# Patient Record
Sex: FEMALE | Race: WHITE | URBAN - METROPOLITAN AREA
[De-identification: names, ages, dates, MRNs, and addresses within clinical notes are randomized per-mention and may not be internally consistent; named-entity substitution may affect disease eponyms.]

---

## 2017-01-01 ENCOUNTER — APPOINTMENT (OUTPATIENT)
Dept: OCCUPATIONAL THERAPY | Facility: CLINIC | Age: 82
DRG: 064 | End: 2017-01-01
Attending: INTERNAL MEDICINE
Payer: MEDICARE

## 2017-01-01 ENCOUNTER — MEDICAL CORRESPONDENCE (OUTPATIENT)
Dept: HEALTH INFORMATION MANAGEMENT | Facility: CLINIC | Age: 82
End: 2017-01-01

## 2017-01-01 ENCOUNTER — TELEPHONE (OUTPATIENT)
Dept: FAMILY MEDICINE | Facility: CLINIC | Age: 82
End: 2017-01-01

## 2017-01-01 ENCOUNTER — APPOINTMENT (OUTPATIENT)
Dept: GENERAL RADIOLOGY | Facility: CLINIC | Age: 82
DRG: 064 | End: 2017-01-01
Attending: EMERGENCY MEDICINE
Payer: MEDICARE

## 2017-01-01 ENCOUNTER — APPOINTMENT (OUTPATIENT)
Dept: CT IMAGING | Facility: CLINIC | Age: 82
DRG: 065 | End: 2017-01-01
Attending: FAMILY MEDICINE
Payer: MEDICARE

## 2017-01-01 ENCOUNTER — DOCUMENTATION ONLY (OUTPATIENT)
Dept: OTHER | Facility: CLINIC | Age: 82
End: 2017-01-01

## 2017-01-01 ENCOUNTER — APPOINTMENT (OUTPATIENT)
Dept: MRI IMAGING | Facility: CLINIC | Age: 82
DRG: 064 | End: 2017-01-01
Attending: NURSE PRACTITIONER
Payer: MEDICARE

## 2017-01-01 ENCOUNTER — APPOINTMENT (OUTPATIENT)
Dept: PHYSICAL THERAPY | Facility: CLINIC | Age: 82
DRG: 064 | End: 2017-01-01
Attending: INTERNAL MEDICINE
Payer: MEDICARE

## 2017-01-01 ENCOUNTER — NURSING HOME VISIT (OUTPATIENT)
Dept: GERIATRICS | Facility: CLINIC | Age: 82
End: 2017-01-01
Payer: COMMERCIAL

## 2017-01-01 ENCOUNTER — APPOINTMENT (OUTPATIENT)
Dept: ULTRASOUND IMAGING | Facility: CLINIC | Age: 82
DRG: 064 | End: 2017-01-01
Attending: PSYCHIATRY & NEUROLOGY
Payer: MEDICARE

## 2017-01-01 ENCOUNTER — OFFICE VISIT (OUTPATIENT)
Dept: FAMILY MEDICINE | Facility: CLINIC | Age: 82
End: 2017-01-01
Payer: COMMERCIAL

## 2017-01-01 ENCOUNTER — RECORDS - HEALTHEAST (OUTPATIENT)
Dept: LAB | Facility: CLINIC | Age: 82
End: 2017-01-01

## 2017-01-01 ENCOUNTER — APPOINTMENT (OUTPATIENT)
Dept: SPEECH THERAPY | Facility: CLINIC | Age: 82
DRG: 064 | End: 2017-01-01
Attending: INTERNAL MEDICINE
Payer: MEDICARE

## 2017-01-01 ENCOUNTER — HOSPITAL ENCOUNTER (INPATIENT)
Facility: CLINIC | Age: 82
LOS: 4 days | Discharge: NURSING, REHAB OR LONG-TERM CARE | DRG: 064 | End: 2017-01-28
Attending: EMERGENCY MEDICINE | Admitting: INTERNAL MEDICINE
Payer: MEDICARE

## 2017-01-01 ENCOUNTER — APPOINTMENT (OUTPATIENT)
Dept: CARDIOLOGY | Facility: CLINIC | Age: 82
DRG: 064 | End: 2017-01-01
Attending: INTERNAL MEDICINE
Payer: MEDICARE

## 2017-01-01 ENCOUNTER — TRANSFERRED RECORDS (OUTPATIENT)
Dept: HEALTH INFORMATION MANAGEMENT | Facility: CLINIC | Age: 82
End: 2017-01-01

## 2017-01-01 ENCOUNTER — HOSPITAL ENCOUNTER (INPATIENT)
Facility: CLINIC | Age: 82
LOS: 5 days | Discharge: NURSING, REHAB OR LONG-TERM CARE | DRG: 065 | End: 2017-07-18
Attending: EMERGENCY MEDICINE | Admitting: INTERNAL MEDICINE
Payer: MEDICARE

## 2017-01-01 ENCOUNTER — DISCHARGE SUMMARY NURSING HOME (OUTPATIENT)
Dept: GERIATRICS | Facility: CLINIC | Age: 82
End: 2017-01-01
Payer: COMMERCIAL

## 2017-01-01 ENCOUNTER — TELEPHONE (OUTPATIENT)
Dept: GERIATRICS | Facility: CLINIC | Age: 82
End: 2017-01-01

## 2017-01-01 ENCOUNTER — APPOINTMENT (OUTPATIENT)
Dept: MRI IMAGING | Facility: CLINIC | Age: 82
DRG: 064 | End: 2017-01-01
Attending: PSYCHIATRY & NEUROLOGY
Payer: MEDICARE

## 2017-01-01 ENCOUNTER — APPOINTMENT (OUTPATIENT)
Dept: CT IMAGING | Facility: CLINIC | Age: 82
DRG: 064 | End: 2017-01-01
Attending: EMERGENCY MEDICINE
Payer: MEDICARE

## 2017-01-01 VITALS
DIASTOLIC BLOOD PRESSURE: 71 MMHG | HEART RATE: 67 BPM | SYSTOLIC BLOOD PRESSURE: 143 MMHG | RESPIRATION RATE: 20 BRPM | OXYGEN SATURATION: 97 % | TEMPERATURE: 97.9 F

## 2017-01-01 VITALS
RESPIRATION RATE: 20 BRPM | WEIGHT: 125 LBS | OXYGEN SATURATION: 94 % | DIASTOLIC BLOOD PRESSURE: 67 MMHG | TEMPERATURE: 98.4 F | BODY MASS INDEX: 22.86 KG/M2 | SYSTOLIC BLOOD PRESSURE: 154 MMHG | HEART RATE: 62 BPM

## 2017-01-01 VITALS
OXYGEN SATURATION: 96 % | SYSTOLIC BLOOD PRESSURE: 155 MMHG | HEART RATE: 80 BPM | RESPIRATION RATE: 19 BRPM | DIASTOLIC BLOOD PRESSURE: 61 MMHG | TEMPERATURE: 97.1 F

## 2017-01-01 VITALS
OXYGEN SATURATION: 56 % | BODY MASS INDEX: 23.33 KG/M2 | TEMPERATURE: 98.1 F | DIASTOLIC BLOOD PRESSURE: 67 MMHG | HEART RATE: 56 BPM | WEIGHT: 127.6 LBS | SYSTOLIC BLOOD PRESSURE: 126 MMHG | RESPIRATION RATE: 19 BRPM

## 2017-01-01 VITALS
WEIGHT: 129.8 LBS | SYSTOLIC BLOOD PRESSURE: 121 MMHG | DIASTOLIC BLOOD PRESSURE: 54 MMHG | TEMPERATURE: 97.8 F | OXYGEN SATURATION: 97 % | HEART RATE: 69 BPM | RESPIRATION RATE: 16 BRPM | BODY MASS INDEX: 23.74 KG/M2

## 2017-01-01 VITALS
DIASTOLIC BLOOD PRESSURE: 64 MMHG | WEIGHT: 128 LBS | RESPIRATION RATE: 20 BRPM | SYSTOLIC BLOOD PRESSURE: 126 MMHG | BODY MASS INDEX: 23.55 KG/M2 | HEART RATE: 88 BPM | HEIGHT: 62 IN | TEMPERATURE: 99.2 F | OXYGEN SATURATION: 97 %

## 2017-01-01 VITALS
RESPIRATION RATE: 16 BRPM | DIASTOLIC BLOOD PRESSURE: 77 MMHG | TEMPERATURE: 99 F | SYSTOLIC BLOOD PRESSURE: 136 MMHG | OXYGEN SATURATION: 95 % | HEART RATE: 90 BPM

## 2017-01-01 DIAGNOSIS — I10 ESSENTIAL HYPERTENSION WITH GOAL BLOOD PRESSURE LESS THAN 140/90: ICD-10-CM

## 2017-01-01 DIAGNOSIS — R41.89 COGNITIVE IMPAIRMENT: ICD-10-CM

## 2017-01-01 DIAGNOSIS — E03.9 ACQUIRED HYPOTHYROIDISM: ICD-10-CM

## 2017-01-01 DIAGNOSIS — Z71.89 ACP (ADVANCE CARE PLANNING): Chronic | ICD-10-CM

## 2017-01-01 DIAGNOSIS — I42.9 CARDIOMYOPATHY, UNSPECIFIED (H): ICD-10-CM

## 2017-01-01 DIAGNOSIS — I21.4 NSTEMI (NON-ST ELEVATED MYOCARDIAL INFARCTION) (H): ICD-10-CM

## 2017-01-01 DIAGNOSIS — I42.9 CARDIOMYOPATHY, UNSPECIFIED (H): Primary | ICD-10-CM

## 2017-01-01 DIAGNOSIS — I63.9 CEREBROVASCULAR ACCIDENT (CVA), UNSPECIFIED MECHANISM (H): Primary | ICD-10-CM

## 2017-01-01 DIAGNOSIS — M15.0 PRIMARY OSTEOARTHRITIS INVOLVING MULTIPLE JOINTS: ICD-10-CM

## 2017-01-01 DIAGNOSIS — I63.9 CEREBROVASCULAR ACCIDENT (CVA), UNSPECIFIED MECHANISM (H): ICD-10-CM

## 2017-01-01 DIAGNOSIS — R44.3 HALLUCINATIONS: ICD-10-CM

## 2017-01-01 DIAGNOSIS — E03.9 ACQUIRED HYPOTHYROIDISM: Primary | ICD-10-CM

## 2017-01-01 DIAGNOSIS — R53.81 PHYSICAL DECONDITIONING: ICD-10-CM

## 2017-01-01 DIAGNOSIS — R05.9 COUGH: ICD-10-CM

## 2017-01-01 DIAGNOSIS — Z53.9 DIAGNOSIS NOT YET DEFINED: Primary | ICD-10-CM

## 2017-01-01 DIAGNOSIS — Z23 NEED FOR PROPHYLACTIC VACCINATION AGAINST STREPTOCOCCUS PNEUMONIAE (PNEUMOCOCCUS): ICD-10-CM

## 2017-01-01 DIAGNOSIS — R41.0 DELIRIUM: Primary | ICD-10-CM

## 2017-01-01 DIAGNOSIS — I63.9 ACUTE EMBOLIC STROKE (H): ICD-10-CM

## 2017-01-01 DIAGNOSIS — R41.3 MEMORY LOSS: ICD-10-CM

## 2017-01-01 DIAGNOSIS — R41.0 DELIRIUM: ICD-10-CM

## 2017-01-01 DIAGNOSIS — N39.0 URINARY TRACT INFECTION WITHOUT HEMATURIA, SITE UNSPECIFIED: ICD-10-CM

## 2017-01-01 DIAGNOSIS — Z51.5 HOSPICE CARE PATIENT: Primary | ICD-10-CM

## 2017-01-01 DIAGNOSIS — K59.01 SLOW TRANSIT CONSTIPATION: ICD-10-CM

## 2017-01-01 LAB
ALBUMIN SERPL-MCNC: 3.5 G/DL (ref 3.4–5)
ALBUMIN UR-MCNC: NEGATIVE MG/DL
ALP SERPL-CCNC: 93 U/L (ref 40–150)
ALT SERPL W P-5'-P-CCNC: 13 U/L (ref 0–45)
ALT SERPL W P-5'-P-CCNC: 15 U/L (ref 0–50)
ANION GAP SERPL CALCULATED.3IONS-SCNC: 10 MMOL/L (ref 5–18)
ANION GAP SERPL CALCULATED.3IONS-SCNC: 7 MMOL/L (ref 3–14)
ANION GAP SERPL CALCULATED.3IONS-SCNC: 7 MMOL/L (ref 3–14)
ANION GAP SERPL CALCULATED.3IONS-SCNC: 7 MMOL/L (ref 5–18)
APPEARANCE UR: ABNORMAL
APPEARANCE UR: CLEAR
APPEARANCE UR: CLEAR
APTT PPP: 27 SEC (ref 22–37)
AST SERPL W P-5'-P-CCNC: 18 U/L (ref 0–45)
BACTERIA #/AREA URNS HPF: ABNORMAL /HPF
BASOPHILS # BLD AUTO: 0 10E9/L (ref 0–0.2)
BASOPHILS # BLD AUTO: 0.1 10E9/L (ref 0–0.2)
BASOPHILS NFR BLD AUTO: 0.5 %
BASOPHILS NFR BLD AUTO: 0.7 %
BILIRUB SERPL-MCNC: 0.4 MG/DL (ref 0.2–1.3)
BILIRUB UR QL STRIP: NEGATIVE
BUN SERPL-MCNC: 18 MG/DL (ref 8–28)
BUN SERPL-MCNC: 20 MG/DL (ref 7–30)
BUN SERPL-MCNC: 22 MG/DL (ref 7–30)
BUN SERPL-MCNC: 23 MG/DL (ref 8–28)
CALCIUM SERPL-MCNC: 8.6 MG/DL (ref 8.5–10.1)
CALCIUM SERPL-MCNC: 8.9 MG/DL (ref 8.5–10.1)
CALCIUM SERPL-MCNC: 8.9 MG/DL (ref 8.5–10.5)
CALCIUM SERPL-MCNC: 9.3 MG/DL (ref 8.5–10.5)
CASTS #/AREA URNS LPF: ABNORMAL /LPF (ref 0–2)
CHLORIDE SERPL-SCNC: 107 MMOL/L (ref 94–109)
CHLORIDE SERPL-SCNC: 110 MMOL/L (ref 94–109)
CHLORIDE SERPLBLD-SCNC: 104 MMOL/L (ref 98–107)
CHLORIDE SERPLBLD-SCNC: 110 MMOL/L (ref 98–107)
CHOLEST SERPL-MCNC: 119 MG/DL
CHOLEST SERPL-MCNC: 174 MG/DL
CO2 SERPL-SCNC: 23 MMOL/L (ref 20–32)
CO2 SERPL-SCNC: 23 MMOL/L (ref 22–31)
CO2 SERPL-SCNC: 24 MMOL/L (ref 22–31)
CO2 SERPL-SCNC: 26 MMOL/L (ref 20–32)
COLOR UR AUTO: ABNORMAL
COLOR UR AUTO: YELLOW
COLOR UR AUTO: YELLOW
CREAT SERPL-MCNC: 0.73 MG/DL (ref 0.52–1.04)
CREAT SERPL-MCNC: 0.73 MG/DL (ref 0.6–1.1)
CREAT SERPL-MCNC: 0.74 MG/DL (ref 0.6–1.1)
CREAT SERPL-MCNC: 0.79 MG/DL (ref 0.52–1.04)
DIFFERENTIAL METHOD BLD: ABNORMAL
DIFFERENTIAL METHOD BLD: ABNORMAL
EOSINOPHIL # BLD AUTO: 0.1 10E9/L (ref 0–0.7)
EOSINOPHIL # BLD AUTO: 0.4 10E9/L (ref 0–0.7)
EOSINOPHIL NFR BLD AUTO: 1.1 %
EOSINOPHIL NFR BLD AUTO: 6.1 %
ERYTHROCYTE [DISTWIDTH] IN BLOOD BY AUTOMATED COUNT: 12.9 % (ref 10–15)
ERYTHROCYTE [DISTWIDTH] IN BLOOD BY AUTOMATED COUNT: 13.9 % (ref 10–15)
FASTING STATUS PATIENT QL REPORTED: YES
GFR SERPL CREATININE-BSD FRML MDRD: 68 ML/MIN/1.7M2
GFR SERPL CREATININE-BSD FRML MDRD: 75 ML/MIN/1.7M2
GFR SERPL CREATININE-BSD FRML MDRD: >60 ML/MIN/1.73M2
GFR SERPL CREATININE-BSD FRML MDRD: >60 ML/MIN/1.73M2
GLUCOSE SERPL-MCNC: 105 MG/DL (ref 70–125)
GLUCOSE SERPL-MCNC: 119 MG/DL (ref 70–99)
GLUCOSE SERPL-MCNC: 80 MG/DL (ref 70–125)
GLUCOSE SERPL-MCNC: 95 MG/DL (ref 70–99)
GLUCOSE UR STRIP-MCNC: NEGATIVE MG/DL
HCT VFR BLD AUTO: 32.1 % (ref 35–47)
HCT VFR BLD AUTO: 34.9 % (ref 35–47)
HDLC SERPL-MCNC: 30 MG/DL
HDLC SERPL-MCNC: 44 MG/DL
HGB BLD-MCNC: 11.2 G/DL (ref 11.7–15.7)
HGB BLD-MCNC: 12 G/DL (ref 11.7–15.7)
HGB UR QL STRIP: ABNORMAL
HGB UR QL STRIP: NEGATIVE
HGB UR QL STRIP: NEGATIVE
IMM GRANULOCYTES # BLD: 0 10E9/L (ref 0–0.4)
IMM GRANULOCYTES # BLD: 0 10E9/L (ref 0–0.4)
IMM GRANULOCYTES NFR BLD: 0 %
IMM GRANULOCYTES NFR BLD: 0.1 %
INR PPP: 1 (ref 0.86–1.14)
INTERPRETATION ECG - MUSE: NORMAL
KETONES UR STRIP-MCNC: NEGATIVE MG/DL
LDLC SERPL CALC-MCNC: 115 MG/DL
LDLC SERPL CALC-MCNC: 73 MG/DL
LEUKOCYTE ESTERASE UR QL STRIP: ABNORMAL
LYMPHOCYTES # BLD AUTO: 1.6 10E9/L (ref 0.8–5.3)
LYMPHOCYTES # BLD AUTO: 2.4 10E9/L (ref 0.8–5.3)
LYMPHOCYTES NFR BLD AUTO: 19.6 %
LYMPHOCYTES NFR BLD AUTO: 35.5 %
MCH RBC QN AUTO: 29.9 PG (ref 26.5–33)
MCH RBC QN AUTO: 31.9 PG (ref 26.5–33)
MCHC RBC AUTO-ENTMCNC: 34.4 G/DL (ref 31.5–36.5)
MCHC RBC AUTO-ENTMCNC: 34.9 G/DL (ref 31.5–36.5)
MCV RBC AUTO: 87 FL (ref 78–100)
MCV RBC AUTO: 92 FL (ref 78–100)
MONOCYTES # BLD AUTO: 0.6 10E9/L (ref 0–1.3)
MONOCYTES # BLD AUTO: 0.6 10E9/L (ref 0–1.3)
MONOCYTES NFR BLD AUTO: 7.7 %
MONOCYTES NFR BLD AUTO: 8.8 %
MUCOUS THREADS #/AREA URNS LPF: PRESENT /LPF
NEUTROPHILS # BLD AUTO: 3.3 10E9/L (ref 1.6–8.3)
NEUTROPHILS # BLD AUTO: 5.6 10E9/L (ref 1.6–8.3)
NEUTROPHILS NFR BLD AUTO: 48.8 %
NEUTROPHILS NFR BLD AUTO: 71.1 %
NITRATE UR QL: NEGATIVE
NON-SQ EPI CELLS #/AREA URNS LPF: ABNORMAL /LPF
NON-SQ EPI CELLS #/AREA URNS LPF: ABNORMAL /LPF
NONHDLC SERPL-MCNC: 130 MG/DL
NRBC # BLD AUTO: 0 10*3/UL
NRBC # BLD AUTO: 0 10*3/UL
NRBC BLD AUTO-RTO: 0 /100
NRBC BLD AUTO-RTO: 0 /100
PH UR STRIP: 5.5 PH (ref 5–7)
PH UR STRIP: 6.5 PH (ref 5–7)
PH UR STRIP: 7 PH (ref 5–7)
PLATELET # BLD AUTO: 213 10E9/L (ref 150–450)
PLATELET # BLD AUTO: 310 10E9/L (ref 150–450)
POTASSIUM SERPL-SCNC: 4.1 MMOL/L (ref 3.4–5.3)
POTASSIUM SERPL-SCNC: 4.2 MMOL/L (ref 3.4–5.3)
POTASSIUM SERPL-SCNC: 4.3 MMOL/L (ref 3.5–5)
POTASSIUM SERPL-SCNC: 5.1 MMOL/L (ref 3.5–5)
PROT SERPL-MCNC: 6.6 G/DL (ref 6.8–8.8)
RBC # BLD AUTO: 3.51 10E12/L (ref 3.8–5.2)
RBC # BLD AUTO: 4.01 10E12/L (ref 3.8–5.2)
RBC #/AREA URNS AUTO: 3 /HPF (ref 0–2)
RBC #/AREA URNS AUTO: ABNORMAL /HPF (ref 0–2)
RBC #/AREA URNS AUTO: ABNORMAL /HPF (ref 0–2)
SODIUM SERPL-SCNC: 137 MMOL/L (ref 136–145)
SODIUM SERPL-SCNC: 140 MMOL/L (ref 133–144)
SODIUM SERPL-SCNC: 140 MMOL/L (ref 133–144)
SODIUM SERPL-SCNC: 141 MMOL/L (ref 136–145)
SP GR UR STRIP: 1.01 (ref 1–1.03)
SQUAMOUS #/AREA URNS AUTO: 4 /HPF (ref 0–1)
T4 FREE SERPL-MCNC: 1.52 NG/DL (ref 0.76–1.46)
T4 FREE SERPL-MCNC: 1.72 NG/DL (ref 0.76–1.46)
TRANS CELLS #/AREA URNS HPF: 1 /HPF (ref 0–1)
TRIGL SERPL-MCNC: 75 MG/DL
TRIGL SERPL-MCNC: 81 MG/DL
TROPONIN I SERPL-MCNC: 0.19 UG/L (ref 0–0.04)
TROPONIN I SERPL-MCNC: 0.27 UG/L (ref 0–0.04)
TROPONIN I SERPL-MCNC: 0.31 UG/L (ref 0–0.04)
TROPONIN I SERPL-MCNC: 0.34 UG/L (ref 0–0.04)
TSH SERPL DL<=0.005 MIU/L-ACNC: <0.01 MU/L (ref 0.4–4)
TSH SERPL DL<=0.05 MIU/L-ACNC: <0.01 MU/L (ref 0.4–4)
URN SPEC COLLECT METH UR: ABNORMAL
UROBILINOGEN UR STRIP-ACNC: 0.2 EU/DL (ref 0.2–1)
UROBILINOGEN UR STRIP-ACNC: 0.2 EU/DL (ref 0.2–1)
UROBILINOGEN UR STRIP-MCNC: NORMAL MG/DL (ref 0–2)
WBC # BLD AUTO: 6.9 10E9/L (ref 4–11)
WBC # BLD AUTO: 7.9 10E9/L (ref 4–11)
WBC #/AREA URNS AUTO: >182 /HPF (ref 0–2)
WBC #/AREA URNS AUTO: ABNORMAL /HPF (ref 0–2)
WBC #/AREA URNS AUTO: ABNORMAL /HPF (ref 0–2)

## 2017-01-01 PROCEDURE — 93880 EXTRACRANIAL BILAT STUDY: CPT

## 2017-01-01 PROCEDURE — 40000915 ZZH STATISTIC SITTER, EVENING HOURS

## 2017-01-01 PROCEDURE — 25000128 H RX IP 250 OP 636: Performed by: INTERNAL MEDICINE

## 2017-01-01 PROCEDURE — 85025 COMPLETE CBC W/AUTO DIFF WBC: CPT

## 2017-01-01 PROCEDURE — 84443 ASSAY THYROID STIM HORMONE: CPT | Performed by: INTERNAL MEDICINE

## 2017-01-01 PROCEDURE — 99222 1ST HOSP IP/OBS MODERATE 55: CPT | Performed by: NURSE PRACTITIONER

## 2017-01-01 PROCEDURE — A9270 NON-COVERED ITEM OR SERVICE: HCPCS | Mod: GY | Performed by: INTERNAL MEDICINE

## 2017-01-01 PROCEDURE — 99207 ZZC CDG-CORRECTLY CODED, REVIEWED AND AGREE: CPT | Performed by: NURSE PRACTITIONER

## 2017-01-01 PROCEDURE — 36415 COLL VENOUS BLD VENIPUNCTURE: CPT | Performed by: INTERNAL MEDICINE

## 2017-01-01 PROCEDURE — 25000125 ZZHC RX 250: Performed by: INTERNAL MEDICINE

## 2017-01-01 PROCEDURE — 12000000 ZZH R&B MED SURG/OB

## 2017-01-01 PROCEDURE — 81001 URINALYSIS AUTO W/SCOPE: CPT | Performed by: EMERGENCY MEDICINE

## 2017-01-01 PROCEDURE — 40000916 ZZH STATISTIC SITTER, NIGHT HOURS

## 2017-01-01 PROCEDURE — 25500064 ZZH RX 255 OP 636: Performed by: INTERNAL MEDICINE

## 2017-01-01 PROCEDURE — 93005 ELECTROCARDIOGRAM TRACING: CPT

## 2017-01-01 PROCEDURE — S0166 INJ OLANZAPINE 2.5MG: HCPCS | Performed by: INTERNAL MEDICINE

## 2017-01-01 PROCEDURE — 81001 URINALYSIS AUTO W/SCOPE: CPT | Performed by: INTERNAL MEDICINE

## 2017-01-01 PROCEDURE — 70450 CT HEAD/BRAIN W/O DYE: CPT

## 2017-01-01 PROCEDURE — 25000132 ZZH RX MED GY IP 250 OP 250 PS 637: Mod: GY | Performed by: INTERNAL MEDICINE

## 2017-01-01 PROCEDURE — 99239 HOSP IP/OBS DSCHRG MGMT >30: CPT | Performed by: INTERNAL MEDICINE

## 2017-01-01 PROCEDURE — 97535 SELF CARE MNGMENT TRAINING: CPT | Mod: GO

## 2017-01-01 PROCEDURE — 99232 SBSQ HOSP IP/OBS MODERATE 35: CPT | Performed by: INTERNAL MEDICINE

## 2017-01-01 PROCEDURE — 97530 THERAPEUTIC ACTIVITIES: CPT | Mod: GO

## 2017-01-01 PROCEDURE — 80061 LIPID PANEL: CPT | Performed by: INTERNAL MEDICINE

## 2017-01-01 PROCEDURE — 96361 HYDRATE IV INFUSION ADD-ON: CPT

## 2017-01-01 PROCEDURE — 99316 NF DSCHRG MGMT 30 MIN+: CPT | Performed by: NURSE PRACTITIONER

## 2017-01-01 PROCEDURE — 99223 1ST HOSP IP/OBS HIGH 75: CPT | Mod: AI | Performed by: INTERNAL MEDICINE

## 2017-01-01 PROCEDURE — 84443 ASSAY THYROID STIM HORMONE: CPT | Performed by: EMERGENCY MEDICINE

## 2017-01-01 PROCEDURE — 99285 EMERGENCY DEPT VISIT HI MDM: CPT | Mod: 25

## 2017-01-01 PROCEDURE — 97161 PT EVAL LOW COMPLEX 20 MIN: CPT | Mod: GP

## 2017-01-01 PROCEDURE — 99231 SBSQ HOSP IP/OBS SF/LOW 25: CPT | Performed by: INTERNAL MEDICINE

## 2017-01-01 PROCEDURE — A9585 GADOBUTROL INJECTION: HCPCS | Performed by: INTERNAL MEDICINE

## 2017-01-01 PROCEDURE — 25000125 ZZHC RX 250: Performed by: FAMILY MEDICINE

## 2017-01-01 PROCEDURE — 85610 PROTHROMBIN TIME: CPT

## 2017-01-01 PROCEDURE — 70553 MRI BRAIN STEM W/O & W/DYE: CPT

## 2017-01-01 PROCEDURE — 25000132 ZZH RX MED GY IP 250 OP 250 PS 637: Mod: GY | Performed by: EMERGENCY MEDICINE

## 2017-01-01 PROCEDURE — 99233 SBSQ HOSP IP/OBS HIGH 50: CPT | Performed by: INTERNAL MEDICINE

## 2017-01-01 PROCEDURE — 85025 COMPLETE CBC W/AUTO DIFF WBC: CPT | Performed by: EMERGENCY MEDICINE

## 2017-01-01 PROCEDURE — 84439 ASSAY OF FREE THYROXINE: CPT | Performed by: EMERGENCY MEDICINE

## 2017-01-01 PROCEDURE — 92526 ORAL FUNCTION THERAPY: CPT | Mod: GN

## 2017-01-01 PROCEDURE — 25000128 H RX IP 250 OP 636: Performed by: EMERGENCY MEDICINE

## 2017-01-01 PROCEDURE — 99309 SBSQ NF CARE MODERATE MDM 30: CPT | Performed by: NURSE PRACTITIONER

## 2017-01-01 PROCEDURE — 99306 1ST NF CARE HIGH MDM 50: CPT | Performed by: FAMILY MEDICINE

## 2017-01-01 PROCEDURE — 80053 COMPREHEN METABOLIC PANEL: CPT | Performed by: EMERGENCY MEDICINE

## 2017-01-01 PROCEDURE — 80048 BASIC METABOLIC PNL TOTAL CA: CPT

## 2017-01-01 PROCEDURE — 70498 CT ANGIOGRAPHY NECK: CPT

## 2017-01-01 PROCEDURE — 99223 1ST HOSP IP/OBS HIGH 75: CPT | Performed by: FAMILY MEDICINE

## 2017-01-01 PROCEDURE — 99207 ZZC CDG-CORRECTLY CODED, REVIEWED AND AGREE: CPT | Performed by: FAMILY MEDICINE

## 2017-01-01 PROCEDURE — 99214 OFFICE O/P EST MOD 30 MIN: CPT | Mod: 25 | Performed by: INTERNAL MEDICINE

## 2017-01-01 PROCEDURE — 25000128 H RX IP 250 OP 636: Performed by: PSYCHIATRY & NEUROLOGY

## 2017-01-01 PROCEDURE — A9270 NON-COVERED ITEM OR SERVICE: HCPCS | Mod: GY | Performed by: EMERGENCY MEDICINE

## 2017-01-01 PROCEDURE — 96360 HYDRATION IV INFUSION INIT: CPT

## 2017-01-01 PROCEDURE — 40000264 ECHO COMPLETE WITH OPTISON

## 2017-01-01 PROCEDURE — 40000225 ZZH STATISTIC SLP WARD VISIT

## 2017-01-01 PROCEDURE — 84484 ASSAY OF TROPONIN QUANT: CPT | Performed by: EMERGENCY MEDICINE

## 2017-01-01 PROCEDURE — 97165 OT EVAL LOW COMPLEX 30 MIN: CPT | Mod: GO

## 2017-01-01 PROCEDURE — 70551 MRI BRAIN STEM W/O DYE: CPT

## 2017-01-01 PROCEDURE — 99310 SBSQ NF CARE HIGH MDM 45: CPT | Performed by: NURSE PRACTITIONER

## 2017-01-01 PROCEDURE — 93306 TTE W/DOPPLER COMPLETE: CPT | Mod: 26 | Performed by: INTERNAL MEDICINE

## 2017-01-01 PROCEDURE — 99207 ZZC CDG-MDM COMPONENT: MEETS MODERATE - UP CODED: CPT | Performed by: INTERNAL MEDICINE

## 2017-01-01 PROCEDURE — 92610 EVALUATE SWALLOWING FUNCTION: CPT | Mod: GN

## 2017-01-01 PROCEDURE — 84484 ASSAY OF TROPONIN QUANT: CPT | Performed by: INTERNAL MEDICINE

## 2017-01-01 PROCEDURE — G0378 HOSPITAL OBSERVATION PER HR: HCPCS

## 2017-01-01 PROCEDURE — 96374 THER/PROPH/DIAG INJ IV PUSH: CPT

## 2017-01-01 PROCEDURE — 70460 CT HEAD/BRAIN W/DYE: CPT

## 2017-01-01 PROCEDURE — 84439 ASSAY OF FREE THYROXINE: CPT | Performed by: INTERNAL MEDICINE

## 2017-01-01 PROCEDURE — 71020 XR CHEST 2 VW: CPT

## 2017-01-01 PROCEDURE — 25000128 H RX IP 250 OP 636: Performed by: FAMILY MEDICINE

## 2017-01-01 PROCEDURE — G0180 MD CERTIFICATION HHA PATIENT: HCPCS | Performed by: FAMILY MEDICINE

## 2017-01-01 PROCEDURE — 90670 PCV13 VACCINE IM: CPT | Performed by: INTERNAL MEDICINE

## 2017-01-01 PROCEDURE — 85730 THROMBOPLASTIN TIME PARTIAL: CPT

## 2017-01-01 PROCEDURE — 99220 ZZC INITIAL OBSERVATION CARE,LEVL III: CPT | Performed by: INTERNAL MEDICINE

## 2017-01-01 PROCEDURE — 40000133 ZZH STATISTIC OT WARD VISIT

## 2017-01-01 PROCEDURE — G0009 ADMIN PNEUMOCOCCAL VACCINE: HCPCS | Performed by: INTERNAL MEDICINE

## 2017-01-01 PROCEDURE — 99207 ZZC CDG-MDM COMPONENT: MEETS LOW - DOWN CODED: CPT | Performed by: INTERNAL MEDICINE

## 2017-01-01 PROCEDURE — 40000193 ZZH STATISTIC PT WARD VISIT

## 2017-01-01 PROCEDURE — 70470 CT HEAD/BRAIN W/O & W/DYE: CPT | Mod: XS

## 2017-01-01 PROCEDURE — 97530 THERAPEUTIC ACTIVITIES: CPT | Mod: GP

## 2017-01-01 PROCEDURE — 97116 GAIT TRAINING THERAPY: CPT | Mod: GP

## 2017-01-01 RX ORDER — BISACODYL 10 MG
10 SUPPOSITORY, RECTAL RECTAL DAILY PRN
Status: DISCONTINUED | OUTPATIENT
Start: 2017-01-01 | End: 2017-01-01 | Stop reason: HOSPADM

## 2017-01-01 RX ORDER — ATROPINE SULFATE 10 MG/ML
2 SOLUTION/ DROPS OPHTHALMIC
Qty: 5 ML | Refills: 1 | Status: SHIPPED | OUTPATIENT
Start: 2017-01-01

## 2017-01-01 RX ORDER — POLYETHYLENE GLYCOL 3350 17 G/17G
17 POWDER, FOR SOLUTION ORAL DAILY PRN
Status: DISCONTINUED | OUTPATIENT
Start: 2017-01-01 | End: 2017-01-01

## 2017-01-01 RX ORDER — SODIUM CHLORIDE 9 MG/ML
1000 INJECTION, SOLUTION INTRAVENOUS CONTINUOUS
Status: DISCONTINUED | OUTPATIENT
Start: 2017-01-01 | End: 2017-01-01

## 2017-01-01 RX ORDER — LISINOPRIL 5 MG/1
5 TABLET ORAL DAILY
Qty: 30 TABLET | Status: ON HOLD | DISCHARGE
Start: 2017-01-01 | End: 2017-01-01

## 2017-01-01 RX ORDER — SODIUM CHLORIDE 9 MG/ML
INJECTION, SOLUTION INTRAVENOUS CONTINUOUS
Status: DISCONTINUED | OUTPATIENT
Start: 2017-01-01 | End: 2017-01-01 | Stop reason: HOSPADM

## 2017-01-01 RX ORDER — ONDANSETRON 2 MG/ML
4 INJECTION INTRAMUSCULAR; INTRAVENOUS EVERY 6 HOURS PRN
Status: DISCONTINUED | OUTPATIENT
Start: 2017-01-01 | End: 2017-01-01 | Stop reason: HOSPADM

## 2017-01-01 RX ORDER — ASPIRIN 300 MG/1
300 SUPPOSITORY RECTAL DAILY
Status: DISCONTINUED | OUTPATIENT
Start: 2017-01-01 | End: 2017-01-01

## 2017-01-01 RX ORDER — AMOXICILLIN 250 MG
1 CAPSULE ORAL DAILY PRN
Status: ON HOLD | COMMUNITY
End: 2017-01-01

## 2017-01-01 RX ORDER — MORPHINE SULFATE 2 MG/ML
1-2 INJECTION, SOLUTION INTRAMUSCULAR; INTRAVENOUS
Status: DISCONTINUED | OUTPATIENT
Start: 2017-01-01 | End: 2017-01-01 | Stop reason: HOSPADM

## 2017-01-01 RX ORDER — CARVEDILOL 3.12 MG/1
3.12 TABLET ORAL 2 TIMES DAILY WITH MEALS
Status: DISCONTINUED | OUTPATIENT
Start: 2017-01-01 | End: 2017-01-01 | Stop reason: HOSPADM

## 2017-01-01 RX ORDER — CIPROFLOXACIN 2 MG/ML
400 INJECTION, SOLUTION INTRAVENOUS EVERY 12 HOURS
Status: DISCONTINUED | OUTPATIENT
Start: 2017-01-01 | End: 2017-01-01

## 2017-01-01 RX ORDER — OLANZAPINE 5 MG/1
5 TABLET, ORALLY DISINTEGRATING ORAL EVERY 6 HOURS PRN
Status: DISCONTINUED | OUTPATIENT
Start: 2017-01-01 | End: 2017-01-01

## 2017-01-01 RX ORDER — PROCHLORPERAZINE 25 MG
12.5 SUPPOSITORY, RECTAL RECTAL EVERY 12 HOURS PRN
Status: DISCONTINUED | OUTPATIENT
Start: 2017-01-01 | End: 2017-01-01 | Stop reason: HOSPADM

## 2017-01-01 RX ORDER — OLANZAPINE 5 MG/1
5 TABLET, ORALLY DISINTEGRATING ORAL 2 TIMES DAILY
Qty: 30 TABLET | Refills: 0 | Status: SHIPPED | OUTPATIENT
Start: 2017-01-01

## 2017-01-01 RX ORDER — ACETAMINOPHEN 325 MG/1
650 TABLET ORAL EVERY 4 HOURS PRN
Status: DISCONTINUED | OUTPATIENT
Start: 2017-01-01 | End: 2017-01-01 | Stop reason: HOSPADM

## 2017-01-01 RX ORDER — ACETAMINOPHEN 650 MG/1
650 SUPPOSITORY RECTAL EVERY 4 HOURS PRN
Status: DISCONTINUED | OUTPATIENT
Start: 2017-01-01 | End: 2017-01-01 | Stop reason: HOSPADM

## 2017-01-01 RX ORDER — LEVOTHYROXINE SODIUM 50 UG/1
50 TABLET ORAL DAILY
DISCHARGE
Start: 2017-01-01 | End: 2017-01-01

## 2017-01-01 RX ORDER — CARVEDILOL 3.12 MG/1
3.12 TABLET ORAL 2 TIMES DAILY WITH MEALS
Qty: 60 TABLET | Status: ON HOLD | DISCHARGE
Start: 2017-01-01 | End: 2017-01-01

## 2017-01-01 RX ORDER — OSELTAMIVIR PHOSPHATE 30 MG/1
30 CAPSULE ORAL DAILY
COMMUNITY
End: 2017-01-01

## 2017-01-01 RX ORDER — AMOXICILLIN 250 MG
1-2 CAPSULE ORAL 2 TIMES DAILY PRN
Status: DISCONTINUED | OUTPATIENT
Start: 2017-01-01 | End: 2017-01-01 | Stop reason: HOSPADM

## 2017-01-01 RX ORDER — LORAZEPAM 0.5 MG/1
.5-1 TABLET ORAL
Status: DISCONTINUED | OUTPATIENT
Start: 2017-01-01 | End: 2017-01-01 | Stop reason: HOSPADM

## 2017-01-01 RX ORDER — ONDANSETRON 2 MG/ML
4 INJECTION INTRAMUSCULAR; INTRAVENOUS EVERY 6 HOURS PRN
Status: DISCONTINUED | OUTPATIENT
Start: 2017-01-01 | End: 2017-01-01

## 2017-01-01 RX ORDER — AMOXICILLIN 250 MG
1 CAPSULE ORAL DAILY PRN
Status: DISCONTINUED | OUTPATIENT
Start: 2017-01-01 | End: 2017-01-01 | Stop reason: HOSPADM

## 2017-01-01 RX ORDER — CEFTRIAXONE 1 G/1
1 INJECTION, POWDER, FOR SOLUTION INTRAMUSCULAR; INTRAVENOUS EVERY 24 HOURS
Status: DISCONTINUED | OUTPATIENT
Start: 2017-01-01 | End: 2017-01-01

## 2017-01-01 RX ORDER — OLANZAPINE 5 MG/1
5 TABLET, ORALLY DISINTEGRATING ORAL 2 TIMES DAILY PRN
Status: DISCONTINUED | OUTPATIENT
Start: 2017-01-01 | End: 2017-01-01

## 2017-01-01 RX ORDER — BISACODYL 10 MG
SUPPOSITORY, RECTAL RECTAL
Qty: 12 SUPPOSITORY | Refills: 1 | Status: SHIPPED | OUTPATIENT
Start: 2017-01-01

## 2017-01-01 RX ORDER — ATORVASTATIN CALCIUM 10 MG/1
10 TABLET, FILM COATED ORAL DAILY
Status: DISCONTINUED | OUTPATIENT
Start: 2017-01-01 | End: 2017-01-01 | Stop reason: HOSPADM

## 2017-01-01 RX ORDER — ONDANSETRON 4 MG/1
4 TABLET, ORALLY DISINTEGRATING ORAL EVERY 6 HOURS PRN
Status: DISCONTINUED | OUTPATIENT
Start: 2017-01-01 | End: 2017-01-01 | Stop reason: HOSPADM

## 2017-01-01 RX ORDER — LANOLIN ALCOHOL/MO/W.PET/CERES
1000 CREAM (GRAM) TOPICAL DAILY
Status: DISCONTINUED | OUTPATIENT
Start: 2017-01-01 | End: 2017-01-01

## 2017-01-01 RX ORDER — WATER 10 ML/10ML
INJECTION INTRAMUSCULAR; INTRAVENOUS; SUBCUTANEOUS
Status: DISCONTINUED
Start: 2017-01-01 | End: 2017-01-01 | Stop reason: HOSPADM

## 2017-01-01 RX ORDER — IOPAMIDOL 755 MG/ML
120 INJECTION, SOLUTION INTRAVASCULAR ONCE
Status: COMPLETED | OUTPATIENT
Start: 2017-01-01 | End: 2017-01-01

## 2017-01-01 RX ORDER — ASPIRIN 325 MG
325 TABLET ORAL DAILY
Status: DISCONTINUED | OUTPATIENT
Start: 2017-01-01 | End: 2017-01-01

## 2017-01-01 RX ORDER — GADOBUTROL 604.72 MG/ML
6 INJECTION INTRAVENOUS ONCE
Status: COMPLETED | OUTPATIENT
Start: 2017-01-01 | End: 2017-01-01

## 2017-01-01 RX ORDER — LEVOTHYROXINE SODIUM 75 UG/1
75 TABLET ORAL EVERY MORNING
Status: DISCONTINUED | OUTPATIENT
Start: 2017-01-01 | End: 2017-01-01 | Stop reason: HOSPADM

## 2017-01-01 RX ORDER — ASPIRIN 325 MG
325 TABLET ORAL DAILY
Status: DISCONTINUED | OUTPATIENT
Start: 2017-01-01 | End: 2017-01-01 | Stop reason: HOSPADM

## 2017-01-01 RX ORDER — ASPIRIN 325 MG
325 TABLET ORAL ONCE
Status: COMPLETED | OUTPATIENT
Start: 2017-01-01 | End: 2017-01-01

## 2017-01-01 RX ORDER — ASPIRIN 81 MG/1
81 TABLET ORAL DAILY
Status: DISCONTINUED | OUTPATIENT
Start: 2017-01-01 | End: 2017-01-01

## 2017-01-01 RX ORDER — ATORVASTATIN CALCIUM 10 MG/1
10 TABLET, FILM COATED ORAL DAILY
Qty: 30 TABLET | Status: ON HOLD | DISCHARGE
Start: 2017-01-01 | End: 2017-01-01

## 2017-01-01 RX ORDER — NALOXONE HYDROCHLORIDE 0.4 MG/ML
.1-.4 INJECTION, SOLUTION INTRAMUSCULAR; INTRAVENOUS; SUBCUTANEOUS
Status: DISCONTINUED | OUTPATIENT
Start: 2017-01-01 | End: 2017-01-01 | Stop reason: HOSPADM

## 2017-01-01 RX ORDER — HALOPERIDOL 5 MG/ML
1 INJECTION INTRAMUSCULAR ONCE
Status: COMPLETED | OUTPATIENT
Start: 2017-01-01 | End: 2017-01-01

## 2017-01-01 RX ORDER — MORPHINE SULFATE 20 MG/ML
5-10 SOLUTION ORAL
Status: DISCONTINUED | OUTPATIENT
Start: 2017-01-01 | End: 2017-01-01

## 2017-01-01 RX ORDER — LABETALOL HYDROCHLORIDE 5 MG/ML
10 INJECTION, SOLUTION INTRAVENOUS EVERY 4 HOURS PRN
Status: DISCONTINUED | OUTPATIENT
Start: 2017-01-01 | End: 2017-01-01 | Stop reason: HOSPADM

## 2017-01-01 RX ORDER — MORPHINE SULFATE 10 MG/5ML
5-10 SOLUTION ORAL
Status: DISCONTINUED | OUTPATIENT
Start: 2017-01-01 | End: 2017-01-01

## 2017-01-01 RX ORDER — OLANZAPINE 5 MG/1
5 TABLET, ORALLY DISINTEGRATING ORAL EVERY 8 HOURS PRN
Qty: 30 TABLET | Refills: 0 | Status: SHIPPED | OUTPATIENT
Start: 2017-01-01

## 2017-01-01 RX ORDER — GLYCOPYRROLATE 1 MG/1
2 TABLET ORAL EVERY 4 HOURS PRN
Qty: 30 TABLET | Refills: 1 | Status: SHIPPED | OUTPATIENT
Start: 2017-01-01

## 2017-01-01 RX ORDER — LEVOTHYROXINE SODIUM 75 UG/1
75 TABLET ORAL
Status: DISCONTINUED | OUTPATIENT
Start: 2017-01-01 | End: 2017-01-01

## 2017-01-01 RX ORDER — BENZTROPINE MESYLATE 1 MG/1
1-2 TABLET ORAL 3 TIMES DAILY PRN
Status: DISCONTINUED | OUTPATIENT
Start: 2017-01-01 | End: 2017-01-01 | Stop reason: HOSPADM

## 2017-01-01 RX ORDER — SODIUM CHLORIDE 9 MG/ML
INJECTION, SOLUTION INTRAVENOUS CONTINUOUS
Status: DISCONTINUED | OUTPATIENT
Start: 2017-01-01 | End: 2017-01-01

## 2017-01-01 RX ORDER — HALOPERIDOL 5 MG/ML
.5-1 INJECTION INTRAMUSCULAR
Status: DISCONTINUED | OUTPATIENT
Start: 2017-01-01 | End: 2017-01-01 | Stop reason: HOSPADM

## 2017-01-01 RX ORDER — MORPHINE SULFATE 20 MG/ML
2.5 SOLUTION ORAL
Qty: 30 ML | Refills: 0 | Status: SHIPPED | OUTPATIENT
Start: 2017-01-01

## 2017-01-01 RX ORDER — ACETAMINOPHEN 325 MG/1
650 TABLET ORAL EVERY 4 HOURS PRN
COMMUNITY
End: 2017-01-01

## 2017-01-01 RX ORDER — ONDANSETRON 4 MG/1
4 TABLET, ORALLY DISINTEGRATING ORAL EVERY 6 HOURS PRN
Status: DISCONTINUED | OUTPATIENT
Start: 2017-01-01 | End: 2017-01-01

## 2017-01-01 RX ORDER — LORAZEPAM 2 MG/ML
0.5 CONCENTRATE ORAL EVERY 4 HOURS PRN
Qty: 30 ML | Refills: 1 | Status: SHIPPED | OUTPATIENT
Start: 2017-01-01

## 2017-01-01 RX ORDER — ASPIRIN 81 MG/1
81 TABLET ORAL DAILY
Status: DISCONTINUED | OUTPATIENT
Start: 2017-01-01 | End: 2017-01-01 | Stop reason: HOSPADM

## 2017-01-01 RX ORDER — LISINOPRIL 5 MG/1
5 TABLET ORAL DAILY
Status: DISCONTINUED | OUTPATIENT
Start: 2017-01-01 | End: 2017-01-01 | Stop reason: HOSPADM

## 2017-01-01 RX ORDER — IBUPROFEN 100 MG/5ML
400 SUSPENSION, ORAL (FINAL DOSE FORM) ORAL EVERY 6 HOURS PRN
Status: DISCONTINUED | OUTPATIENT
Start: 2017-01-01 | End: 2017-01-01 | Stop reason: HOSPADM

## 2017-01-01 RX ORDER — LABETALOL HYDROCHLORIDE 5 MG/ML
10 INJECTION, SOLUTION INTRAVENOUS
Status: DISCONTINUED | OUTPATIENT
Start: 2017-01-01 | End: 2017-01-01

## 2017-01-01 RX ORDER — OLANZAPINE 10 MG/2ML
5 INJECTION, POWDER, FOR SOLUTION INTRAMUSCULAR DAILY PRN
Status: DISCONTINUED | OUTPATIENT
Start: 2017-01-01 | End: 2017-01-01 | Stop reason: HOSPADM

## 2017-01-01 RX ORDER — CIPROFLOXACIN 250 MG/1
250 TABLET, FILM COATED ORAL EVERY 12 HOURS SCHEDULED
Status: DISCONTINUED | OUTPATIENT
Start: 2017-01-01 | End: 2017-01-01 | Stop reason: HOSPADM

## 2017-01-01 RX ORDER — ATORVASTATIN CALCIUM 20 MG/1
20 TABLET, FILM COATED ORAL DAILY
Status: DISCONTINUED | OUTPATIENT
Start: 2017-01-01 | End: 2017-01-01

## 2017-01-01 RX ORDER — CIPROFLOXACIN 250 MG/1
250 TABLET, FILM COATED ORAL EVERY 12 HOURS SCHEDULED
Status: DISCONTINUED | OUTPATIENT
Start: 2017-01-01 | End: 2017-01-01

## 2017-01-01 RX ORDER — HYDROMORPHONE HYDROCHLORIDE 1 MG/ML
0.2 INJECTION, SOLUTION INTRAMUSCULAR; INTRAVENOUS; SUBCUTANEOUS
Status: DISCONTINUED | OUTPATIENT
Start: 2017-01-01 | End: 2017-01-01 | Stop reason: HOSPADM

## 2017-01-01 RX ORDER — OLANZAPINE 5 MG/1
5 TABLET, ORALLY DISINTEGRATING ORAL 2 TIMES DAILY
Status: DISCONTINUED | OUTPATIENT
Start: 2017-01-01 | End: 2017-01-01 | Stop reason: HOSPADM

## 2017-01-01 RX ORDER — PROCHLORPERAZINE MALEATE 5 MG
5 TABLET ORAL EVERY 6 HOURS PRN
Status: DISCONTINUED | OUTPATIENT
Start: 2017-01-01 | End: 2017-01-01 | Stop reason: HOSPADM

## 2017-01-01 RX ORDER — OLANZAPINE 5 MG/1
5 TABLET, ORALLY DISINTEGRATING ORAL ONCE
Status: DISCONTINUED | OUTPATIENT
Start: 2017-01-01 | End: 2017-01-01 | Stop reason: HOSPADM

## 2017-01-01 RX ORDER — SENNOSIDES 8.6 MG
2 TABLET ORAL DAILY
COMMUNITY
End: 2017-01-01

## 2017-01-01 RX ORDER — LORAZEPAM 2 MG/ML
.5-1 INJECTION INTRAMUSCULAR
Status: DISCONTINUED | OUTPATIENT
Start: 2017-01-01 | End: 2017-01-01 | Stop reason: HOSPADM

## 2017-01-01 RX ORDER — LEVOTHYROXINE SODIUM 75 UG/1
75 TABLET ORAL DAILY
Qty: 90 TABLET | Refills: 3 | Status: ON HOLD | OUTPATIENT
Start: 2017-01-01 | End: 2017-01-01

## 2017-01-01 RX ADMIN — Medication 2.5 MG: at 18:38

## 2017-01-01 RX ADMIN — LEVOTHYROXINE SODIUM 75 MCG: 75 TABLET ORAL at 08:31

## 2017-01-01 RX ADMIN — LEVOTHYROXINE SODIUM 75 MCG: 75 TABLET ORAL at 06:35

## 2017-01-01 RX ADMIN — ATORVASTATIN CALCIUM 10 MG: 10 TABLET, FILM COATED ORAL at 14:30

## 2017-01-01 RX ADMIN — SODIUM CHLORIDE: 9 INJECTION, SOLUTION INTRAVENOUS at 01:29

## 2017-01-01 RX ADMIN — CIPROFLOXACIN HYDROCHLORIDE 250 MG: 250 TABLET, FILM COATED ORAL at 08:53

## 2017-01-01 RX ADMIN — CIPROFLOXACIN HYDROCHLORIDE 250 MG: 250 TABLET, FILM COATED ORAL at 08:31

## 2017-01-01 RX ADMIN — Medication 2.5 MG: at 12:01

## 2017-01-01 RX ADMIN — OLANZAPINE 5 MG: 5 TABLET, ORALLY DISINTEGRATING ORAL at 00:49

## 2017-01-01 RX ADMIN — ASPIRIN 81 MG: 81 TABLET, COATED ORAL at 09:20

## 2017-01-01 RX ADMIN — ASPIRIN 325 MG ORAL TABLET 325 MG: 325 PILL ORAL at 16:19

## 2017-01-01 RX ADMIN — HYDROMORPHONE HYDROCHLORIDE 0.2 MG: 1 INJECTION, SOLUTION INTRAMUSCULAR; INTRAVENOUS; SUBCUTANEOUS at 10:28

## 2017-01-01 RX ADMIN — HYDROMORPHONE HYDROCHLORIDE 0.2 MG: 1 INJECTION, SOLUTION INTRAMUSCULAR; INTRAVENOUS; SUBCUTANEOUS at 09:00

## 2017-01-01 RX ADMIN — SODIUM CHLORIDE: 9 INJECTION, SOLUTION INTRAVENOUS at 07:15

## 2017-01-01 RX ADMIN — SODIUM CHLORIDE: 9 INJECTION, SOLUTION INTRAVENOUS at 21:18

## 2017-01-01 RX ADMIN — LISINOPRIL 5 MG: 5 TABLET ORAL at 09:20

## 2017-01-01 RX ADMIN — OLANZAPINE 5 MG: 10 INJECTION, POWDER, FOR SOLUTION INTRAMUSCULAR at 15:25

## 2017-01-01 RX ADMIN — CEFTRIAXONE 1 G: 1 INJECTION, POWDER, FOR SOLUTION INTRAMUSCULAR; INTRAVENOUS at 13:41

## 2017-01-01 RX ADMIN — LEVOTHYROXINE SODIUM 75 MCG: 75 TABLET ORAL at 10:24

## 2017-01-01 RX ADMIN — CIPROFLOXACIN HYDROCHLORIDE 250 MG: 250 TABLET, FILM COATED ORAL at 20:47

## 2017-01-01 RX ADMIN — CYANOCOBALAMIN TAB 1000 MCG 1000 MCG: 1000 TAB at 08:42

## 2017-01-01 RX ADMIN — CARVEDILOL 3.12 MG: 3.12 TABLET, FILM COATED ORAL at 17:04

## 2017-01-01 RX ADMIN — IOPAMIDOL 120 ML: 755 INJECTION, SOLUTION INTRAVENOUS at 18:29

## 2017-01-01 RX ADMIN — CEFTRIAXONE 1 G: 1 INJECTION, POWDER, FOR SOLUTION INTRAMUSCULAR; INTRAVENOUS at 11:57

## 2017-01-01 RX ADMIN — ASPIRIN 325 MG ORAL TABLET 325 MG: 325 PILL ORAL at 08:31

## 2017-01-01 RX ADMIN — CYANOCOBALAMIN TAB 1000 MCG 1000 MCG: 1000 TAB at 19:48

## 2017-01-01 RX ADMIN — LISINOPRIL 5 MG: 5 TABLET ORAL at 14:30

## 2017-01-01 RX ADMIN — HALOPERIDOL LACTATE 1 MG: 5 INJECTION, SOLUTION INTRAMUSCULAR at 22:28

## 2017-01-01 RX ADMIN — SODIUM CHLORIDE 100 ML: 9 INJECTION, SOLUTION INTRAVENOUS at 18:30

## 2017-01-01 RX ADMIN — OLANZAPINE 5 MG: 5 TABLET, ORALLY DISINTEGRATING ORAL at 11:17

## 2017-01-01 RX ADMIN — ASPIRIN 81 MG: 81 TABLET, COATED ORAL at 08:43

## 2017-01-01 RX ADMIN — CEFTRIAXONE 1 G: 1 INJECTION, POWDER, FOR SOLUTION INTRAMUSCULAR; INTRAVENOUS at 10:31

## 2017-01-01 RX ADMIN — IBUPROFEN 400 MG: 200 SUSPENSION ORAL at 12:17

## 2017-01-01 RX ADMIN — OLANZAPINE 5 MG: 5 TABLET, ORALLY DISINTEGRATING ORAL at 16:50

## 2017-01-01 RX ADMIN — CARVEDILOL 3.12 MG: 3.12 TABLET, FILM COATED ORAL at 09:20

## 2017-01-01 RX ADMIN — HUMAN ALBUMIN MICROSPHERES AND PERFLUTREN 2 ML: 10; .22 INJECTION, SOLUTION INTRAVENOUS at 13:33

## 2017-01-01 RX ADMIN — CIPROFLOXACIN HYDROCHLORIDE 250 MG: 250 TABLET, FILM COATED ORAL at 20:55

## 2017-01-01 RX ADMIN — ASPIRIN 81 MG: 81 TABLET, COATED ORAL at 14:30

## 2017-01-01 RX ADMIN — GADOBUTROL 6 ML: 604.72 INJECTION INTRAVENOUS at 11:58

## 2017-01-01 RX ADMIN — ASPIRIN 325 MG ORAL TABLET 325 MG: 325 PILL ORAL at 08:53

## 2017-01-01 RX ADMIN — ATORVASTATIN CALCIUM 10 MG: 10 TABLET, FILM COATED ORAL at 09:20

## 2017-01-01 RX ADMIN — HALOPERIDOL LACTATE 0.5 MG: 5 INJECTION, SOLUTION INTRAMUSCULAR at 21:17

## 2017-01-01 RX ADMIN — SODIUM CHLORIDE 1000 ML: 9 INJECTION, SOLUTION INTRAVENOUS at 14:20

## 2017-01-01 ASSESSMENT — ENCOUNTER SYMPTOMS
HALLUCINATIONS: 1
CONFUSION: 1
FREQUENCY: 0
DIARRHEA: 0
HEMATURIA: 0
DYSURIA: 0
ABDOMINAL PAIN: 0

## 2017-01-01 ASSESSMENT — ACTIVITIES OF DAILY LIVING (ADL)
COMMUNICATION: 0-->UNDERSTANDS/COMMUNICATES WITHOUT DIFFICULTY
RETIRED_EATING: 2-->ASSISTIVE PERSON
RETIRED_COMMUNICATION: 0-->UNDERSTANDS/COMMUNICATES WITHOUT DIFFICULTY
TRANSFERRING: 0-->INDEPENDENT
BATHING: 2-->ASSISTIVE PERSON
FALL_HISTORY_WITHIN_LAST_SIX_MONTHS: YES
AMBULATION: 0-->INDEPENDENT
TOILETING: 3-->ASSISTIVE EQUIPMENT AND PERSON
RETIRED_EATING: 0-->INDEPENDENT
BATHING: 1-->ASSISTIVE EQUIPMENT
SWALLOWING: 0-->SWALLOWS FOODS/LIQUIDS WITHOUT DIFFICULTY
RETIRED_COMMUNICATION: 0-->UNDERSTANDS/COMMUNICATES WITHOUT DIFFICULTY
EATING: 0-->INDEPENDENT
DRESS: 2-->ASSISTIVE PERSON
TRANSFERRING: 1-->ASSISTIVE EQUIPMENT
COGNITION: 1 - ATTENTION OR MEMORY DEFICITS
DRESS: 0-->INDEPENDENT
WHICH_OF_THE_ABOVE_FUNCTIONAL_RISKS_HAD_A_RECENT_ONSET_OR_CHANGE?: AMBULATION
SWALLOWING: 0-->SWALLOWS FOODS/LIQUIDS WITHOUT DIFFICULTY
FALL_HISTORY_WITHIN_LAST_SIX_MONTHS: YES
TOILETING: 0-->INDEPENDENT
DRESS: 0-->INDEPENDENT
AMBULATION: 1-->ASSISTIVE EQUIPMENT
NUMBER_OF_TIMES_PATIENT_HAS_FALLEN_WITHIN_LAST_SIX_MONTHS: 1
AMBULATION: 3-->ASSISTIVE EQUIPMENT AND PERSON
TOILETING: 1-->ASSISTIVE EQUIPMENT
COGNITION: 1 - ATTENTION OR MEMORY DEFICITS
SWALLOWING: 0-->SWALLOWS FOODS/LIQUIDS WITHOUT DIFFICULTY
TRANSFERRING: 2-->ASSISTIVE PERSON
NUMBER_OF_TIMES_PATIENT_HAS_FALLEN_WITHIN_LAST_SIX_MONTHS: 1
BATHING: 0-->INDEPENDENT

## 2017-01-01 ASSESSMENT — VISUAL ACUITY
OU: NORMAL ACUITY

## 2017-01-17 PROBLEM — R41.3 MEMORY LOSS: Status: ACTIVE | Noted: 2017-01-01

## 2017-01-17 NOTE — MR AVS SNAPSHOT
"              After Visit Summary   1/17/2017    Enoc Taylor    MRN: [id number]           Patient Information     Date Of Birth          1/3/1924        Visit Information        Provider Department      1/17/2017 1:15 PM Isaiah Akhtar MD [facility]        Today's Diagnoses     Acquired hypothyroidism    -  1     Memory loss         Hallucinations         Cough         Need for prophylactic vaccination against Streptococcus pneumoniae (pneumococcus)            Follow-ups after your visit        Your next 10 appointments already scheduled     Feb 10, 2017  1:45 PM   (Arrive by 1:30 PM)   Return Visit with Reece Dean MD   [facility] ([facility])    [address] [place] [address]  3rd Floor  [facility] [address] [phone]              Who to contact     If you have questions or need follow up information about today's clinic visit or your schedule please contact [facility] directly at [phone].  Normal or non-critical lab and imaging results will be communicated to you by MyChart, letter or phone within 4 business days after the clinic has received the results. If you do not hear from us within 7 days, please contact the clinic through MyChart or phone. If you have a critical or abnormal lab result, we will notify you by phone as soon as possible.  Submit refill requests through [employer] or call your pharmacy and they will forward the refill request to us. Please allow 3 business days for your refill to be completed.          Additional Information About Your Visit        Care EveryWhere ID     This is your Care EveryWhere ID. This could be used by other organizations to access your [facility] medical records  [id number]        Your Vitals Were     Pulse Temperature Respirations Height BMI (Body Mass Index) Pulse Oximetry    88 99.2  F (37.3  C) 20 5' 2\" (1.575 m) 23.41 kg/m2 97%    Breastfeeding?             "       No            Blood Pressure from Last 3 Encounters:   01/17/17 126/64   06/24/16 180/64   05/02/16 122/60    Weight from Last 3 Encounters:   01/17/17 128 lb (58.06 kg)   06/24/16 125 lb (56.7 kg)   05/02/16 125 lb (56.7 kg)              We Performed the Following     T4, free     TSH        Primary Care Provider Office Phone # Fax #    Juan C Cage [phone]       [facility] [address]  [facility] [address]        Thank you!     Thank you for choosing [facility]  for your care. Our goal is always to provide you with excellent care. Hearing back from our patients is one way we can continue to improve our services. Please take a few minutes to complete the written survey that you may receive in the mail after your visit with us. Thank you!             Your Updated Medication List - Protect others around you: Learn how to safely use, store and throw away your medicines at www.disposemymeds.org.          This list is accurate as of: 1/17/17  1:51 PM.  Always use your most recent med list.                   Brand Name Dispense Instructions for use    B-12 1000 MCG Tbcr     100 tablet    Take 1,000 mcg by mouth daily       Levothyroxine Sodium 88 MCG Caps     30 capsule    Take 1 tablet by mouth daily

## 2017-01-17 NOTE — PROGRESS NOTES
"  SUBJECTIVE:                                                    Enoc Taylor is a 89 year old female who presents to clinic today for the following health issues:      Hypothyroidism Follow-up      Since last visit, patient describes the following symptoms: Weight stable, no hair loss, no skin changes, no constipation, no loose stools       Amount of exercise or physical activity: None    Problems taking medications regularly: No    Medication side effects: none  Diet: regular (no restrictions)  Discuss other medical concerns today, including \"chronic cough\", and increase in fatigue                Here with her dtr.              Many issues.        She has ongoing  memory issues.   She has disrupted sleep.         She has hallucinations of her 2 sisters in her apt.       she has a neurologic consult next month for the first.                    Lives alone in assisted living; handles her own meds.       States she is taking her thyroid medicine every day, and B12 4 days per week.                            \"almost stopped\" taking naproxen; APAP instead, for back pain.             Also, states she has stopped taking Benadryl.                          She also has a slight cough. Her daughter had some concern about this.   This is of recent onset.   She has no shortness of breath or wheezing fever or chills. She never smoked.       Problem list and histories reviewed & adjusted, as indicated.      Current Outpatient Prescriptions   Medication Sig Dispense Refill     Cyanocobalamin (B-12) 1000 MCG TBCR Take 1,000 mcg by mouth daily 100 tablet 1     Levothyroxine Sodium 88 MCG CAPS Take 1 tablet by mouth daily 30 capsule 11     No Known Allergies  BP Readings from Last 3 Encounters:   01/17/17 126/64   06/24/16 180/64   05/02/16 122/60    Wt Readings from Last 3 Encounters:   01/17/17 128 lb (58.06 kg)   06/24/16 125 lb (56.7 kg)   05/02/16 125 lb (56.7 kg)                    ROS:  CONSTITUTIONAL:NEGATIVE for fever, " "chills, change in weight  RESP:NEGATIVE for cough-productive and hemoptysis  CV: NEGATIVE for chest pain, palpitations or peripheral edema  PSYCHIATRIC: NEGATIVE for agitation and hypersomnia    OBJECTIVE:                                                    /64 mmHg  Pulse 88  Temp(Src) 99.2  F (37.3  C)  Resp 20  Ht 5' 2\" (1.575 m)  Wt 128 lb (58.06 kg)  BMI 23.41 kg/m2  SpO2 97%  Breastfeeding? No  Body mass index is 23.41 kg/(m^2).  GENERAL APPEARANCE: alert and no distress  RESP: no rales or rhonchi  CV: regular rates and rhythm, normal S1 S2, no S3 or S4 and no murmur, click or rub  NEURO: Normal strength and tone and conversant    Diagnostic test results:  Pending      ASSESSMENT/PLAN:                                                        ICD-10-CM    1. Acquired hypothyroidism E03.9 TSH     T4, free   2. Memory loss R41.3    3. Hallucinations R44.3    4. Cough R05    5. Need for prophylactic vaccination against Streptococcus pneumoniae (pneumococcus) Z23        Summary and implications:  We reviewed multiple issues.        Check labs and adjust medications as indicated.   I will defer to neurology the issue of medication suppression of hallucinations.     She likely has a viral related URI causing cough.  We can evaluate this further if symptoms persist.  Follow up with Provider - dayton Akhtar MD  [facility]   Results for orders placed or performed in visit on 01/17/17   TSH   Result Value Ref Range    TSH <0.01 (L) 0.40 - 4.00 mU/L   T4, free   Result Value Ref Range    T4 Free 1.72 (H) 0.76 - 1.46 ng/dL     Letter and Rx sent.                 Your thyroid medication dosage is too high.      I have sent an Rx to your pharmacy for the new dosage of 75 mcg per day.          We should recheck some of these labs again in 3-4 months.              See you then.            "

## 2017-01-17 NOTE — Clinical Note
[facility]  [address]  [address]  [place] [address]  [phone]                                                                                                           Enoc Taylor  [address]   [facility] [place] [facility] [address]    January 18, 2017      Dear Enoc,    The results of your recent tests were reviewed and are enclosed.         Your thyroid medication dosage is too high.      I have sent an Rx to your pharmacy for the new dosage of 75 mcg per day.          We should recheck some of these labs again in 3-4 months.              See you then.          Thank you for choosing [facility].  We appreciate the opportunity to serve you and look forward to supporting your healthcare needs in the future.    If you have any questions or concerns, please call me or my staff at [phone].      Sincerely,    Isaiah Akhtar MD    Results for orders placed or performed in visit on 01/17/17   TSH   Result Value Ref Range    TSH <0.01 (L) 0.40 - 4.00 mU/L   T4, free   Result Value Ref Range    T4 Free 1.72 (H) 0.76 - 1.46 ng/dL

## 2017-01-17 NOTE — NURSING NOTE
Screening Questionnaire for Adult Immunization    Are you sick today?   No   Do you have allergies to medications, food, a vaccine component or latex?   No   Have you ever had a serious reaction after receiving a vaccination?   No   Do you have a long-term health problem with heart disease, lung disease, asthma, kidney disease, metabolic disease (e.g. diabetes), anemia, or other blood disorder?   No   Do you have cancer, leukemia, HIV/AIDS, or any other immune system problem?   No   In the past 3 months, have you taken medications that affect  your immune system, such as prednisone, other steroids, or anticancer drugs; drugs for the treatment of rheumatoid arthritis, Crohn s disease, or psoriasis; or have you had radiation treatments?   No   Have you had a seizure, or a brain or other nervous system problem?   No   During the past year, have you received a transfusion of blood or blood     products, or been given immune (gamma) globulin or antiviral drug?   No   For women: Are you pregnant or is there a chance you could become        pregnant during the next month?   No   Have you received any vaccinations in the past 4 weeks?   No     Immunization questionnaire answers were all negative.      MNVFC doesn't apply on this patient    Per orders of Dr. Akhtar, injection of prevnar given by Luna Lee. Patient instructed to remain in clinic for 20 minutes afterwards, and to report any adverse reaction to me immediately.       Screening performed by Luna Lee on 1/17/2017 at 1:55 PM.

## 2017-01-17 NOTE — NURSING NOTE
"Chief Complaint   Patient presents with     Thyroid Problem     Fatigue       Initial /64 mmHg  Pulse 88  Temp(Src) 97.5  F (36.4  C)  Resp 20  Ht 5' 2\" (1.575 m)  Wt 128 lb (58.06 kg)  BMI 23.41 kg/m2  SpO2 97%  Breastfeeding? No Estimated body mass index is 23.41 kg/(m^2) as calculated from the following:    Height as of this encounter: 5' 2\" (1.575 m).    Weight as of this encounter: 128 lb (58.06 kg).  BP completed using cuff size: richelle Stafford LPN  "

## 2017-01-23 PROBLEM — R41.0 DELIRIUM: Status: ACTIVE | Noted: 2017-01-01

## 2017-01-23 NOTE — PHARMACY-ADMISSION MEDICATION HISTORY
Admission medication history interview status for the 1/23/2017  admission is complete. See EPIC admission navigator for prior to admission medications     Medication history source reliability:Good, patient and daughter    Actions taken by pharmacist (provider contacted, etc):None     Additional medication history information not noted on PTA med list :None    Medication reconciliation/reorder completed by provider prior to medication history? Yes    Time spent in this activity: 5 minutes    Prior to Admission medications    Medication Sig Last Dose Taking? Auth Provider   IBUPROFEN PO Take 200 mg by mouth every 8 hours as needed for moderate pain prn Yes Unknown, Entered By History   levothyroxine (SYNTHROID/LEVOTHROID) 75 MCG tablet Take 1 tablet (75 mcg) by mouth daily 1/23/2017 at am Yes Isaiah Akhtar MD   Cyanocobalamin (B-12) 1000 MCG TBCR Take 1,000 mcg by mouth daily Past Week at Unknown time Yes Juan C Cage MD

## 2017-01-23 NOTE — IP AVS SNAPSHOT
` ` Patient Information     Patient Name Sex     Enoc Taylor ([id number]) Female 1/3/1924       Room Bed    30 8830-02      Patient Demographics     Address Phone    [address]   [facility] [id number] [phone] (Home)      Patient Ethnicity & Race     Ethnic Group Patient Race    American White      Emergency Contact(s)     Name Relation Home Work Mobile    MANE TAYLOR [phone]    Micki Krause [phone]        Documents on File        Status Date Received Description       Documents for the Patient    Privacy Notice - [place] Received 08     Insurance Card  08     Insurance Card  09     Face Sheet Received 09     External Medication Information Consent Accepted 09     Insurance Card  09     Waiver - Payment  09     Privacy Notice - [place]  09     ALONDRA Face Sheet Received 09     External Medication Information Consent Accepted 08/18/10     Face Sheet Received 08/18/10     HIM GHASSAN Authorization   [facility] placeholder       Consent for Services - Hospital/Clinic Received 14     Consent for EHR Access Received 14     Patient ID Received 17     [facility] Specified Other       External Medication Information Consent Accepted 14     Consent to Communicate   Consent to Communicate    Insurance Card Received 06/22/15 medica    Consent for Services/Privacy Notice - Hospital/Clinic Received 04/15/16 verbal    Insurance Card Received 16 medica    Consent for Services - [facility]       Consent for Services/Privacy Notice - Hospital/Clinic-[facility] Received 17     Insurance Card Received 17 medica    Advance Directives and Living Will Not Received  Anatomical Body Bequest 2017    Advance Directives and Living Will Not Received  Invalid Directive dated 16 received       Documents for the Encounter    CMS IM for Patient Signature Received 17 1MM     Observation Notice Received 01/24/17       Admission Information     Attending Provider Admitting Provider Admission Type Admission Date/Time    Tomy Aguilar MD Bhattarai, Nimesh, MD Emergency 01/23/17  1347    Discharge Date Hospital Service Auth/Cert Status Service Area     Hospitalist [facility]    Unit Room/Bed Admission Status     88 ONCOLOGY 8830/8830-02 Admission (Confirmed)            Admission     Complaint    Delirium, General weakness      Hospital Account     Name Acct ID Class Status Primary Coverage    Enoc Taylor [id number] Inpatient Open MEDICARE - MEDICARE FOR HB SUPPLEMENT            Guarantor Account (for Hospital Account #[id number])     Name Relation to Pt Service Area Active? Acct Type    Enoc Taylor  FCS Yes Personal/Family    Address Phone          [address] [place] [address] [phone](H)              Coverage Information (for Hospital Account #[id number])     1. MEDICARE/MEDICARE FOR HB SUPPLEMENT     F/O Payor/Plan Precert #    MEDICARE/MEDICARE FOR HB SUPPLEMENT     Subscriber Subscriber #    Enoc Taylor [id number]    Address Phone    [employer] CLAIM[employer]  [address]  [place] [address] [phone]          2. MEDICA/MEDICA PRIME SOLUTION     F/O Payor/Plan Precert #    MEDICA/MEDICA PRIME SOLUTION     Subscriber Subscriber #    Enoc Taylor [id number]    Address Phone    [address] [address]  [place] [address] [phone]

## 2017-01-23 NOTE — ED PROVIDER NOTES
History     Chief Complaint:  Altered Mental Status    EDEN Taylor is a 89 year old female who presents to the emergency department today for evaluation of altered mental status. The patient had a sudden onset of confusion while shopping at [employer] today around 0900. She only remembers going shopping but does not remember very many details. 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 Symptoms have resolved now except she can't remember what happened this morning. Her unsteady gait is gone. She recently had her Levothyroxine lowered. The patient denies chest pain, shortness of breath, abdominal pain, diarrhea, hematuria, dysuria, urgency, or frequency changes.     Allergies:  No Known Drug Allergies      Medications:    Synthroid  Cyanocobalamin    Past Medical History:    Thyrotoxic exophthalmos  Hypothyroidism  Chest pain  Chronic rhinitis  HPC     Past Surgical History:    Cyst removal lower lip  Appendectomy  Cyst removal right shoulder  Tonsillectomy  Rotator cuff repair bilateral  Repair foot arch left  Excess bone removal from left foot  Cataract right eye  Thyroid ablation     Family History:    History reviewed. No pertinent family history.      Social History:  The patient was accompanied to the ED by daughter.  Smoking Status: never smoker  Alcohol Use: positive    Marital Status:   [5]    Review of Systems   Cardiovascular: Negative for chest pain.   Gastrointestinal: Negative for abdominal pain and diarrhea.   Genitourinary: Negative for dysuria, urgency, frequency and hematuria.   Musculoskeletal: Positive for gait problem.   Psychiatric/Behavioral: Positive for hallucinations and confusion.   All other systems reviewed and are negative.    Physical Exam   Vitals:  Patient Vitals for the past 24  hrs:   BP Temp Temp src Pulse Resp SpO2   01/23/17 1343 180/48 mmHg 97.6  F (36.4  C) Oral 83 16 98 %       Physical Exam  General: Resting comfortably on the gurney  Eyes:  The pupils are equal and round  ENT:    Atraumatic  Neck:  Normal range of motion  CV:  Regular rate and rhythm    Skin warm and well perfused   Resp:  Lungs are clear    Non-labored    No rales    No wheezing   GI:  Abdomen is soft, there is no rigidity    No distension    No rebound tenderness    No abdominal tenderness   MS:  Normal muscular tone  Skin:  No rash or acute skin lesions noted  Neuro:   Awake, alert.      Speech is normal and fluent.    Finger to nose intact    Oriented x3    Face is symmetric.     Moves all extremities equally    Finger to nose intact  Psych:  Normal affect.  Appropriate interactions.    Emergency Department Course     ECG:  ECG taken at 1441, ECG read at 1446  Normal sinus rhythm  Left axis deviation   Left ventricular hypertrophy  With repolarization abnormality  Anteroseptal infarct, age undetermined  Abnormal ECG  Rate 79 bpm. VA interval 206. QRS duration 114. QT/QTc 398/456. P-R-T axes 47 -41 114.     ECG taken at 1544, ECG read at 1546  Sinus rhythm with 1st degree AV block  Left axis deviation  Left ventricular hypertrophy with QRS widening  Anteroseptal infarct, age undetermined  ST & T wave abnormality, consider lateral ischemia  Abnormal ECG   Rate 81 bpm. VA interval 218. QRS duration 116. QT/QTc 406/471. P-R-T axes 69 -41 108.     Imaging:  Radiology findings were communicated with the patient and family who voiced understanding of the findings.    XR Chest 2 Views   IMPRESSION: The lungs are clear. No focal pulmonary opacities. No   pleural effusions or pneumothorax. Stable heart size. There is   atherosclerotic calcification of the aortic arch.      JAMES RAMIREZ MD      Head CT w/o contrast   IMPRESSION:  Diffuse cerebral volume loss and cerebral white matter   changes consistent with chronic  small vessel ischemic disease. No   evidence for acute intracranial pathology.      Radiation dose for this scan was reduced using automated exposure   control, adjustment of the mA and/or kV according to patient size, or   iterative reconstruction technique.      JAZMIN GRAMAJO MD     Laboratory:  Laboratory findings were communicated with the patient who voiced understanding of the findings.    CBC: WBC 7.9, HGB 12.0,   CMP: Glucose: 119 (H), Protein Total: 6.6 (L) o/w WNL (Creatinine: 0.73)  Troponin (Collected 1406): 0.269 (HH)   TSH with free T4 reflex: <0.01 (L)  T4 free: 1.52 (H)  UA: Leukocyte Esterase: Moderate (A)     Interventions:  1420 NS 1000 ml IV   1619 Aspirin 325 mg oral      Emergency Department Course:  Nursing notes and vitals reviewed.  I performed an exam of the patient as documented above.   IV was inserted and blood was drawn for laboratory testing, results above.  The patient was sent for a head CT without contrast and chest x-ray  while in the emergency department, results above.    The patient provided a urine sample here in the emergency department. This was sent for laboratory testing, findings above.   3:50 PM: I spoke with Dr. Matos of the hospitalist service regarding patient's presentation, findings, and plan of care.   I discussed the treatment plan with the patient. They expressed understanding of this plan and consented to admission. I discussed the patient with Dr. Matos, who will admit the patient to a monitored bed for further evaluation and treatment.   I personally reviewed the laboratory and imaging results with the patient and daughter and answered all related questions prior to discharge.    Impression & Plan      Medical Decision Making:  Enoc Taylor is a 89 year old female who presents to the emergency department today for evaluation of altered mental status. Differential for this includes TIA verse stroke verse seizure verse encephalopathy. CT head performed,  has chronic ischemic changes but no acute findings. Troponin is elevated to 0.269. She is denying any chest pain or shortness of breath to suggest acute cardiac issue. EKG obtained and shows acute sinus rhythm without acute ischemic changes. Likely demand ischemia. Discussed patient with hospitalist who agreed to accept the patient. May need MRI brain. Considered infection but she has no infectious symptoms on history or exam with no fever, tachycardia or symptoms. Patient was given Aspirin. Discussed patient with hospitalist who agreed to accept the patient.     Diagnosis:    ICD-10-CM    1. TIA (transient ischemic attack) G45.9 TSH with free T4 reflex     T4 free     T4 free     *UA reflex to Microscopic     *UA reflex to Microscopic     CANCELED: UA with Microscopic     Disposition:   Admission    Scribe Disclosure:  I, William Galvan, am serving as a scribe at 3:00 PM on 1/23/2017 to document services personally performed by Stephanie De Los Santos MD, based on my observations and the provider's statements to me.   1/23/2017    EMERGENCY DEPARTMENT        Stephanie De Los Santos MD  01/23/17 7334

## 2017-01-23 NOTE — ED NOTES
DATE:  1/23/2017   TIME OF RECEIPT FROM LAB:  7527  LAB TEST:  trop  LAB VALUE:  0.269  RESULTS GIVEN WITH READ-BACK TO (PROVIDER):  [place]  TIME LAB VALUE REPORTED TO PROVIDER:   3492

## 2017-01-23 NOTE — H&P
PRIMARY CARE PROVIDER:  Juan C Cage MD      CHIEF COMPLAINT:  Confusion and hallucination.      HISTORY OF PRESENT ILLNESS:  Ms. Enoc Taylor is a delightful, 89-year-old  lady with a past medical history notable for mild to moderate memory impairment, hypothyroidism, and chronic rhinitis who currently resides in a senior living facility independently.  The patient is not able to provide any credible information due to her memory issues, and therefore, history was obtained from her daughter who was present at bedside at the time of interview.  Reportedly, earlier around 10:00 a.m., people from her residence were taken to the [facility] for grocery shopping.  It was noted at the cash register that she was somewhat confused and not able to pay for her purchased goods.  When she was taken back to her apartment, she was noted to be disoriented, unsteady, and not able to complete her speech, although her speech was normal.  In her room, she had visual hallucination, seeing a calendar on the window which was not there.  According to the daughter, for the past couple of months she has had visual hallucinations about her sister who lives in [place].  There is a history of similar acute confusion but more pronounced reportedly around 03/2016, which was due to overtreatment with levothyroxine, as well as alcohol intoxications.  On direct questioning, there is no report of headache, double vision, blurry vision, slurred speech, focal weakness, fever, chills, cough, sputum, chest pain, dyspnea, nausea, vomiting, diarrhea or urinary symptoms.      In the Emergency Department, the patient has been evaluated by the ER attending physicians.  The chemistry profile is essentially unremarkable.  Notably, her TSH is less than 0.01.  It was also low on 01/17 when the dose of her levothyroxine was reduced to 75 mcg daily.  The hematology profile shows normal white count of 7.9 and hemoglobin of 12.  Chest x-ray shows no acute  cardiopulmonary disease.  CT scan of the head without contrast reveals diffuse cerebral volume loss and cerebral white matter changes consistent with chronic small-vessel ischemic disease, but no evidence for acute intracranial pathology.  The electrocardiogram reveals normal sinus rhythm with a rate of 79 beats per minute, left axis deviation, LVH with repolarization pattern and poor R-wave progression in anterior leads, unchanged compared to EKG of 2007.  For no clear reason, troponin I has been checked in the ED and is mildly elevated at 0.269.  The patient reports no chest pain or dyspnea.  She is being admitted to the hospital under observation status.      PAST MEDICAL HISTORY:   1.  Mild to moderate memory impairment.   2.  Acquired hypothyroidism following ablation for hyperthyroidism.  3.  Chronic rhinitis.   4.  HPV infection.   5.  History of acute toxic encephalopathy in 2016 due to overtreatment with levothyroxine, as well as alcohol intoxication.      PAST SURGICAL HISTORY:   1.  Appendectomy.   2.  Bilateral rotator cuff repair.   3.  Repair of the left foot arch.   4.  Right cataract surgery.   5.  Removal of a cyst from lower lip.   6.  Removal of cyst from right shoulder.   7.  Removal of excess bone from left foot.      FAMILY HISTORY:  Negative for premature coronary artery disease or stroke.  Mother  at the age of 100.  Health history about her biologic father is unknown.      SOCIAL HISTORY:  The patient is .  She resides in a senior living apartment independently.  She denies smoking.  She drinks on average 5-6 alcoholic beverages a week.  At times, she does use a walker for ambulation.      MEDICATIONS:   1.  Cyanocobalamin 1000 mcg p.o. daily.   2.  Ibuprofen 200 mg p.o. every 8 hours p.r.n. pain.   3.  Levothyroxine 75 mcg p.o. daily.      ALLERGIES:  No known drug allergies.      REVIEW OF SYSTEMS:  A 10-point review was performed thoroughly.  It was negative except as stated  in history of present illness.      PHYSICAL EXAMINATION:   GENERAL:  This patient is a very pleasant elderly lady who is in no acute distress.   VITAL SIGNS:  Blood pressure 180/48, heart rate 83, respiratory rate 16, temperature 97.6 degrees Fahrenheit, oxygen saturation 98% on room air.   HEENT:  The pupils are round, equal, reactive to light bilaterally.  There is no conjunctival pallor or scleral icterus.  The oral mucosa appears moist.   NECK:  Supple.  There is no elevated JVP.   LUNGS:  Clear to auscultation bilaterally.  No crackles, wheezing or rhonchi are audible.   CARDIOVASCULAR:  There is normal S1, S2 with regular rate and rhythm.  I could not appreciate any murmur.   ABDOMEN:  Soft, nontender, nondistended.  Bowel sounds are present.   EXTREMITIES:  There is no calf tenderness or lower extremity edema.   SKIN:  There is no jaundice, cyanosis or acute rash.   NEUROLOGIC:  She is awake and alert.  The patient knows that she is in the hospital, but cannot recall the name.  She is not sure about the date or year.  She is able to recognize her daughter.  She does not know why she has been admitted to the Emergency Department.  On examination of cranial nerves II-XII, speech is normal.  There is facial asymmetry.  She is able to protrude her tongue in the midline with no deviation.  The motor strength is 5/5 throughout the extremities.  The gait was not tested.      DIAGNOSTIC DATA:   1.  Chemistry profile:  Sodium 140, potassium 4.1, BUN 20, creatinine 0.73, calcium 8.6, albumin 2.5, total protein 6.6, total bilirubin 0.4, alkaline phosphatase is 93, ALT 15, AST 18, glucose 119.   2.  Hematology profile:  White count 7.9, hemoglobin 12, platelet count 310,000, MCV 87.   3.  TSH less than 0.01.   4.  Troponin I is 0.269.   5.  Chest x-ray:  It shows clear lungs, no focal pulmonary opacities, known pleural effusion or pneumothorax and atherosclerotic calcification of the aortic arch.   6.  CT head without  contrast:  It does show diffuse cerebral volume loss and cerebral white matter changes consistent with chronic small-vessel ischemic disease.  No evidence for acute intracranial pathology.   7.  A 12-lead electrocardiogram:  It shows normal sinus rhythm with rate of 79 beats per minute, left axis deviation, LVH with depolarization pattern and poor R-wave progression in anterior leads.      ASSESSMENT AND PLAN:  Ms. Enoc Kahn is a 89-year-old  lady with a past medical history notable for mild to moderate memory loss, hypothyroidism and recent history of hallucinations who has presented with acute confusion.   1.  Acute confusion:  The history is highly suggestive of delirium.  The etiology is unclear.  There is no evidence for localizing infection.  CT scan of the head without contrast is negative for acute intracranial pathology.  The patient has no fever, elevated white count and no infiltrate on chest x-ray.  In view of memory loss along with hallucinations, Lewy body dementia is in the differential.  I will obtain a urinalysis to make sure there is no urinary tract infection, although the patient does not complain of urinary symptoms.  Seizure or TIA are deemed unlikely in the light of presentation.  I will place a consult for PT/OTevaluation and therapy.  I will ask the Neurology Service to evaluate the patient.  I will hold off on further imaging, await more input from Neurology Service. I will order delirium protocol and have Seroquel available prn.  2.  Hypothyroidism:  The patient has a history of hyperthyroidism treated with thyroid ablation several years ago with subsequent development of acquired hypothyroidism.  Her recent TSH was less than 0.01 on 01/17/2017 and the dose of levothyroxine was reduced to 75 mcg p.o. daily by her primary care provider.  Today's TSH is less than 0.01, but it is too early to adjust her thyroid replacement therapy again.  A TSH should be repeated in 4 weeks.   3.   Mild to moderate memory impairment:  As outlined above, the patient might have Lewy body dementia in view of hallucinations.  The patient follows with Dr. Dean.  During this hospitalization, Neurology consult will be obtained.  After discharge, she can follow up with her primary neurologist.   4.  Hypertension:  The blood pressure in the Emergency Room is elevated at 180/48.  Her blood pressure was normal back on 01/17/2017 during an office visit.  It could be simply reactive.  She has no previous history of hypertension.  I will have intravenous labetalol available for systolic blood pressure more than 180.  Her blood pressure will be monitored.  At this juncture, there is no indication for medical treatment unless her blood pressure remains persistently elevated.   5.  Elevated troponin I:  Troponin I has been checked in the Emergency Department with no true indication, which is slightly elevated at 0.269.  The patient has no chest pain or shortness of breath and electrocardiogram reveals normal sinus rhythm with a rate of 79 beats per minute, left axis deviation, left ventricular hypertrophy with depolarization pattern and poor R-wave progression in anterior leads, unchanged compared to EKG of 2007.  I suspect this is falsely positive.  However, I will keep the patient on telemetry and repeat troponin I levels to ensure there is no silent myocardial infarction.  For now, she will be treated with aspirin 81 mg p.o. daily.   6.  Deep venous thrombosis prophylaxis:  Pneumatic compression device.   7.  Code status:  It was discussed with the daughter, who is the durable power of , and DO NOT RESUSCITATE/DO NOT INTUBATE was established.   8.  Disposition:  Anticipate less than 48 hours of hospital stay.      I would like to thank Dr. Juan C Cage for allowing the Hospitalist Service to participate in the care of this patient.         CHANDAN AGOSTO MD             D: 01/23/2017 16:34   T: 01/23/2017  17:46   MT:       Name:     JESUS ORELLANA   MRN:      [id number]        Account:      [id number]   :      1924           Admitted:     [id number]      Document: [id number]       cc: Juan C Cage MD

## 2017-01-23 NOTE — TELEPHONE ENCOUNTER
Enoc Taylor is a 89 year old female who calls with confusion.    NURSING ASSESSMENT:  Description: Dtr Kit reports pt is very confused since this morning, delucional and unsteady. Per dtr pt has no recollection of events from this morning.   Onset/duration:  1 days  Precip. factors:  unknown  Associated symptoms:  unknown  Improves/worsens symptoms: non  Pain scale (0-10)   n/a  LMP/preg/breast feeding:  none  Last exam/Treatment:  01/17/2017  Allergies: No Known Allergies    MEDICATIONS:   Taking medication(s) as prescribed? Yes  Taking over the counter medication(s?) unknown -dtr reports no changes.  Any medication side effects? No significant side effects    Any barriers to taking medication(s) as prescribed?  No  Medication(s) improving/managing symptoms?  No  Medication reconciliation completed: No      NURSING PLAN: Nursing advice to patient See at ED.    RECOMMENDED DISPOSITION:  To ED, another person to drive - Dtr agreed to take pt to ED.  Will comply with recommendation: Yes  If further questions/concerns or if symptoms do not improve, worsen or new symptoms develop, call your PCP or [facility] as soon as possible.      Guideline used:  Telephone Triage Protocols for Nurses, Fourth Edition, Tiffany Moreno RN

## 2017-01-23 NOTE — IP AVS SNAPSHOT
` [place] [address] ONCOLOGY: [phone]                 INTERAGENCY TRANSFER FORM - NOTES (H&P, Discharge Summary, Consults, Procedures, Therapies)   2017                    Hospital Admission Date: 2017  ENOC TAYLOR   : 1/3/1924  Sex: Female        Patient PCP Information     Provider PCP Type    Juan C Cage MD General         History & Physicals      H&P signed by Dax Matos MD at 2017  6:21 PM      Author:  Dax Matos MD Service:  Hospitalist Author Type:  Physician    Filed:  2017  6:21 PM Note Time:  2017  4:34 PM Status:  Signed    :  Dax Matos MD (Physician)           PRIMARY CARE PROVIDER:  Juan C Cage MD      CHIEF COMPLAINT:  Confusion and hallucination.      HISTORY OF PRESENT ILLNESS:  Ms. Enoc Taylor is a delightful, 89-year-old  lady with a past medical history notable for mild to moderate memory impairment, hypothyroidism, and chronic rhinitis who currently resides in a senior living facility independently.  The patient is not able to provide any credible information due to her memory issues, and therefore, history was obtained from her daughter who was present at bedside at the time of interview.  Reportedly, earlier around 10:00 a.m., people from her residence were taken to the [employer] for grocery shopping.  It was noted at the cash register that she was somewhat confused and not able to pay for her purchased goods.  When she was taken back to her apartment, she was noted to be disoriented, unsteady, and not able to complete her speech, although her speech was normal.  In her room, she had visual hallucination, seeing a calendar on the window which was not there.  According to the daughter, for the past couple of months she has had visual hallucinations about her sister who lives in [place].  There is a history of similar acute confusion but more pronounced reportedly around 2016, which was due to  overtreatment with levothyroxine, as well as alcohol intoxications.  On direct questioning, there is no report of headache, double vision, blurry vision, slurred speech, focal weakness, fever, chills, cough, sputum, chest pain, dyspnea, nausea, vomiting, diarrhea or urinary symptoms.      In the Emergency Department, the patient has been evaluated by the ER attending physicians.  The chemistry profile is essentially unremarkable.  Notably, her TSH is less than 0.01.  It was also low on 01/17 when the dose of her levothyroxine was reduced to 75 mcg daily.  The hematology profile shows normal white count of 7.9 and hemoglobin of 12.  Chest x-ray shows no acute cardiopulmonary disease.  CT scan of the head without contrast reveals diffuse cerebral volume loss and cerebral white matter changes consistent with chronic small-vessel ischemic disease, but no evidence for acute intracranial pathology.  The electrocardiogram reveals normal sinus rhythm with a rate of 79 beats per minute, left axis deviation, LVH with repolarization pattern and poor R-wave progression in anterior leads, unchanged compared to EKG of 2007.  For no clear reason, troponin I has been checked in the ED and is mildly elevated at 0.269.  The patient reports no chest pain or dyspnea.  She is being admitted to the hospital under observation status.      PAST MEDICAL HISTORY:   1.  Mild to moderate memory impairment.   2.  Acquired hypothyroidism following ablation for hyperthyroidism.  3.  Chronic rhinitis.   4.  HPV infection.   5.  History of acute toxic encephalopathy in 03/2016 due to overtreatment with levothyroxine, as well as alcohol intoxication.      PAST SURGICAL HISTORY:   1.  Appendectomy.   2.  Bilateral rotator cuff repair.   3.  Repair of the left foot arch.   4.  Right cataract surgery.   5.  Removal of a cyst from lower lip.   6.  Removal of cyst from right shoulder.   7.  Removal of excess bone from left foot.      FAMILY HISTORY:   Negative for premature coronary artery disease or stroke.  Mother  at the age of 100.  Health history about her biologic father is unknown.      SOCIAL HISTORY:  The patient is .  She resides in a senior living apartment independently.  She denies smoking.  She drinks on average 5-6 alcoholic beverages a week.  At times, she does use a walker for ambulation.      MEDICATIONS:   1.  Cyanocobalamin 1000 mcg p.o. daily.   2.  Ibuprofen 200 mg p.o. every 8 hours p.r.n. pain.   3.  Levothyroxine 75 mcg p.o. daily.      ALLERGIES:  No known drug allergies.      REVIEW OF SYSTEMS:  A 10-point review was performed thoroughly.  It was negative except as stated in history of present illness.      PHYSICAL EXAMINATION:   GENERAL:  This patient is a very pleasant elderly lady who is in no acute distress.   VITAL SIGNS:  Blood pressure 180/48, heart rate 83, respiratory rate 16, temperature 97.6 degrees Fahrenheit, oxygen saturation 98% on room air.   HEENT:  The pupils are round, equal, reactive to light bilaterally.  There is no conjunctival pallor or scleral icterus.  The oral mucosa appears moist.   NECK:  Supple.  There is no elevated JVP.   LUNGS:  Clear to auscultation bilaterally.  No crackles, wheezing or rhonchi are audible.   CARDIOVASCULAR:  There is normal S1, S2 with regular rate and rhythm.  I could not appreciate any murmur.   ABDOMEN:  Soft, nontender, nondistended.  Bowel sounds are present.   EXTREMITIES:  There is no calf tenderness or lower extremity edema.   SKIN:  There is no jaundice, cyanosis or acute rash.   NEUROLOGIC:  She is awake and alert.  The patient knows that she is in the hospital, but cannot recall the name.  She is not sure about the date or year.  She is able to recognize her daughter.  She does not know why she has been admitted to the Emergency Department.  On examination of cranial nerves II-XII, speech is normal.  There is facial asymmetry.  She is able to protrude her tongue  in the midline with no deviation.  The motor strength is 5/5 throughout the extremities.  The gait was not tested.      DIAGNOSTIC DATA:   1.  Chemistry profile:  Sodium 140, potassium 4.1, BUN 20, creatinine 0.73, calcium 8.6, albumin 2.5, total protein 6.6, total bilirubin 0.4, alkaline phosphatase is 93, ALT 15, AST 18, glucose 119.   2.  Hematology profile:  White count 7.9, hemoglobin 12, platelet count 310,000, MCV 87.   3.  TSH less than 0.01.   4.  Troponin I is 0.269.   5.  Chest x-ray:  It shows clear lungs, no focal pulmonary opacities, known pleural effusion or pneumothorax and atherosclerotic calcification of the aortic arch.   6.  CT head without contrast:  It does show diffuse cerebral volume loss and cerebral white matter changes consistent with chronic small-vessel ischemic disease.  No evidence for acute intracranial pathology.   7.  A 12-lead electrocardiogram:  It shows normal sinus rhythm with rate of 79 beats per minute, left axis deviation, LVH with depolarization pattern and poor R-wave progression in anterior leads.      ASSESSMENT AND PLAN:  Ms. Enoc Kahn is a 89-year-old  lady with a past medical history notable for mild to moderate memory loss, hypothyroidism and recent history of hallucinations who has presented with acute confusion.   1.  Acute confusion:  The history is highly suggestive of delirium.  The etiology is unclear.  There is no evidence for localizing infection.  CT scan of the head without contrast is negative for acute intracranial pathology.  The patient has no fever, elevated white count and no infiltrate on chest x-ray.  In view of memory loss along with hallucinations, Lewy body dementia is in the differential.  I will obtain a urinalysis to make sure there is no urinary tract infection, although the patient does not complain of urinary symptoms.  Seizure or TIA are deemed unlikely in the light of presentation.  I will place a consult for PT/OTevaluation and  therapy.  I will ask the Neurology Service to evaluate the patient.  I will hold off on further imaging, await more input from Neurology Service. I will order delirium protocol and have Seroquel available prn.  2.  Hypothyroidism:  The patient has a history of hyperthyroidism treated with thyroid ablation several years ago with subsequent development of acquired hypothyroidism.  Her recent TSH was less than 0.01 on 01/17/2017 and the dose of levothyroxine was reduced to 75 mcg p.o. daily by her primary care provider.  Today's TSH is less than 0.01, but it is too early to adjust her thyroid replacement therapy again.  A TSH should be repeated in 4 weeks.   3.  Mild to moderate memory impairment:  As outlined above, the patient might have Lewy body dementia in view of hallucinations.  The patient follows with Dr. Dean.  During this hospitalization, Neurology consult will be obtained.  After discharge, she can follow up with her primary neurologist.   4.  Hypertension:  The blood pressure in the Emergency Room is elevated at 180/48.  Her blood pressure was normal back on 01/17/2017 during an office visit.  It could be simply reactive.  She has no previous history of hypertension.  I will have intravenous labetalol available for systolic blood pressure more than 180.  Her blood pressure will be monitored.  At this juncture, there is no indication for medical treatment unless her blood pressure remains persistently elevated.   5.  Elevated troponin I:  Troponin I has been checked in the Emergency Department with no true indication, which is slightly elevated at 0.269.  The patient has no chest pain or shortness of breath and electrocardiogram reveals normal sinus rhythm with a rate of 79 beats per minute, left axis deviation, left ventricular hypertrophy with depolarization pattern and poor R-wave progression in anterior leads, unchanged compared to EKG of 2007.  I suspect this is falsely positive.  However, I will  keep the patient on telemetry and repeat troponin I levels to ensure there is no silent myocardial infarction.  For now, she will be treated with aspirin 81 mg p.o. daily.   6.  Deep venous thrombosis prophylaxis:  Pneumatic compression device.   7.  Code status:  It was discussed with the daughter, who is the durable power of , and DO NOT RESUSCITATE/DO NOT INTUBATE was established.   8.  Disposition:  Anticipate less than 48 hours of hospital stay.      I would like to thank Dr. Juan C Cage for allowing the Hospitalist Service to participate in the care of this patient.         CHANDAN AGOSTO MD             D: 2017 16:34   T: 2017 17:46   MT: TS      Name:     ENOC TAYLOR   MRN:      [id number]        Account:      [id number]   :      1924           Admitted:     [id number]      Document: [id number]       cc: Juan C Cage MD              Discharge Summaries     No notes of this type exist for this encounter.         Consult Notes      Consults by Chepe Minor MD at 2017  1:52 PM     Author:  Chepe Minor MD Service:  Palliative Author Type:  Physician    Filed:  2017  4:23 PM Note Time:  2017  1:52 PM Status:  Signed    :  Chepe Minor MD (Physician)           [facility]  Palliative Care Consultation    Enoc Taylor MRN# [id number]   Age: 89 year old YOB: 1924   Date of Admission: 2017  Date of Service:  2017  Reason for consult: Goals of care       Requesting physician/service: Dr Alvarado/Hospitalist       Orders & Recommendations      - Family considering discharge with hospice; hospice liaison RN will meet with pt/family for informational meeting. SW sorting through placement options  - Her UA indicated infection, culture >100K mixed natacha.  While it seems important to avoid discomfort from UTI, it also seems important to help her transition out of the hospital while she has the  "strength to do so. Would it be appropriate to give her 3rd dose Rocephin tomorrow and end treatment of uncomplicated UTI at that time (or continue oral if needed as tolerated)?  - Family had questions about need for speech eval.  We recommended eat for comfort as tolerated if they continue a comfort focused pathway, family is considering whether they want speech to see for \"tips on swallowing\".  - She's having generalized MSK aches, probably from being bedridden at this point. She doesn't want scheduled APAP at this time, but make available prn    - If she does go ahead with hospice enrollment, please write for the following hospice meds at discharge:      - Lorazepam elixir 0.5-1 mg q 6 hours prn restlessness/anxiety  - Bisacodyl 10 mg Suppository LA daily to bid prn constipation  - Tylenol 650 mg suppository PO/LA q6hr prn pain, fever  - Atropine sulfate 1% opth solution 1-2 drops SL q2h prn secretions  - Senna 8.6 mg 1-2 tabs PO prn constipation  - Haldol 1-2 mg PO/SL/LA q6h prn nausea, agitation or delirium.    - Roxanol oral morphine elixir 2.5-5 mg up to q 4 hours prn pain/dyspnea    Chepe Minor MD  Palliative Medicine Consult Team  Pager: [phone]   TT: 70 minutes, with > 50% spent in C/C/E patient/family/care teams re: GOC, POC, Sx management. [id number]      Disease Process/es   89-year-old woman with NSTEMI, new ischemic CVA.  New ischemicCVA  NSTEMI  Delirium  Hx mild-moderate cognitive impairment  Symptoms     Generalized MSK pain, likely 2/2 decreased mobility        Support/Coping   Her daughter and granddaughter are at bedside     Decision-Making & Goals of Care     Discussion/counseling today about goals of care/decisions:   Met with pt/family. Introduced the scope of our practice. Discussed our potential roles in symptom management, support/coping, and decisional support (aka goals of care)  Patient has decision-making capacity (Y/N): Yes  Health care directive: Invalid document on chart  Surrogate " Decision-Maker (include rationale): Daughter  Code Status: DNR/DNI   Physician orders for life-sustaining treatment (POLST) form is not completed    Coordination of Care     Findings & plan of care discussed with: Primary team    Thank you for involving us in the care of this patient and family. We will continue to follow. Most patients are seen by a member of our Palliative team daily Monday through Friday. An on-call MD is available for urgent issues Saturday & Sunday.     Please do not hesitate to contact me with questions or concerns (or the on-call provider for our team if evening or weekend).    Chepe Minor MD  Palliative Medicine Consult Team  Pager: [phone]     Total Time: 75  Counseling & Coordination Time: 60 minutes  [id number]          Chief Complaint     GOC planning in setting of new NSTEMI and CVA         History of Present Illness     89 y/o womanwith a past medical history notable for mild to moderate memory impairment, hypothyroidism, and chronic rhinitis who currently resides in a senior living facility independently.  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 Rios in 5/08, pap with ascus and positive HPV (58 & 81)- pt declines colpo,               Past Surgical History:   Past Surgical History   Procedure Laterality Date     Surgical history of -   1930     cyst removal from lower lip     C appendectomy  1933     Surgical history of -   1950?     cyst removal, rt shoulder     Tonsillectomy  1958     Rotator cuff repair rt/lt  1998     Surgical history of  -        excess bone removed from L foot     Surgical history of -        repair of foot arch, Lt     Cataract iol, rt/lt       Rt eye     C thyroid ablation                 Social/Spiritual History:     [place], daughters involved.  Worked in real estate and clerical.            Family History:   Both parents            Allergies:   No Known Allergies           Medications:   I have reviewed this patient's medication profile and medications during this hospitalization           Review of Systems:   The comprehensive review of systems is negative other than noted here and in the HPI.  + only for weakness, generalized MSK (mild) pain  Remainder complete palliative ROS negative     Physical Exam:  VITALS: Blood pressure 161/77, pulse 88, temperature 97.6  F (36.4  C), temperature source Axillary, resp. rate 16, SpO2 96 %, not currently breastfeeding.  GEN: Awake, alert, interactive; slurred speech  EYES: Sclera non-icteric  EARS: Eumorphic bilaterally  NOSE: No significant nasal drainage  MOUTH: Oral mucosa moist, no evidence of thrush  LUNGS: No increased WOB or accessory muscle use  CV: Regular radial pulse 90  EXTR: 1+LE edema  SKIN: Warm, dry  NEUROPSYCH: Engaged, coherent thought process              Data Reviewed:     ROUTINE ICU LABS (Last four results)  CMP  Recent Labs  Lab 17  1406      POTASSIUM 4.1   CHLORIDE 107   CO2 26   ANIONGAP 7   *   BUN 20   CR 0.73   GFRESTIMATED 75   GFRESTBLACK >90African American GFR Calc   SAMIR 8.6   PROTTOTAL 6.6*   ALBUMIN 3.5   BILITOTAL 0.4   ALKPHOS 93   AST 18   ALT 15     CBC  Recent Labs  Lab 17  1406   WBC 7.9   RBC 4.01   HGB 12.0   HCT 34.9*   MCV 87   MCH 29.9   MCHC 34.4   RDW 12.9        INRNo lab results found in last 7 days.  Arterial Blood GasNo lab results found in last 7 days.               Consults by Shanta Wiggins APRN CNP at 2017  9:42 AM     Author:  Shanta Wiggins APRN CNP Service:   Neurology Author Type:  Nurse Practitioner    Filed:  1/24/2017 10:20 AM Note Time:  1/24/2017  9:42 AM Status:  Attested    :  Shanta Wiggins APRN CNP (Nurse Practitioner) Cosigner:  Wilver Perales MD at 1/24/2017  2:54 PM     Consult Orders:    1. Neurology IP Consult: Patient to be seen: Routine - within 24 hours; Acute confusion; Consultant may enter orders: Yes [[id number]] ordered by Dax Matos MD at 01/23/17 1616           Attestation signed by Wilver Perales MD at 1/24/2017  2:54 PM        Physician Attestation  I, Wilver Perales, saw and evaluated Enoc Taylor as part of a shared visit.  I have reviewed and discussed with the advanced practice provider their history, physical and plan.    I personally reviewed the vital signs, medications, labs and imaging.    My key history or physical exam findings: confused    Key management decisions made by me: MRI brain due to possible stroke    Wilver Perales  Date of Service (when I saw the patient): 01/24/2017                          [facility] [place]  [facility]    Neurology Consultation Note     Enoc Taylor MRN# [id number]   YOB: 1924 Age: 89 year old    Code Status:DNR/DNI   Date of Admission: 1/23/2017  Date of Consult: 01/24/2017    _________________________________   Primary Care Physician  Juan C Cage  ______________________________________________         Assessment and Plan  ______________________________________________  #. (R41.0) Delirium  (primary encounter diagnosis)  --CT negative  --history of similar spell with overcorrection of thyroid and alcohol  --see below  --will get MRI brain for further evaluation - overcorrection of thyroid can cause atrial fibrillation and increase risk of strokes  ---suspect her delirium will clear with correction of thyroid and treatment of UTI  #. (E03.9) Acquired hypothyroidism  --appears to be overcorrected again  --TSH  <0.01  --management per hospitalist  #. (N39.0) Urinary tract infection without hematuria, site unspecified  --UA positive for UTI  ----will also contribute to delirium in patient  ----defer management to hospitalist  #. (R44.3) Hallucinations  --intermittent for months, seeing her sister in the room in her bed  ----likely has some degree of dementia at baseline  ----increasing memory trouble  --further evaluation as outpatient  #. DVT Prophylaxis  --defer to primary service  #. PT/OT/Speech  --continue evaluations  #. Nutrition / GI Prophylaxis  --Per recommendations of speech therapy      #. Code Status: DNR / DNI    Reason for consult: I was asked by Dr. Matos to evaluate this patient for confusion.      Chief Complaint  ______________________________________________  Confusion and hallucinations   History is obtained from the electronic health record    History of Present Illness  ______________________________________________  Enoc Taylor is a 89 year old female who presented with confusion and hallucinations. History of mild to moderate memory impairment, lives in senior living facility. Went to the grocery store and appeared confused trying to pay for her goods. Appeared to be disoriented, usteady, and unable to complete speech upon return to her facility. In her room, she was having visual hallucinations of a calendar on her window, and per the daughter she had been having hallucinations about her sister who lives in [facility]. Had a similar episode in March 2016, which was due to overtreatment with levothyroxine and alcohol intoxication.     Daughter is at the bedside and notes she has some concerns about the patient still living alone. Few more memory issues, but overall feels her mother is pretty sharp for her age. Patient has confusion on memory testing but no focal physical deficits.      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 Rios in 5/08, pap with ascus and positive HPV (58 & 81)- pt declines colpo,      Past Surgical History  ______________________________________________  Past Surgical History   Procedure Laterality Date     Surgical history of -   1930     cyst removal from lower lip     C appendectomy  1933     Surgical history of -   1950?     cyst removal, rt shoulder     Tonsillectomy  1958     Rotator cuff repair rt/lt  1998     Surgical history of -   2007     excess bone removed from L foot     Surgical history of -   1986     repair of foot arch, Lt     Cataract iol, rt/lt  2007     Rt eye     C thyroid ablation  1998     Prior to Admission Medications  ______________________________________________  Prior to Admission Medications   Prescriptions Last Dose Informant Patient Reported? Taking?   Cyanocobalamin (B-12) 1000 MCG TBCR Past Week at Unknown time  No Yes   Sig: Take 1,000 mcg by mouth daily   IBUPROFEN PO prn  Yes Yes   Sig: Take 200 mg by mouth every 8 hours as needed for moderate pain   levothyroxine (SYNTHROID/LEVOTHROID) 75 MCG tablet 1/23/2017 at am  No Yes   Sig: Take 1 tablet (75 mcg) by mouth daily      Facility-Administered Medications: None     Allergies  No Known Allergies    Social History  ______________________________________________  Social History     Social History     Marital Status:      Spouse Name: N/A     Number of Children: 3     Years of Education: N/A     Social History Main Topics     Smoking status: Never Smoker      Smokeless tobacco: Never Used     Alcohol Use: Yes      Comment: 2 drinks per week if that     Drug Use: No      Sexual Activity: No      Comment: .  Single and happy to be so.     Other Topics Concern     None     Social History Narrative    Social Documentation:        Balanced Diet: NO, could get more fruits and vegies.      Calcium intake: more than 2 per day- a lot of milk, skim    Caffeine: 0 per day    Exercise:  type of activity walk;  daily times per week    Sunscreen: No    Seatbelts:  Yes    Self Breast Exam:  Yes    Self Testicular Exam: No    Physical/Emotional/Sexual Abuse: No    Do you feel safe in your environment? Yes        Cholesterol screen up to date: No-more than 5 yrs ago, borderline, not fasting today.    Eye Exam up to date: Yes    Dental Exam up to date: Yes    Pap smear up to date: Last pap in , abnl.  Last sexually active in .  Abnl paps and bx's.    Mammogram up to date: No: , give referral today.    Dexa Scan up to date: No: 5-8 yrs ago.    Colonoscopy up to date: No: never done. Pt refuses.    Immunizations up to date: Yes- td    Glucose screen if over 40:  No-pt not fasting       Family History  ______________________________________________  Family History   Problem Relation Age of Onset     Unknown/Adopted Mother      mother  at 100        Review of Systems  ______________________________________________  Review of systems is limited by patient factors - confusion      Physical Exam  ______________________________________________  Weight:0 lbs 0 oz; Height:Data Unavailable  Temp: 97.5  F (36.4  C) Temp src: Oral BP: 150/86 mmHg Pulse: 83   Resp: 16 SpO2: 96 % O2 Device: None (Room air)    General Appearance:  No acute distress  Neuro:       Mental Status Exam:   Awake, alert, oriented X1. Speech and language are intact. Mental status is confused       Cranial Nerves:  Pupils 3 mm, reactive. EOMI. Face sensation is normal. Face is symmetric. Tongue and uvula are midline. Other CN are normal           Motor:  5/5 X 4. Tone and bulk are normal           Reflexes:   Normal DTR. Toes downgoing.        Sensory:  Normal to light touch                   Coordination:   Intact finger-to-nose        Gait:  Up with assistance  Neck: no nuchal rigidity, normal thyroid. No carotid bruits.    Cardiovascular: Regular rate and rhythm, no m/r/g  Lungs: Clear to auscultation  Abdomen: Soft, not tender, not distended  Extremities: No clubbing, no cyanosis, no edema    Data  ______________________________________________  All Data personally reviewed:       Labs:   CBC RESULTS:     Recent Labs  Lab 01/23/17  1406   WBC 7.9   RBC 4.01   HGB 12.0   HCT 34.9*        Basic Metabolic Panel:   Recent Labs   Lab Test  01/23/17   1406  05/02/16   1118  06/22/15   1053   NA  140  141  142   POTASSIUM  4.1  4.2  4.4   CHLORIDE  107  105  105   CO2  26  25  29   BUN  20  16  13   CR  0.73  0.80  0.80   GLC  119*  115*  82   SAMIR  8.6  9.5  9.2     Liver panel:  Recent Labs   Lab Test  01/23/17   1406  05/02/16   1118  06/22/15   1053  12/18/14   1517  04/21/10   0758   PROTTOTAL  6.6*  7.1  6.7*  7.7   --    ALBUMIN  3.5  4.0  4.1  4.5   --    BILITOTAL  0.4  0.3  0.7  0.4   --    ALKPHOS  93  94  72  89   --    AST  18  21  20  25   --    ALT  15  18  16  18  18     Lipid Profile:  Recent Labs   Lab Test  06/22/15   1053  04/21/10   0758   CHOL  210*  195   HDL  55  47*   LDL  140*  122   TRIG  73  128   CHOLHDLRATIO  3.8  4.1     Thyroid Panel:  Recent Labs   Lab Test  01/23/17   1406  01/17/17   1340  05/02/16   1118  04/15/16   1047  06/22/15   1053   08/18/10   1510   01/12/10   1128  10/30/09   0923   TSH  <0.01*  <0.01*  <0.02  Reviewed: OK with previous  *  <0.01*  24.67*   < >  0.17*   < >  8.56*  7.55*   T4  1.52*  1.72*   --    --    --    --   1.32   --   1.08  0.91    < > = values in this interval not displayed.      Vitamin B12:   Recent Labs   Lab Test  05/02/16   1118  06/22/15   1053   B12  363  386      Troponin I:   Recent Labs   Lab Test  01/24/17   0805  01/23/17   2152   01/23/17   1755  01/23/17   1406   TROPI  0.190*  0.340*  0.308*  0.269*     UA Results:  Recent Labs   Lab Test  01/24/17   0750  01/23/17   1608   COLOR  Light Yellow  Yellow   APPEARANCE  Slightly Cloudy  Clear   URINEGLC  Negative  Negative   URINEBILI  Negative  Negative   URINEKETONE  Negative  Negative   SG  1.009  1.010   UBLD  Negative  Negative   URINEPH  6.5  7.0   PROTEIN  Negative  Negative   UROBILINOGEN   --   0.2   NITRITE  Negative  Negative   LEUKEST  Large*  Moderate*   RBCU  3*  O - 2   WBCU  >182*  5-10*     Most Recent 6 Bacteria Isolates From Any Culture (See EPIC Reports for Culture Details):  Recent Labs   Lab Test  05/02/16   1154  12/18/14   1617   CULT  >100,000 colonies/mL mixed urogenital natacha  Susceptibility testing not routinely done    10,000 to 50,000 colonies/mL mixed urogenital natacha          Imaging:   All imaging studies were reviewed personally  CT head 1/23/17:   --Diffuse cerebral volume loss and cerebral white matter changes consistent with chronic small vessel ischemic disease. No evidence for acute intracranial pathology.        Shanta Wiggins, [facility]                  Progress Notes - Physician (Notes from 01/25/17 through 01/28/17)      Progress Notes by Hailey Khanna at 1/28/2017  2:52 PM     Author:  Hailey Khanna Service:  Social Work Author Type:      Filed:  1/28/2017  2:53 PM Note Time:  1/28/2017  2:52 PM Status:  Signed    :  Hailey Khanna ()           Social Work Progress Note     D:   CARMELO faxed orders and PAS to [facility] at 1450. CARMELO updated Tea at [facility] that pt will arrive after 1600; Liseth confirmed this was fine.     P: Pt will d/c via family at 1600. No other CTS needs.       GEORGE [facility]  [phone]           Progress Notes by Hailey Khanna at 1/28/2017 11:59 AM     Author:  Hailey Khanna Service:  Social Work Author Type:      Filed:  1/28/2017 12:14 PM Note Time:  1/28/2017 11:59 AM Status:   Addendum    :  Hailey Khanna ()      Related Notes: Original Note by Hailey Khanna () filed at 1/28/2017 12:05 PM         Social Work Progress Note     D:   Pt discussed in interdisciplinary rounds; per rounds, pt is ready for discharge today. Referral was sent to [facility] TCU.     A/I: CARMELO spoke to Tea in admissions at [facility]; she confirms they have a bed available for patient today. Tea asks pt come after 1530 as they have shift change from [id number].    SW updated family and pt; they are agreeable to discharge to TCU today. They would like to transport patient. SW and family set tentative discharge time of 1600.    CC text paged MD to update discharge time and plan. SW updated bedside RN, charge RN and HUC.  SW completed PAS.       P: SW will fax orders and PAS when completed.       GEORGE [facility]  *[phone]      PAS-RR    D: Per DHS regulation, SW completed and submitted PAS-RR to [place] [employer]ging Direct Connect via the Senior LinkAge Line.      PAS-RR confirmation # is : [id number]    I: SW spoke with patient and they are aware a PAS-RR has been submitted.  SW reviewed with patient that they may be contacted for a follow up appointment within 10 days of hospital discharge if their SNF stay is < 30 days.  Contact information for Senior [employer] Line was also provided.    A: Patient verbalized understanding.    P: Further questions may be directed to Senior LinkAge Line at #[phone], option #4 for PAS-RR staff.      GEORGE [facility]  Ph: [phone]           Progress Notes by Quynh Escalera RN at 1/28/2017 11:52 AM     Author:  Quynh Escalera RN Service:  Care Coordinator Author Type:      Filed:  1/28/2017 11:54 AM Note Time:  1/28/2017 11:52 AM Status:  Signed    :  Quynh Escalera RN ()           MD Notification    Notified Person:  MD    Notified Persons Name: Lauren,     Notification Date/Time: 1/28/17 11:53  a.m.    Notification Interaction:  Talked with Physician    Purpose of Notification: Per SW patient has a bed a [facility] today.  Asking for d/c to TCU with PT/OT orders.     Orders Received:    Comments:         Progress Notes by Shavonne Puri OT at 1/28/2017  8:12 AM     Author:  Shavonne Puri OT Service:  (none) Author Type:  Occupational Therapist    Filed:  1/28/2017  8:12 AM Note Time:  1/28/2017  8:12 AM Status:  Signed    :  Shavonne Puri OT (Occupational Therapist)            01/28/17 0736   Quick Adds   Type of Visit Initial Occupational Therapy Evaluation   Living Environment   Lives With alone   Living Arrangements apartment;independent living facility   Home Accessibility no concerns   Number of Stairs to Enter Home 0   Number of Stairs Within Home 0   Transportation Available family or friend will provide   Living Environment Comment Pt lives alone in an independent living apartment. Pt's daughter reports she will be unable to stay with pt. Pt's daughter reports pt uses a 4WW mainly to carry things on the seat and out in the community in case she needs to sit.  Pt inaccurate historian, reports independence in all ADLs, unsure of accuracy.   Self-Care   Dominant Hand right   Usual Activity Tolerance good   Current Activity Tolerance moderate   Regular Exercise yes   Activity/Exercise Type walking   Exercise Amount/Frequency daily   Equipment Currently Used at Home walker, rolling  (4WW)   Functional Level Prior   Ambulation 0-->independent  (4WW out in community in case she needs to sit)   Transferring 0-->independent   Toileting 0-->independent   Bathing 0-->independent   Dressing 0-->independent   Eating 0-->independent   Communication 0-->understands/communicates without difficulty   Swallowing 0-->swallows foods/liquids without difficulty   Cognition 1 - attention or memory deficits   Fall history within last six months yes   Number of times patient has fallen within last six  "months 1   Which of the above functional risks had a recent onset or change? transferring;toileting;bathing;dressing;cognition   Prior Functional Level Comment Per chart, pt utilizes 4WW in community only if needs to sit, pt reports independence in ADLs.   General Information   Onset of Illness/Injury or Date of Surgery - Date 01/23/17   Referring Physician Tomy Aguilar MD   Patient/Family Goals Statement Family's goal is to d/c to TCU   Additional Occupational Profile Info/Pertinent History of Current Problem Ms. Enoc Kahn is a 89-year-old  lady with a past medical history notable for mild to moderate memory loss, hypothyroidism and recent history of hallucinations who has presented with acute confusion; acute CVA and New-onset systolic cardiomyopathy likely due to recent NSTEMI   Precautions/Limitations fall precautions   Cognitive Status Examination   Orientation person   Level of Consciousness alert;confused   Able to Follow Commands mild impairment   Personal Safety (Cognitive) moderate impairment;decreased awareness, need for safety;decreased insight to deficits   Memory impaired   Cognitive Comment Pt perseverating on \"Priyanka Cook\" believed she knew OT's family. Required frequent re-direction t/o session.    Visual Perception   Visual Perception Wears glasses   Sensory Examination   Sensory Comments Pt denies numbness/tingling in BUEs   Pain Assessment   Patient Currently in Pain Yes, see Vital Sign flowsheet  (Unable to rate, occasional complaints of pain in L ankle/nomi)   Range of Motion (ROM)   ROM Quick Adds No deficits were identified   Strength   Strength Comments BUE MMT: 4-/5   Hand Strength   Hand Strength Comments WFL gross grasp B   Coordination   Upper Extremity Coordination No deficits were identified   Bed Mobility Skill: Sit to Supine   Level of [place]: Sit/Supine stand-by assist   Physical Assist/Nonphysical Assist: Sit/Supine 1 person assist   Bed Mobility Skill: " Supine to Sit   Level of [place]: Supine/Sit stand-by assist   Physical Assist/Nonphysical Assist: Supine/Sit 1 person assist   Transfer Skill: Bed to Chair/Chair to Bed   Level of [place]: Bed to Chair contact guard   Physical Assist/Nonphysical Assist: Bed to Chair 1 person assist   Weight-Bearing Restrictions weight-bearing as tolerated   Assistive Device - Transfer Skill Bed to Chair Chair to Bed Rehab Eval standard walker   Transfer Skill: Sit to Stand   Level of [place]: Sit/Stand contact guard   Physical Assist/Nonphysical Assist: Sit/Stand 1 person assist   Transfer Skill: Sit to Stand weight-bearing as tolerated   Assistive Device for Transfer: Sit/Stand standard walker   Transfer Skill: Toilet Transfer   Level of [place]: Toilet contact guard   Physical Assist/Nonphysical Assist: Toilet 1 person assist   Weight-Bearing Restrictions: Toilet weight-bearing as tolerated   Assistive Device standard walker   Balance   Balance Comments SBA seated EOB, CGA/min A while in stance FWW level   Upper Body Dressing   Level of [place]: Dress Upper Body minimum assist (75% patients effort)   Physical Assist/Nonphysical Assist: Dress Upper Body 1 person assist   Lower Body Dressing   Level of [place]: Dress Lower Body minimum assist (75% patients effort)   Physical Assist/Nonphysical Assist: Dress Lower Body 1 person assist   Toileting   Level of [place]: Toilet minimum assist (75% patients effort)   Physical Assist/Nonphysical Assist: Toilet 1 person assist   Grooming   Level of [place]: Grooming minimum assist (75% patients effort)   Physical Assist/Nonphysical Assist: Grooming 1 person assist   Instrumental Activities of Daily Living (IADL)   IADL Comments Unsure pt's level of IADL independence due to pt being a poor historian   Activities of Daily Living Analysis   Impairments Contributing to Impaired Activities of Daily Living balance impaired;cognition impaired;pain;strength  "decreased   ADL Comments Pt presents to OT below baseline level of functioning in all areas of self cares including bathing, dressing, grooming, and toileting   General Therapy Interventions   Planned Therapy Interventions ADL retraining;IADL retraining;strengthening;transfer training   Clinical Impression   Criteria for Skilled Therapeutic Interventions Met yes, treatment indicated   OT Diagnosis Impaired ADLs, IADLs and functional mobility tasks   Influenced by the following impairments Decreased strength and functional activity tolerance, impaired balance, impaired cognition and safety   Assessment of Occupational Performance 3-5 Performance Deficits   Identified Performance Deficits Dressing, toileting, bathing, transfers   Clinical Decision Making (Complexity) Low complexity   Therapy Frequency 5 times/wk   Predicted Duration of Therapy Intervention (days/wks) 3 days   Anticipated Discharge Disposition Transitional Care Facility   Risks and Benefits of Treatment have been explained. Yes   Patient, Family & other staff in agreement with plan of care Yes   Clinical Impression Comments Pt would benefit from skilled OT to maximize safety and independence in all ADLs, IADLs and functional mobility tasks due to current deficits impacting function   [facility] AM-PAC  \"6 Clicks\" Daily Activity Inpatient Short Form   1. Putting on and taking off regular lower body clothing? 3 - A Little   2. Bathing (including washing, rinsing, drying)? 3 - A Little   3. Toileting, which includes using toilet, bedpan or urinal? 3 - A Little   4. Putting on and taking off regular upper body clothing? 3 - A Little   5. Taking care of personal grooming such as brushing teeth? 3 - A Little   6. Eating meals? 4 - None   Daily Activity Raw Score (Score out of 24.Lower scores equate to lower levels of function) 19   Total Evaluation Time   Total Evaluation Time (Minutes) 10          Progress Notes by Marge Wallace LSW at 1/27/2017 " 11:13 AM     Author:  Marge Wallace LSW Service:  (none) Author Type:      Filed:  1/27/2017  2:35 PM Note Time:  1/27/2017 11:13 AM Status:  Addendum    :  Marge Wallace LSW ()      Related Notes: Original Note by Marge Wallace LSW () filed at 1/27/2017 11:58 AM         CARMELO  I: SW met with dtr and pt.  Pt's dtr upset as she feels she was not given adequate information about the discharge process.  SW listened to concerns and addressed options of LTC w/ hospice vs. [facility] w/ hospice/ and 24 hour caregiver, in pt's own apartment.  Dtr also concerned because pt will run out of money in the next couple months, and need to apply for MA.  SW informed process of applying for MA will be different when in LTC vs Community.  Pt's dtr does not want [facility] LTC as she has never toured and wants to discuss goals of care with her brother who is flying into [facility].  Dtr is meeting with a private pay person from [facility] today at 11:30am, as dtr feels she needs extra guidance in determining options.  SW explained to dtr that if pt returns to [facility], that dtr may want to place pt's name on LTC lists, as pt may need to transition to this.   A: Pt wants to return to [facility] with assistance needed, pt undecided about Hospice d/t what medications she may want to take to improve cardiac function.  Family to meet this evening/tomorrow, when pt's son arrives and determine plans and goals.  Pt's dtr agreeable to have SW send notes updated to [facility], as pt has improved since yesterday.    P: SW to follow.     I: added pt's gr. dtr to facesheet, as other relative is in [place] for a few weeks, and not reachable.  Left message for RN at [facility].  Updated [facility] that pt is undetermined about d/c plan, Noah from [facility] will hold onto referral until Monday.  Dtr updated.      I: Spoke with dtr and it was decided pt would like to go to [facility]  TCU first to attempt therapies, and will start new medications.  Pt would then like to transfer to LTC, and possibly hospice after tcu stay.         Progress Notes by Tomy Aguilar MD at 1/27/2017  8:00 AM     Author:  Tomy Aguilar MD Service:  Hospitalist Author Type:  Physician    Filed:  1/27/2017  1:42 PM Note Time:  1/27/2017  8:00 AM Status:  Addendum    :  Tmoy Aguilar MD (Physician)      Related Notes: Original Note by Tomy Aguilar MD (Physician) filed at 1/27/2017  8:09 AM         [facility]    Hospitalist Progress Note    Date of Service (when I saw the patient): 01/27/2017    Assessment and Plan   Ms. Enoc Kahn is a 89-year-old  lady with a past medical history notable for mild to moderate memory loss, hypothyroidism and recent history of hallucinations who has presented with acute confusion.      1. Acute ischemic CVA:  -patient initially admitted for a delirium  -MRI showed acute infarct in the lateral inferior right frontal lobe and anterior right insula and the right middle cerebral artery territory  -had a discussion with the patient and her daughter, POA, 1/25, initially,they both wanted pt to comfort care only  -Pt improved in the last 48 hours, plans to reverse comfort measures now (1/27); but will continue palliative/hospice care  -Appreciate palliative care consult  -family would still prefer to DC to a facility with hospice    2. New-onset systolic cardiomyopathy likely due to recent NSTEMI:  -patient had troponin done in the emergency room which was elevated with peak at 0.3  -patient does have ischemic changes on her EKG on anterolateral lead  -echocardiogram done showed an EF of 28%with moderate to severe global hypokinesia of the left ventricle with septal akinesis and severe inferior wall hypokinesis  -Likely the patient sustained an ischemic event within the last few days; she however denies any cardiac symptoms like chest pain or  dyspnea  -Pt and daughter do not want any Rx for cardiomyopathy- not even medical    Addendum: Family now want cadriomyopathy medication started.  Will start on aspirin 81 mg daily, Coreg 3.125 mg b.i.d., lisinopril 5 mg daily.    3.  Acute delirium:    -this was her initial presentation  -likely multifactorial due to cardiac event/UTI/CVA/overcorrected hypothyroidism  -Much improved    4. Hypothyroidism; overcorrected:   -The patient has a history of hyperthyroidism treated with thyroid ablation several years ago with subsequent development of acquired hypothyroidism.    -Her recent TSH was less than 0.01 on 01/17/2017 and the dose of levothyroxine was reduced to 75 mcg p.o. daily by her primary care provider.    -At presentation TSH is still less than 0.01  -resume levothyroxine in 2-3 days at 50 mcg    5. Mild to moderate memory impairment:    -Patient apparently has mild to moderate memory impairment at baseline  -apparently getting worse in the recent past    6.  Hypertension:    -The blood pressure in the Emergency Room was elevated at 180/48.    -She has no previous history of hypertension.    -Monitor for now    6. UTI  -UA suggestive of UTI  -Completed 3 days of rocephin      D/W: RN  DVT Prophylaxis: Pneumatic Compression Devices  Code Status: DNR/DNI    Disposition: Expected discharge pending placement    Tomy Aguilar MD    Interval History  No acute events per nursing report. Pt much more lucid. Denies CP or dyspnea    -Data reviewed today: I reviewed all new labs and imaging results over the last 24 hours. I personally reviewed the EKG tracing showing nsr.    Physical Exam            Resp: 20        There were no vitals filed for this visit.  Vital Signs with Ranges  Resp:  [16-20] 20  I/O last 3 completed shifts:  In: 240 [P.O.:240]  Out: -     Constitutional: AAOX2, NAD, answers simple questions very appropriately  Respiratory:  No crackles, No wheezes, Normal WOB  Cardiovascular: RRR, No  murmur  GI: Soft, Non- tender, BS- normoactive, No Guarding/rebound/rigidity  Skin/Integument: Warm and dry, no rashes  MSK: No joint deformity or swelling, no edema  Neuro: Speech better, moves all four appropriately     Medications          Data    Recent Labs  Lab 01/24/17  0805 01/23/17  2155 01/23/17  1755 01/23/17  1406   WBC  --   --   --  7.9   HGB  --   --   --  12.0   MCV  --   --   --  87   PLT  --   --   --  310   NA  --   --   --  140   POTASSIUM  --   --   --  4.1   CHLORIDE  --   --   --  107   CO2  --   --   --  26   BUN  --   --   --  20   CR  --   --   --  0.73   ANIONGAP  --   --   --  7   SAMIR  --   --   --  8.6   GLC  --   --   --  119*   ALBUMIN  --   --   --  3.5   PROTTOTAL  --   --   --  6.6*   BILITOTAL  --   --   --  0.4   ALKPHOS  --   --   --  93   ALT  --   --   --  15   AST  --   --   --  18   TROPI 0.190* 0.340* 0.308* 0.269*       No results found for this or any previous visit (from the past 24 hour(s)). will       Progress Notes by Kelly Umaña LSW at 1/26/2017 10:15 AM     Author:  Kelly Umaña LSW Service:  (none) Author Type:      Filed:  1/26/2017  4:37 PM Note Time:  1/26/2017 10:15 AM Status:  Addendum    :  Kelly Umaña LSW ()      Related Notes: Original Note by Kelly Umaña LSW () filed at 1/26/2017  2:02 PM         Care Transition Initial Assessment -   Reason For Consult: end of life/hospice  Met with: PATIENT,FAMILY   Active Problems:    Delirium    General weakness         DATA  Lives With: alone  Living Arrangements: apartment, independent living facility  Description of Support System: Supportive, Involved  Who is your support system?: Children, Other (specify) (granddaughter)  Support Assessment: Lacks necessary supervision and assistance, Caregiver difficulty providing physical care/safety.   Identified issues/concerns regarding health management:patient/family considering hospice at  d/c  Patient feels that they have adequate support @ home?  YES  Resources List: Hospice  Quality Of Family Relationships: supportive, involved, helpful  Transportation Available: family or friend will provide    ASSESSMENT  Cognitive Status: Oriented to self  Concerns to be addressed: patient is a 89 year old female who was admitted to the hospital for delirium. Prior to hospitalization patient was living at [facility]. Family is aware that patient would not be able to go back to [facility] with hospice due to lack of assistance. Patient's family would like patient to go to LTC bed vs hospice home. Patient's family would like a hospice consult with  Hospice. Jane from  Hospice is available this morning and will meet with patient, daughter and granddaughter. SW will meet [facility] family and patient after consult. Patient's family is aware that LTC/Hospice home is private pay.      PLAN  Patient Goals and Preferences: D/C with  hospice  Patient anticipates discharging to:  D/C with  hospice    CATARINA Pal   *[phone]      ADDENDUM  I: CARMELO was updated that patient would like to go to [facility] with hospice. SW will need to check if this is a feasible option. Patient is also a . SW will check to see if patient hs VA benefits for VA facility with hospice at d/c. CARMELO called and spoke with [facility] staff. Staff stated that she does not currently have benefits but could apply for them by filling out [id number] form and submitting  (discharge orders form ) to [phone]. Patient may have to private pay for a short time at a VA contracted facility on hospice until benefits are active. Patient will also like SW to look into LTC beds if [facility] is not an option. CARMELO called [facility]. Patient is currently in [facility], [facility] ([phone]) stated they could increase services but if patient needs assist with transfers they would need to move patient down to  [facility]. [facility] stated they can accept patient back on hospice and are ok with Elderly waiver when patient is ready to transition to [facility]. SW updated family about all options. SW awaits a decision from family.    ADDENDUM  I: Family is going to to look at [facility] and will confirm with SW if this is the route they would like to take. SW awaits confirmation.      ADDENDUM  I: SW spoke with patient's family and they stated patient cannot go to [facility] and would like a LTC bed. SW provided them an anticipated bed availability list for LTC facilities. Patient's granddaughter is reviewing with family and will give SW choices.    Patient's family would like a referral sent to [facility] for LTC w/ Hospice. CARMELO faxed referral via DOD.       Progress Notes by Hannah Lindo SLP at 1/26/2017  2:27 PM     Author:  Hannah Lindo SLP Service:  (none) Author Type:  Speech Language Pathologist    Filed:  1/26/2017  2:27 PM Note Time:  1/26/2017  2:27 PM Status:  Signed    :  Hannah Lindo SLP (Speech Language Pathologist)            01/26/17 1418   General Information   Onset Date 01/23/17   Start of Care Date 01/26/17   Referring Physician Tomy Aguilar MD   Patient Profile Review/OT: Additional Occupational Profile Info See Profile for full history and prior level of function   Patient/Family Goals Statement Pt would like to eat and drink   Swallowing Evaluation Bedside swallow evaluation   Behaviorial Observations Lethargic   Mode of current nutrition NPO   Respiratory Status Room air   Comments Ms. Enoc Kahn is a 89-year-old  lady with a past medical history notable for mild to moderate memory loss, hypothyroidism and recent history of hallucinations who has presented with acute confusion.  Pt is on comfort cares but family requesting ST eval to assess safety of swallow   Clinical Swallow Evaluation   Oral Musculature generally intact   Structural Abnormalities none  present   Dentition present and adequate   Mucosal Quality dry   Mandibular Strength and Mobility intact   Oral Labial Strength and Mobility impaired pursing   Lingual Strength and Mobility impaired anterior elevation   Velar Elevation intact   Buccal Strength and Mobility intact   Laryngeal Function Cough;Throat clear;Swallow;Voicing initiated   Oral Musculature Comments Left sided facial droop   Additional Documentation Yes   Clinical Swallow Eval: Thin Liquid Texture Trial   Mode of Presentation, Thin Liquids spoon;cup;self-fed;fed by clinician   Volume of Liquid or Food Presented 4 oz   Oral Phase of Swallow WFL   Pharyngeal Phase of Swallow intact   Diagnostic Statement Pt tolerated thin liquids trials with no overt s/sx of aspiration    Clinical Swallow Eval: Puree Solid Texture Trial   Mode of Presentation, Puree spoon;self-fed;fed by clinician   Volume of Puree Presented 3 tbsp   Oral Phase, Puree WFL   Pharyngeal Phase, Puree intact   Diagnostic Statement Pt tolerated pureed textures with no overt s/sx of aspiraiton    Clinical Swallow Eval: Semisolid Texture Trial   Mode of Presentation, Semisolid spoon;fed by clinician   Volume of Semisolid Food Presented 2 tbsp   Oral Phase, Semisolid other (see comments)  (mildly prolonged time for mastication)   Pharyngeal Phase, Semisolid intact   Diagnostic Statement Pt require mlidly prolonged time for mastication and no overt s/sx of aspiration   Clinical Swallow Eval: Solid Food Texture Trial   Mode of Presentation, Solid self-fed   Volume of Solid Food Presented 1/2 cracker   Oral Phase, Solid other (see comments)  (prolonged time for mastication)   Pharyngeal Phase, Solid intact   Diagnostic Statement Pt required proloned but functional time for mastication and no overt s/sx of aspiration    VFSS Evaluation   VFSS Additional Documentation No   FEES Evaluation   Additional Documentation No   Swallow Compensations   Swallow Compensations Alternate viscosity of  consistencies;Pacing;Multiple swallow;Reduce amounts   Results No difficulties noted   Esophageal Phase of Swallow   Patient reports or presents with symptoms of esophageal dysphagia No   General Therapy Interventions   Planned Therapy Interventions Dysphagia Treatment   Dysphagia treatment Instruction of safe swallow strategies;Compensatory strategies for swallowing   Intervention Comments 1 tx session provided following eval   Swallow Eval: Clinical Impressions   Skilled Criteria for Therapy Intervention Skilled criteria met.  Treatment indicated.   Functional Assessment Scale (FAS) 6   Treatment Diagnosis Minimal oropharyngeal dysphagia   Diet texture recommendations Regular diet;Thin liquids   Recommended Feeding/Eating Techniques alternate between small bites and sips of food/liquid;check mouth frequently for oral residue/pocketing;hard swallow w/ each bite or sip;maintain upright posture during/after eating for 30 mins;small sips/bites   Therapy Frequency other (see comments)  (1 tx session following eval)   Predicted Duration of Therapy Intervention (days/wks) No further ST needs indicated beyond tx session following eval   Anticipated Discharge Disposition (hospice)   Risks and Benefits of Treatment have been explained. Yes   Patient, family and/or staff in agreement with Plan of Care Yes   Clinical Impression Comments SLP: Bedside swallow eval completed per MD orders. Pt presents with minimal oropharyngeal dysphagia s/p CVA. Pt on comfort cares but pts family requesting eval to assess safety of swallow. Pt with mild left facial droop. Pt tolerated all PO trials with no overt s/sx of aspiration. Pt required mildly prolonged time in oral phase however no s/sx of aspiration noted across trials. Recommend regular textures and thin liquids. Pt should be fully alert and upright for all PO, take small single sips/bites, alternate consistencies, and pace self. Pills to be served in small amount of puree. Educated  provided to pt and family on strategies to increase safety of swallow as pt is expected to d/c on hospice in next 1-2 days. No further ST needs indicated. Will complete ST orders.    Total Evaluation Time   Total Evaluation Time (Minutes) 10          Progress Notes by Scheierl, Amber, RN at 1/26/2017  2:17 PM     Author:  Scheierl, Amber, RN Service:  (none) Author Type:  Registered Nurse    Filed:  1/26/2017  2:17 PM Note Time:  1/26/2017  2:17 PM Status:  Signed    :  Scheierl, Amber, RN (Registered Nurse)           Per speech pt ok to have regular diet and thin liquids. Will enter order.       Progress Notes by Tomy Aguilar MD at 1/26/2017  9:54 AM     Author:  Tomy Aguilar MD Service:  Hospitalist Author Type:  Physician    Filed:  1/26/2017 11:50 AM Note Time:  1/26/2017  9:54 AM Status:  Signed    :  Tomy Aguilar MD (Physician)           [facility]    Hospitalist Progress Note    Date of Service (when I saw the patient): 01/26/2017    Assessment and Plan   Ms. Enoc Kahn is a 89-year-old  lady with a past medical history notable for mild to moderate memory loss, hypothyroidism and recent history of hallucinations who has presented with acute confusion.      1. Acute ischemic CVA:  -patient initially admitted for a delirium  -MRI showed acute infarct in the lateral inferior right frontal lobe and anterior right insula and the right middle cerebral artery territory  -had a discussion with the patient and her daughter, POA, 1/25, they both concurred that the patient wants to be comfort care only  -Appreciate palliative care consult  - consult for hospice    2. New-onset systolic cardiomyopathy likely due to recent NSTEMI:  -patient had troponin done in the emergency room which was elevated with peak at 0.3  -patient does have ischemic changes on her EKG on anterolateral lead  -echocardiogram done showed an EF of 28%with moderate to severe  global hypokinesia of the left ventricle with septal akinesis and severe inferior wall hypokinesis  -Likely the patient sustained an ischemic event within the last few days; she however denies any cardiac symptoms like chest pain or dyspnea  -Again had a discussion with the daughter and the patient; she does not want anything aggressive or invasive like coronary angiogram or PCI or even medical management  -comfort cares as above    3.  Acute delirium:    -this was her initial presentation  -likely multifactorial due to cardiac event/UTI/CVA/overcorrected hypothyroidism    4. Hypothyroidism; overcorrected:   -The patient has a history of hyperthyroidism treated with thyroid ablation several years ago with subsequent development of acquired hypothyroidism.    -Her recent TSH was less than 0.01 on 01/17/2017 and the dose of levothyroxine was reduced to 75 mcg p.o. daily by her primary care provider.    -At presentation TSH is still less than 0.01  -levothyroxine discontinued    5. Mild to moderate memory impairment:    -Patient apparently has mild to moderate memory impairment at baseline  -apparently getting worse in the recent past    6.  Hypertension:    -The blood pressure in the Emergency Room was elevated at 180/48.    -She has no previous history of hypertension.      6. UTI  -UA suggestive of UTI  -Discontinue Rocephin after today's dose       D/W: RN  DVT Prophylaxis: Pneumatic Compression Devices  Code Status: DNR/DNI    Disposition: Expected discharge pending hospice evaluation    Tomy Aguilar MD    Interval History  No acute events per nursing report.  Patient denies new complaints other than bilateral knee pain    -Data reviewed today: I reviewed all new labs and imaging results over the last 24 hours. I personally reviewed the EKG tracing showing nsr.    Physical Exam  Temp: 97.9  F (36.6  C) Temp src: Oral BP: 165/79 mmHg Pulse: 93   Resp: 16 SpO2: 97 % O2 Device: None (Room air)    There were no  vitals filed for this visit.  Vital Signs with Ranges  Temp:  [97.6  F (36.4  C)-97.9  F (36.6  C)] 97.9  F (36.6  C)  Pulse:  [88-93] 93  Resp:  [16] 16  BP: (161-165)/(77-79) 165/79 mmHg  SpO2:  [96 %-97 %] 97 %  I/O last 3 completed shifts:  In: 1730 [I.V.:1730]  Out: -     Constitutional: AAOX2, NAD, answers simple questions very appropriately  Respiratory:  No crackles, No wheezes, Normal WOB  Cardiovascular: RRR, No murmur  GI: Soft, Non- tender, BS- normoactive, No Guarding/rebound/rigidity  Skin/Integument: Warm and dry, no rashes  MSK: No joint deformity or swelling, no edema  Neuro: left facial droop, slurred speech, moves all four appropriately     Medications       cefTRIAXone  1 g Intravenous Q24H       Data    Recent Labs  Lab 01/24/17  0805 01/23/17  2155 01/23/17  1755 01/23/17  1406   WBC  --   --   --  7.9   HGB  --   --   --  12.0   MCV  --   --   --  87   PLT  --   --   --  310   NA  --   --   --  140   POTASSIUM  --   --   --  4.1   CHLORIDE  --   --   --  107   CO2  --   --   --  26   BUN  --   --   --  20   CR  --   --   --  0.73   ANIONGAP  --   --   --  7   SAMRI  --   --   --  8.6   GLC  --   --   --  119*   ALBUMIN  --   --   --  3.5   PROTTOTAL  --   --   --  6.6*   BILITOTAL  --   --   --  0.4   ALKPHOS  --   --   --  93   ALT  --   --   --  15   AST  --   --   --  18   TROPI 0.190* 0.340* 0.308* 0.269*       No results found for this or any previous visit (from the past 24 hour(s)). will       Progress Notes by Jane Kowalski RN at 1/26/2017 11:20 AM     Author:  Jane Kowalski, RN Service:  Hospice Author Type:  Registered Nurse    Filed:  1/26/2017 11:35 AM Note Time:  1/26/2017 11:20 AM Status:  Signed    :  Jane Kowalski RN (Registered Nurse)           [facility]: Met with pt, daughter Marquis and granddaughter Dhara to explain hospice services.Marquis tells me her mother has been at [facility] for a few months and before this lived independently in her own apt and had to  move to an [facility] after being diagnosed with toxic encephalopathy. She is currently hospitalized for elevated thyroid, uti and while hospitalized she developed significant left-sided facial droop, and drooling. Work up showed recent NSTEMI, and stroke. The ideal plan would be to go back to [facility] if they can acommodate her level of care and MA funding. The other options would be a skilled nursing facility that takes MA. Pt is also a /[place] and could maybe qualify for [facility] contracted nursing home with hospice care. I explained the medicare hospice benefit, services available for support, medication and equipment coverage. No hospice consent forms signed today. See Dr Minor's note for hospice meds recommendations at discharge. When a facility is determined for discharge I will meet with the daughter and pt again to sign consents and help with the discharge planning. Thank you for the referral. Jane Kowalski RN, BSN  [facility] Admission nurse  [phone]       Progress Notes by Kaci Hendrickson at 1/25/2017  2:01 PM     Author:  Kaci Hendrickson Service:  Palliative Author Type:      Filed:  1/26/2017 11:14 AM Note Time:  1/25/2017  2:01 PM Status:  Signed    :  Kaci Hendrickson ()           Palliative Care Inpatient Clinical Social Work Assessment    Patient Information:  Enoc Taylor is a 89 year old woman who was admitted for delirium on 1/23/17. While hospitalized she developed significant left-sided facial droop, and drooling. Work up showed recent NSTEMI, and stroke (see hospitalist note for full summary). Palliative Care Team consulted for support with goals of care conversations.     Relevant Symptoms/Concerns/Strengths     Physical:  No symptoms noted in today's visit. Enoc reported she was not in pain, and had no concerns about other symptoms such as anxiety or other symptoms   Psychological/Emotional/Existential:  Family appeared saddened, but  were also very open to discussing end of life goals of care.    Family/Social/Caregiver:   Enoc's daughter and granddaughter were at bedside and are involved and supportive.   Developmental:     Mental Health:     End of Life:  Enoc was very clear that her wish at this time is for comfort-focused cares at d/c. Family is hoping to meet with hospice for an informational visit tomorrow.    Cultural/Mu-ism/Spiritual:     Grief/Loss:     Concurrent Stressors:       Comments:            Comments:      Goals/Decision Making/Advance Care Planning   Preferences:  Comfort focused   Concerns:  Enoc was able to appropriately participate in conversation. She kept her eyes closed for much of our discussion, but often nodded to indicate agreement with discussion of comfort cares vs restorative / rehab -focused cares   Documents:  Family referenced a health care directive which clearly lays out Enoc's wishes. There is no copy in the chart at this time.    Decision Making Issues:  Enoc was able to participate appropriately with support from family in yeterday's discussion     Comments:      Resource Needs     Discharge Planning:  Per Unit/Program  and/or Care Coordinator   Other:     Comments:      Sources of Information   Patient:  x   Family:  x   Staff:  x   Chart Review:  x   Other:      Intervention (Check all that apply)    x   Assessment of palliative specific issues      x   Introduction of Palliative clinical social work interventions    x   Adjustment to illness counseling       Advanced care planning       Attended/participated in care conference       Behavioral interventions for symptom management    x   Facilitation of processing of thoughts/feelings    x   Family communication facilitated       Grief counseling    x   Goals of care discussion/facilitation       Life legacy work       Life review facilitation       Psychoeducation       Re-framing       Resource referral: Social Work/ Hospice        Other:       Comments:      Plan:  Will continue to be available as needed for support with goals of care conversations    GEORGE Rudd, [facility]   Palliative Care    Pgr:[phone]  Ph: [phone]               Progress Notes by Maryann Wilkins RN at 1/25/2017  4:14 PM     Author:  Maryann Wilkins RN Service:  (none) Author Type:  Registered Nurse    Filed:  1/25/2017  4:15 PM Note Time:  1/25/2017  4:14 PM Status:  Signed    :  Maryann Wilkins, RN (Registered Nurse)           Updated Dtr Kit on pt's planned transfer and room change, Sta 88, Rm 828.       Progress Notes by Sara Brito at 1/25/2017 10:37 AM     Author:  Sara Brito Service:  Spiritual Health Author Type:      Filed:  1/25/2017 10:38 AM Note Time:  1/25/2017 10:37 AM Status:  Signed    :  Sraa Brito ()           SPIRITUAL HEALTH SERVICES Progress Note  [facility]    Brief introductory visit with pt's daughter (I believe).  She declined offer to visit.  SH team is available if needs arise.                                                                                                                                                 Sara Brito  Staff   Pager [phone]           Progress Notes by Tomy Aguilar MD at 1/25/2017  9:03 AM     Author:  Tomy Aguilar MD Service:  Hospitalist Author Type:  Physician    Filed:  1/25/2017  9:32 AM Note Time:  1/25/2017  9:03 AM Status:  Signed    :  Tomy Aguilar MD (Physician)           [facility]    Hospitalist Progress Note    Date of Service (when I saw the patient): 01/25/2017    Assessment and Plan   Ms. Enoc Kahn is a 89-year-old  lady with a past medical history notable for mild to moderate memory loss, hypothyroidism and recent history of hallucinations who has presented with acute confusion.      1. Acute ischemic CVA:  -patient initially admitted for a  delirium  -yesterday afternoon developed significant left-sided facial droop, dysarthria and drooling so an MRI was done  -MRI showed acute infarct in the lateral inferior right frontal lobe and anterior right insula and the right middle cerebral artery territory  -started on rectal aspirin 300 mg daily as patient also has dysphagia  -had a discussion with the patient and her daughter, POA, at the bedside this morning, they both concurred that the patient wants to be comfort care only  -Requested palliative care consult    2. New-onset systolic cardiomyopathy likely due to recent NSTEMI:  -patient had troponin done in the emergency room which was elevated with peak at 0.3  -patient does have ischemic changes on her EKG on anterolateral lead  -echocardiogram done showed an EF of 28%with moderate to severe global hypokinesia of the left ventricle with septal akinesis and severe inferior wall hypokinesis  -Likely the patient sustained an ischemic event within the last few days; she however denies any cardiac symptoms like chest pain or dyspnea  -Again had a discussion with the daughter and the patient; she does not want anything aggressive or invasive like coronary angiogram or PCI or even medical management  -comfort cares as above    3.  Acute delirium:    -this was her initial presentation  -likely multifactorial due to cardiac event/UTI/CVA/overcorrected hypothyroidism    4. Hypothyroidism; overcorrected:   -The patient has a history of hyperthyroidism treated with thyroid ablation several years ago with subsequent development of acquired hypothyroidism.    -Her recent TSH was less than 0.01 on 01/17/2017 and the dose of levothyroxine was reduced to 75 mcg p.o. daily by her primary care provider.    -At presentation TSH is still less than 0.01  -levothyroxine discontinued    5. Mild to moderate memory impairment:    -Patient apparently has mild to moderate memory impairment at baseline  -apparently getting worse in  the recent past    6.  Hypertension:    -The blood pressure in the Emergency Room was elevated at 180/48.    -She has no previous history of hypertension.      6. UTI  -UA suggestive of UTI  -continue Rocephin 1 g IV daily, the daughter wants abx continued as she wants to make sure that UTI is not causing her discomfort    D/W: RN  DVT Prophylaxis: Pneumatic Compression Devices  Code Status: DNR/DNI    Disposition: Expected discharge pending palliative care and  evaluation    Tomy Aguilar MD    Interval History  Patient had new neurological symptoms yesterday afternoon, workup revealed new acute ischemic stroke.  Intermittently confused yesterday afternoon but has been calm throughout the night and this morning    -Data reviewed today: I reviewed all new labs and imaging results over the last 24 hours. I personally reviewed the EKG tracing showing nsr.    Physical Exam  Temp: 98.1  F (36.7  C) Temp src: Axillary BP: 150/79 mmHg Pulse: 99   Resp: 16 SpO2: 94 % O2 Device: None (Room air) Oxygen Delivery: 2 LPM  There were no vitals filed for this visit.  Vital Signs with Ranges  Temp:  [97.3  F (36.3  C)-98.1  F (36.7  C)] 98.1  F (36.7  C)  Pulse:  [70-99] 99  Resp:  [12-18] 16  BP: (138-162)/(61-85) 150/79 mmHg  SpO2:  [94 %-100 %] 94 %  I/O last 3 completed shifts:  In: 1411 [P.O.:480; I.V.:931]  Out: 200 [Urine:200]    Constitutional: AAOX2, NAD, answers simple questions very appropriately  HEENT: Moist oral mucosa, no oral lesions, No pallor or icterus  Neck- Supple, Good ROM, No JVD  Respiratory:  No crackles, No wheezes, CTA B/L, Normal WOB  Cardiovascular: RRR, No murmur  GI: Soft, Non- tender, BS- normoactive, No Guarding/rebound/rigidity  Skin/Integument: Warm and dry, no rashes  MSK: No joint deformity or swelling, no edema  Neuro: left facial droop, slurred speech, moves all four appropriately     Medications    NaCl 75 mL/hr at 01/25/17 0715       cefTRIAXone  1 g Intravenous Q24H        Data    Recent Labs  Lab 01/24/17  0805 01/23/17  2155 01/23/17  1755 01/23/17  1406   WBC  --   --   --  7.9   HGB  --   --   --  12.0   MCV  --   --   --  87   PLT  --   --   --  310   NA  --   --   --  140   POTASSIUM  --   --   --  4.1   CHLORIDE  --   --   --  107   CO2  --   --   --  26   BUN  --   --   --  20   CR  --   --   --  0.73   ANIONGAP  --   --   --  7   SAMIR  --   --   --  8.6   GLC  --   --   --  119*   ALBUMIN  --   --   --  3.5   PROTTOTAL  --   --   --  6.6*   BILITOTAL  --   --   --  0.4   ALKPHOS  --   --   --  93   ALT  --   --   --  15   AST  --   --   --  18   TROPI 0.190* 0.340* 0.308* 0.269*       Recent Results (from the past 24 hour(s))   MR Brain w/o & w Contrast    Narrative    MRI BRAIN WITHOUT AND WITH CONTRAST January 24, 2017 12:09 PM    HISTORY: Confusion.     TECHNIQUE: Multiplanar, multisequence MRI of the brain without and  with 6mL Gadavist.    COMPARISON: 1/23/2017.    FINDINGS: Mild to moderate cerebral atrophy is present. There is a  focal signal abnormality in the right frontal lobe with volume loss  most likely due to an old infarct or an old area of encephalomalacia.  There is no evidence for any acute infarct or any intracranial  hemorrhage. Scattered nonspecific white matter changes are also  present in both hemispheres without mass effect. Vascular structures  are patent at the skull base. Postcontrast images do not show any  abnormal areas of enhancement or any focal mass lesions.      Impression    IMPRESSION: Chronic changes. No evidence for intracranial hemorrhage  or any acute process.    JORDYN COLON MD   MR Brain w/o Contrast    Narrative    MRI BRAIN WITHOUT CONTRAST  1/24/2017 4:55 PM    HISTORY: DWI only to rule out acute stroke.    TECHNIQUE: Axial diffusion weighted image and an ADC map MRI of the  brain without gadolinium IV contrast material.    COMPARISON: 1/24/2017 at 11:28 AM    FINDINGS: Since this morning's exam the patient has developed an  acute  infarct in the right middle cerebral artery territory involving the  inferior lateral right frontal lobe cortex and the anterior right  insula. This shows limited diffusion. The area of encephalomalacia and  gliosis and T2 shine through in the right frontal lobe noted on the  earlier exam today is unchanged. No other acute infarct is visible.      Impression    IMPRESSION: Interval development of an acute infarct in the lateral  inferior right frontal lobe and anterior right insula in the right  middle cerebral artery territory.    EFRA JIMENEZ MD   US Carotid Bilateral    Narrative    BILATERAL DUPLEX CAROTID ULTRASOUND 1/24/2017 5:12 PM     HISTORY: Stroke with left facial droop.    COMPARISON: None.    FINDINGS:  There is mild atherosclerotic plaque in the carotid  bifurcations.  Flow velocities and waveforms show less than 50%  diameter stenosis in both the right and left internal carotid arteries  as assessed by each ICA PSV and EDV and ICA/DCCA ratio.  Antegrade  flow is present in both vertebral and external carotid arteries.      Impression    IMPRESSION:  Less than 50% diameter stenosis of both internal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

## 2017-01-23 NOTE — IP AVS SNAPSHOT
` [place] [address] ONCOLOGY: [phone]            Medication Administration Report for Enoc Taylor as of 01/28/17 1552   Legend:    Given Hold Not Given Due Canceled Entry Other Actions    Time Time (Time) Time  Time-Action       Inactive    Active    Linked        Medications 01/22/17 01/23/17 01/24/17 01/25/17 01/26/17 01/27/17 01/28/17    acetaminophen (TYLENOL) Suppository 650 mg  Dose: 650 mg Freq: EVERY 4 HOURS PRN Route: RE  PRN Reason: mild pain  Start: 01/23/17 1713   Admin Instructions: Alternate ibuprofen (if ordered) with acetaminophen.  Maximum acetaminophen dose from all sources = 75 mg/kg/day not to exceed 4 grams/day.               acetaminophen (TYLENOL) tablet 650 mg  Dose: 650 mg Freq: EVERY 4 HOURS PRN Route: PO  PRN Reason: mild pain  Start: 01/23/17 1713   Admin Instructions: Alternate ibuprofen (if ordered) with acetaminophen.  Maximum acetaminophen dose from all sources = 75 mg/kg/day not to exceed 4 grams/day.               aspirin EC EC tablet 81 mg  Dose: 81 mg Freq: DAILY Route: PO  Start: 01/27/17 1345   Admin Instructions: DO NOT CRUSH.          1430 (81 mg)-Given        0920 (81 mg)-Given           atorvastatin (LIPITOR) tablet 10 mg  Dose: 10 mg Freq: DAILY Route: PO  Start: 01/27/17 1345         1430 (10 mg)-Given        0920 (10 mg)-Given           benztropine (COGENTIN) tablet 1-2 mg  Dose: 1-2 mg Freq: 3 TIMES DAILY PRN Route: PO  PRN Reason: other  PRN Comment: for extrapyramidal effects  Start: 01/23/17 1713   Admin Instructions: Do NOT exceed 4 mg /24 hrs               carvedilol (COREG) tablet 3.125 mg  Dose: 3.125 mg Freq: 2 TIMES DAILY WITH MEALS Route: PO  Start: 01/27/17 1800   Admin Instructions: Hold for HR <60          1704 (3.125 mg)-Given        0920 (3.125 mg)-Given       [ ] 1800           haloperidol lactate (HALDOL) injection 0.5-1 mg  Dose: 0.5-1 mg Freq: EVERY 1 HOUR PRN Route: IV  PRN Reason: agitation  Start: 01/25/17 0905   Admin Instructions:  Call MD if agitation not relieved at these doses.  Contraindicated in Parkinsonism.        2117 (0.5 mg)-Given              HYDROmorphone (PF) (DILAUDID) injection 0.2 mg  Dose: 0.2 mg Freq: EVERY 1 HOUR PRN Route: IV  PRN Reason: moderate to severe pain  PRN Comment: or dyspnea  Start: 01/25/17 0905        0900 (0.2 mg)-Given [C]       1028 (0.2 mg)-Given [C]             ibuprofen (ADVIL/MOTRIN) suspension 400 mg  Dose: 400 mg Freq: EVERY 6 HOURS PRN Route: PO  PRN Reason: moderate pain  Start: 01/26/17 1031   Admin Instructions: Shake well.  Recommended for infants age 6 months and older.         1217 (400 mg)-Given [C]             lisinopril (PRINIVIL/ZESTRIL) tablet 5 mg  Dose: 5 mg Freq: DAILY Route: PO  Start: 01/27/17 1345   Admin Instructions: Hold for SBP < 100          1430 (5 mg)-Given        0920 (5 mg)-Given           naloxone (NARCAN) injection 0.1-0.4 mg  Dose: 0.1-0.4 mg Freq: EVERY 2 MIN PRN Route: IV  PRN Reason: opioid reversal  Start: 01/23/17 1713   Admin Instructions: For respiratory rate LESS than or EQUAL to 8.  Partial reversal dose:  0.1 mg titrated q 2 minutes for Analgesia Side Effects Monitoring Sedation Level of 3 (frequently drowsy, arousable, drifts to sleep during conversation).Full reversal dose:  0.4 mg bolus for Analgesia Side Effects Monitoring Sedation Level of 4 (somnolent, minimal or no response to stimulation).               ondansetron (ZOFRAN-ODT) ODT tab 4 mg  Dose: 4 mg Freq: EVERY 6 HOURS PRN Route: PO  PRN Reason: nausea  Start: 01/25/17 0905   Admin Instructions: This is Step 1 of nausea and vomiting management.  If nausea not resolved in 15 minutes, go to Step 2 prochlorperazine (COMPAZINE). Do not push through foil backing. Peel back foil and gently remove. Place on tongue immediately. Administration with liquid unnecessary              Or  ondansetron (ZOFRAN) injection 4 mg  Dose: 4 mg Freq: EVERY 6 HOURS PRN Route: IV  PRN Reasons: nausea,vomiting  Start: 01/25/17  0905   Admin Instructions: This is Step 1 of nausea and vomiting management.  If nausea not resolved in 15 minutes, go to Step 2 prochlorperazine (COMPAZINE).               prochlorperazine (COMPAZINE) injection 5 mg  Dose: 5 mg Freq: EVERY 6 HOURS PRN Route: IV  PRN Reasons: nausea,vomiting  Start: 01/23/17 1713   Admin Instructions: This is Step 2 of nausea and vomiting management.   If nausea not resolved in 15 minutes, give metoclopramide (REGLAN) if ordered (step 3 of nausea and vomiting management)              Or  prochlorperazine (COMPAZINE) tablet 5 mg  Dose: 5 mg Freq: EVERY 6 HOURS PRN Route: PO  PRN Reason: vomiting  Start: 01/23/17 1713   Admin Instructions: This is Step 2 of nausea and vomiting management.   If nausea not resolved in 15 minutes, give metoclopramide (REGLAN) if ordered (step 3 of nausea and vomiting management)              Or  prochlorperazine (COMPAZINE) Suppository 12.5 mg  Dose: 12.5 mg Freq: EVERY 12 HOURS PRN Route: RE  PRN Reasons: nausea,vomiting  Start: 01/23/17 1713   Admin Instructions: This is Step 2 of nausea and vomiting management.   If nausea not resolved in 15 minutes, give metoclopramide (REGLAN) if ordered (step 3 of nausea and vomiting management)               QUEtiapine (SEROquel) half-tab 12.5-25 mg  Dose: 12.5-25 mg Freq: EVERY 6 HOURS PRN Route: PO  PRN Comment: Agitation  Start: 01/23/17 1713              senna-docusate (SENOKOT-S;PERICOLACE) 8.6-50 MG per tablet 1-2 tablet  Dose: 1-2 tablet Freq: 2 TIMES DAILY PRN Route: PO  PRN Comment: constipation   Start: 01/23/17 1713   Admin Instructions: If no bowel movement in 24 hours, increase to 2 tablets PO BID.  Hold for loose stools.   This is the first step of a three step constipation treatment protocol.              Discontinued Medications  Medications 01/22/17 01/23/17 01/24/17 01/25/17 01/26/17 01/27/17 01/28/17         Rate: 75 mL/hr Freq: CONTINUOUS Route: IV  Start: 01/24/17 1615   End: 01/26/17 0808        0715 ( )-New Bag       2118 ( )-New Bag        0808-Med Discontinued           Dose: 1 g Freq: EVERY 24 HOURS Route: IV  Indications of Use: URINARY TRACT INFECTION  Last Dose: 1 g (01/25/17 1157)  Start: 01/24/17 1115   End: 01/26/17 1149      1341 (1 g)-New Bag        1157 (1 g)-New Bag        1031 (1 g)-New Bag       1149-Med Discontinued           Dose: 1 mg Freq: AT BEDTIME PRN Route: PO  PRN Reason: sleep  Start: 01/23/17 1713   End: 01/26/17 0808   Admin Instructions: Do not give unless at least 6 hours of uninterrupted sleep is expected.         0808-Med Discontinued           Dose: 17 g Freq: DAILY PRN Route: PO  PRN Reason: constipation  Start: 01/23/17 1713   End: 01/26/17 0808   Admin Instructions: Give in 8oz of  water, juice, or soda. Hold for loose stools.  This is the second step of a three step constipation treatment protocol.  1 Packet = 17 grams. Mixed prescribed dose in 8 ounces of water. Follow with 8 oz. of water.         0808-Med Discontinued

## 2017-01-23 NOTE — IP AVS SNAPSHOT
[place] [address] ONCOLOGY: [phone]                                              INTERAGENCY TRANSFER FORM - PHYSICIAN ORDERS   2017                    Hospital Admission Date: 2017  JESUS ORELLANA   : 1/3/1924  Sex: Female        Attending Provider: Tomy Aguilar MD     Allergies:  No Known Allergies    Infection:  None   Service:  HOSPITALIST    Ht:  --   Wt:  --   Admission Wt:  --    BMI:  --   BSA:  --            Patient PCP Information     Provider PCP Type    Juan C Cage MD General      ED Clinical Impression     Diagnosis Description Comment Added By Time Added    Delirium [R41.0] Delirium [R41.0]  Shanta Wiggins APRN CNP 2017 10:11 AM    Acquired hypothyroidism [E03.9] Acquired hypothyroidism [E03.9]  Shanta Wiggins APRN CNP 2017 10:12 AM    Urinary tract infection without hematuria, site unspecified [N39.0] Urinary tract infection without hematuria, site unspecified [N39.0]  CounterShanta sommer APRN CNP 2017 10:15 AM    Hallucinations [R44.3] Hallucinations [R44.3]  Shanta Wiggins APRN CNP 2017 10:17 AM    NSTEMI (non-ST elevated myocardial infarction) (H) [I21.4] NSTEMI (non-ST elevated myocardial infarction) (H) [I21.4]  Tomy Aguilar MD 2017 12:06 PM      Hospital Problems as of 2017              Priority Class Noted POA    Delirium Medium  2017 Yes    General weakness Medium  2017 Yes      Non-Hospital Problems as of 2017              Priority Class Noted    Acquired hypothyroidism   2008    Hyperlipidemia LDL goal <160   2008    HPV (human papilloma virus) infection   Unknown    Wrist fracture   2015    Impacted cerumen of right ear   2015    Preventive measure   2015    Arthritis of ankle, left Medium  2015    Essential hypertension with goal blood pressure less than 140/90 Medium  2016    Memory loss Medium  2017      Code Status History     Date Active  Date Inactive Code Status Order ID Comments User Context    1/28/2017 12:08 PM  DNR/DNI [id number]  Tomy Aguilar MD Outpatient    1/25/2017  9:05 AM 1/28/2017 12:08 PM DNR/DNI [id number] Code status discussion is appropriately documented in the chart. Tomy Aguilar MD Inpatient    1/23/2017  5:13 PM 1/25/2017  9:05 AM DNR/DNI [id number]  Dax Matos MD Inpatient         Medication Review      START taking        Dose / Directions Comments    aspirin 81 MG EC tablet   Used for:  NSTEMI (non-ST elevated myocardial infarction) (H)        Dose:  81 mg   Take 1 tablet (81 mg) by mouth daily   Refills:  0        atorvastatin 10 MG tablet   Commonly known as:  LIPITOR   Used for:  NSTEMI (non-ST elevated myocardial infarction) (H)        Dose:  10 mg   Take 1 tablet (10 mg) by mouth daily   Quantity:  30 tablet   Refills:  0        carvedilol 3.125 MG tablet   Commonly known as:  COREG   Used for:  NSTEMI (non-ST elevated myocardial infarction) (H)        Dose:  3.125 mg   Take 1 tablet (3.125 mg) by mouth 2 times daily (with meals)   Quantity:  60 tablet   Refills:  0        lisinopril 5 MG tablet   Commonly known as:  PRINIVIL/ZESTRIL   Used for:  NSTEMI (non-ST elevated myocardial infarction) (H)        Dose:  5 mg   Take 1 tablet (5 mg) by mouth daily   Quantity:  30 tablet   Refills:  0          CONTINUE these medications which may have CHANGED, or have new prescriptions. If we are uncertain of the size of tablets/capsules you have at home, strength may be listed as something that might have changed.        Dose / Directions Comments    levothyroxine 50 MCG tablet   Commonly known as:  SYNTHROID/LEVOTHROID   This may have changed:    - medication strength  - how much to take   Used for:  Acquired hypothyroidism        Dose:  50 mcg   Take 1 tablet (50 mcg) by mouth daily   Refills:  0          CONTINUE these medications which have NOT CHANGED        Dose / Directions Comments    B-12 1000 MCG Tbcr    Used for:  Vitamin B 12 deficiency        Dose:  1000 mcg   Take 1,000 mcg by mouth daily   Quantity:  100 tablet   Refills:  1          STOP taking     IBUPROFEN PO                     Further instructions from your care team       [place] [id number] [place], [place] [id number] [phone] Fax: [phone]    After Care     Activity - Up with nursing assistance           Advance Diet as Tolerated       Follow this diet upon discharge: Orders Placed This Encounter  Combination Diet Regular Diet Adult; Thin Liquids (water, ice chips, juice, milk, gelatin, ice cream, etc)         Fall precautions           General info for SNF       Length of Stay Estimate: Short Term Care: Estimated # of Days <30  Condition at Discharge: Stable  Level of care:skilled   Rehabilitation Potential: Fair  Admission H&P remains valid and up-to-date: Yes  Recent Chemotherapy: N/A  Use Nursing Home Standing Orders: Yes       Mantoux instructions       Give two-step Mantoux (PPD) Per Facility Policy Yes             Referrals     Occupational Therapy Adult Consult       Evaluate and treat as clinically indicated.    Reason:  Deconditioning       Physical Therapy Adult Consult       Evaluate and treat as clinically indicated.    Reason:  Deconditioning             Your next 10 appointments already scheduled     Feb 10, 2017  1:45 PM   (Arrive by 1:30 PM)   Return Visit with Reece Dean MD   [facility] ([facility])    [address] [place] [id number] [phone]              Follow-Up Appointment Instructions     Future Labs/Procedures    Follow Up and recommended labs and tests     Comments:    Follow up with half-way physician.  The following labs/tests are recommended: TSH/Free T4 in 4 weeks.      Follow-Up Appointment Instructions     Follow Up and recommended labs and tests       Follow up with half-way physician.  The following  labs/tests are recommended: TSH/Free T4 in 4 weeks.             Statement of Approval     Ordered          01/28/17 1210  I have reviewed and agree with all the recommendations and orders detailed in this document.   EFFECTIVE NOW     Approved and electronically signed by:  Tomy Aguilar MD
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
## 2017-01-23 NOTE — ED NOTES
[facility]  ED Nurse Handoff Report    ED Chief complaint: Altered Mental Status      ED Diagnosis:   Final diagnoses:   TIA (transient ischemic attack)       Code Status: need to be determined    Allergies: No Known Allergies    Activity level:  Stand with Assist     Needed?: No    Isolation: No  Infection: Not Applicable    Bariatric?: No      Vital Signs:   Filed Vitals:    01/23/17 1343   BP: 180/48   Pulse: 83   Temp: 97.6  F (36.4  C)   TempSrc: Oral   Resp: 16   SpO2: 98%       Cardiac Rhythm: ,        Pain level: 0-10 Pain Scale: 0    Is this patient confused?: No; currently not confused but daughter brought pt in with TIA symptoms earlier in the day    Patient Report: Initial Complaint: Short term memory loss  Focused Assessment: pt was alert and orientated, HTN   Tests Performed: Blood, CXR, CT  Abnormal Results: Trop  Treatments provided: monitor and asa    Family Comments: daughter very supportive; pt lives in an assisted living building but she is mostly independent; today she took the build's shudle bus to the grocery store and the  noticed she could figure out how to pay and she had an unsteady gait; once back at the home the facility called her daughter. By the time she arrived in ED her symptoms were resolved     OBS brochure/video discussed/provided to patient: Yes    ED Medications:   Medications   0.9% sodium chloride BOLUS (1,000 mLs Intravenous New Bag 1/23/17 1420)     Followed by   0.9% sodium chloride infusion (not administered)   aspirin tablet 325 mg (not administered)       Drips infusing?:  Yes      ED NURSE PHONE NUMBER: [phone]

## 2017-01-23 NOTE — IP AVS SNAPSHOT
MRN:[id number]                      After Visit Summary   1/23/2017    Enoc Taylor    MRN: [id number]           Thank you!     Thank you for choosing [facility] for your care. Our goal is always to provide you with excellent care. Hearing back from our patients is one way we can continue to improve our services. Please take a few minutes to complete the written survey that you may receive in the mail after you visit with us. Thank you!        Patient Information     Date Of Birth          1/3/1924        About your hospital stay     You were admitted on:  January 23, 2017 You last received care in the:  [place] [address] [facility]    You were discharged on:  January 28, 2017       Who to Call     For medical emergencies, please call 911.  For non-urgent questions about your medical care, please call your primary care provider or clinic, [phone]          Attending Provider     Provider    Faheem Thorpe MD Goertz, MD Carlo Rivera, MD Lauren Phillips, MD Tomy       Primary Care Provider Office Phone # Fax #    Juan C Rohit Cage [phone]       [address] [address]        After Care Instructions     Activity - Up with nursing assistance           Advance Diet as Tolerated       Follow this diet upon discharge: Orders Placed This Encounter  Combination Diet Regular Diet Adult; Thin Liquids (water, ice chips, juice, milk, gelatin, ice cream, etc)              Fall precautions           General info for SNF       Length of Stay Estimate: Short Term Care: Estimated # of Days <30  Condition at Discharge: Stable  Level of care:skilled   Rehabilitation Potential: Fair  Admission H&P remains valid and up-to-date: Yes  Recent Chemotherapy: N/A  Use Nursing Home Standing Orders: Yes            Mantoux instructions       Give two-step Mantoux (PPD) Per Facility Policy Yes                  Follow-up  Appointments     Follow Up and recommended labs and tests       Follow up with care home physician.  The following labs/tests are recommended: TSH/Free T4 in 4 weeks.                  Your next 10 appointments already scheduled     Feb 10, 2017  1:45 PM   (Arrive by 1:30 PM)   Return Visit with Reece Dean MD   [facility] Neurology ([facility])    [address]  3rd Floor  [place] [address] [phone]              Additional Services     Occupational Therapy Adult Consult       Evaluate and treat as clinically indicated.    Reason:  Deconditioning            Physical Therapy Adult Consult       Evaluate and treat as clinically indicated.    Reason:  Deconditioning                  Further instructions from your care team       [address] [address] [phone] Fax: [phone]    Pending Results     No orders found from 1/22/2017 to 1/24/2017.            Statement of Approval     Ordered          01/28/17 1210  I have reviewed and agree with all the recommendations and orders detailed in this document.   EFFECTIVE NOW     Approved and electronically signed by:  Tomy Aguilar MD             Admission Information        Provider Department Dept Phone    1/23/2017 Tomy Aguilar MD  [facility] [phone]      Your Vitals Were     Blood Pressure Pulse Temperature Respirations Pulse Oximetry       155/61 mmHg 80 97.1  F (36.2  C) (Oral) 19 96%       Care EveryWhere ID     This is your Care EveryWhere ID. This could be used by other organizations to access your [facility] medical records  [id number]           Review of your medicines      START taking        Dose / Directions    aspirin 81 MG EC tablet   Used for:  NSTEMI (non-ST elevated myocardial infarction) (H)        Dose:  81 mg   Take 1 tablet (81 mg) by mouth daily   Refills:  0       atorvastatin 10 MG tablet   Commonly known as:  LIPITOR    Used for:  NSTEMI (non-ST elevated myocardial infarction) (H)        Dose:  10 mg   Take 1 tablet (10 mg) by mouth daily   Quantity:  30 tablet   Refills:  0       carvedilol 3.125 MG tablet   Commonly known as:  COREG   Used for:  NSTEMI (non-ST elevated myocardial infarction) (H)        Dose:  3.125 mg   Take 1 tablet (3.125 mg) by mouth 2 times daily (with meals)   Quantity:  60 tablet   Refills:  0       lisinopril 5 MG tablet   Commonly known as:  PRINIVIL/ZESTRIL   Used for:  NSTEMI (non-ST elevated myocardial infarction) (H)        Dose:  5 mg   Take 1 tablet (5 mg) by mouth daily   Quantity:  30 tablet   Refills:  0         CONTINUE these medicines which may have CHANGED, or have new prescriptions. If we are uncertain of the size of tablets/capsules you have at home, strength may be listed as something that might have changed.        Dose / Directions    levothyroxine 50 MCG tablet   Commonly known as:  SYNTHROID/LEVOTHROID   This may have changed:    - medication strength  - how much to take   Used for:  Acquired hypothyroidism        Dose:  50 mcg   Take 1 tablet (50 mcg) by mouth daily   Refills:  0         CONTINUE these medicines which have NOT CHANGED        Dose / Directions    B-12 1000 MCG Tbcr   Used for:  Vitamin B 12 deficiency        Dose:  1000 mcg   Take 1,000 mcg by mouth daily   Quantity:  100 tablet   Refills:  1         STOP taking     IBUPROFEN PO                Where to get your medicines      Some of these will need a paper prescription and others can be bought over the counter. Ask your nurse if you have questions.     You don't need a prescription for these medications    - aspirin 81 MG EC tablet  - atorvastatin 10 MG tablet  - carvedilol 3.125 MG tablet  - levothyroxine 50 MCG tablet  - lisinopril 5 MG tablet             Protect others around you: Learn how to safely use, store and throw away your medicines at www.disposemymeds.org.             Medication List: This is a list of  all your medications and when to take them. Check marks below indicate your daily home schedule. Keep this list as a reference.      Medications           Morning Afternoon Evening Bedtime As Needed    aspirin 81 MG EC tablet   Take 1 tablet (81 mg) by mouth daily   Last time this was given:  81 mg on 1/28/2017  9:20 AM                                   atorvastatin 10 MG tablet   Commonly known as:  LIPITOR   Take 1 tablet (10 mg) by mouth daily   Last time this was given:  10 mg on 1/28/2017  9:20 AM                                   B-12 1000 MCG Tbcr   Take 1,000 mcg by mouth daily                                   carvedilol 3.125 MG tablet   Commonly known as:  COREG   Take 1 tablet (3.125 mg) by mouth 2 times daily (with meals)   Last time this was given:  3.125 mg on 1/28/2017  9:20 AM                                      levothyroxine 50 MCG tablet   Commonly known as:  SYNTHROID/LEVOTHROID   Take 1 tablet (50 mcg) by mouth daily   Last time this was given:  75 mcg on 1/24/2017  6:35 AM                                   lisinopril 5 MG tablet   Commonly known as:  PRINIVIL/ZESTRIL   Take 1 tablet (5 mg) by mouth daily   Last time this was given:  5 mg on 1/28/2017  9:20 AM

## 2017-01-23 NOTE — IP AVS SNAPSHOT
[place] [address] ONCOLOGY: [phone]                                              INTERAGENCY TRANSFER FORM - LAB / IMAGING / EKG / EMG RESULTS   2017                    Hospital Admission Date: 2017  JESUS ORELLANA   : 1/3/1924  Sex: Female        Attending Provider: Tomy Aguilar MD     Allergies:  No Known Allergies    Infection:  None   Service:  HOSPITALIST    Ht:  --   Wt:  --   Admission Wt:  --    BMI:  --   BSA:  --            Patient PCP Information     Provider PCP Type    Juan C Cage MD General         Lab Results - 3 Days (17 - 17)      Lipid panel reflex to direct LDL [[id number]] (Abnormal)  Resulted: 17 0826, Result status: Final result    Ordering provider: Tomy Aguilar MD  17 0000 Resulting lab: [facility]    Specimen Information    Type Source Collected On   Blood  17 0754          Components       Value Reference Range Flag Lab   Cholesterol 174 <200 mg/dL  FrStHsLb   Triglycerides 75 <150 mg/dL  FrStHsLb   HDL Cholesterol 44 >49 mg/dL L FrStHsLb   LDL Cholesterol Calculated 115 <100 mg/dL H FrStHsLb   Comment:         Above desirable:  100-129 mg/dl   Borderline High:  130-159 mg/dL   High:             160-189 mg/dL   Very high:       >189 mg/dl     Non HDL Cholesterol 130 <130 mg/dL H FrStHsLb   Comment:         Above Desirable:  130-159 mg/dl   Borderline high:  160-189 mg/dl   High:             190-219 mg/dl   Very high:       >219 mg/dl              Testing Performed By     Lab - Abbreviation Name Director Address Valid Date Range    14 - L [facility] Unknown [address] [place] [address] 05/08/15 1057 - Present            Unresulted Labs     None         ECG/EMG Results (17 - 17)      ECHO COMPLETE WITH OPTISON [[id number]]  Resulted: 17 1253, Result status: Edited Result - FINAL    Ordering provider: Tomy Aguilar MD  17 0750 Resulted by:  Roc Anne MD    Performed: 17 1331 - 17 1333 Resulting lab: RADIOLOGY RESULTS    Narrative:       [id number]  [id number]  [id number]  [id number]^TIA^MARIA ANTONIA^           [facility]  Echocardiography Laboratory  [address] [place] [address]        Name: JESUS ORELLANA  MRN: [id number]  : 1924  Study Date: 2017 12:53 PM  Age: 89 yrs  Gender: Female  Patient Location: [facility]  Reason For Study: MI  Ordering Physician: MARIA ANTONIA WEBER  Referring Physician: Juan C Cage  Performed By: Norma Mcmullen RDCS     BSA: 1.6 m2  Height: 62 in  Weight: 128 lb  HR: 78  BP: 150/86 mmHg  _____________________________________________________________________________  __        Procedure  Contrast Optison.  _____________________________________________________________________________  __        Interpretation Summary     Left ventricular systolic function is severely reduced.  The visual ejection fraction is estimated at 28%.  The study was technically adequate. There is no comparison study available.  _____________________________________________________________________________  __        Left Ventricle  The left ventricle is normal in size. There is mild concentric left  ventricular hypertrophy. Left ventricular systolic function is severely  reduced. The visual ejection fraction is estimated at 28%. Grade II or  moderate diastolic dysfunction. E by E prime ratio is greater than 15, that  likely suggests increased left ventricular filling pressures. There is mod-  severe global hypokinesia of the left ventricle. There is septal akinesis.  There is severe inferior wall hypokinesis.     Right Ventricle  The right ventricle is normal size. The right ventricular systolic function is  normal.     Atria  Normal left atrial size. Right atrial size is normal. There is no color  Doppler evidence of an atrial shunt.     Mitral Valve  There is trace to mild mitral regurgitation.         Tricuspid Valve  There is trace tricuspid regurgitation. The right ventricular systolic  pressure is approximated at 28.2 mmHg plus the right atrial pressure. Normal  IVC (1.5-2.5cm) with >50% respiratory collapse; right atrial pressure is  estimated at 5-10mmHg.     Aortic Valve  There is mild trileaflet aortic sclerosis. There is mild (1+) aortic  regurgitation. No aortic stenosis is present.     Pulmonic Valve  The pulmonic valve is not well seen, but is grossly normal.     Vessels  The aortic root is normal size.     Pericardium  There is no pericardial effusion.        Rhythm  The rhythm was normal sinus.  _____________________________________________________________________________  __  MMode/2D Measurements & Calculations  IVSd: 1.2 cm     LVIDd: 4.9 cm  LVIDs: 3.8 cm  LVPWd: 1.1 cm  FS: 22.1 %  EDV(Teich): 114.6 ml  ESV(Teich): 63.6 ml  LV mass(C)d: 206.2 grams  Ao root diam: 3.3 cm  LA dimension: 3.7 cm  asc Aorta Diam: 3.3 cm  LA/Ao: 1.1        Doppler Measurements & Calculations  MV E max ifeoma: 58.7 cm/sec  MV A max ifeoma: 56.3 cm/sec  MV E/A: 1.0  MV dec time: 0.15 sec  Ao V2 max: 118.2 cm/sec  Ao max P.6 mmHg  Ao V2 mean: 85.1 cm/sec  Ao mean PG: 3.1 mmHg  Ao V2 VTI: 22.2 cm  LV V1 max P.8 mmHg  LV V1 max: 66.5 cm/sec  LV V1 VTI: 14.0 cm  PA acc time: 0.08 sec  TR max ifeoma: 265.4 cm/sec  TR max P.2 mmHg  Lateral E/e': 10.8              _____________________________________________________________________________  __        Report approved by: Daniella Crum 2017 05:03 PM       1    Type Source Collected On     17 1253          View Image (below)        Echocardiogram Complete [[id number]]  Resulted: 17 1332, Result status: In process    Ordering provider: Tomy Aguilar MD  17 0750 Performed: 17 1331 - 17 1331    Resulting lab: [facility]IANT                   Encounter-Level Documents:     There are no encounter-level documents.      Order-Level  Documents:     There are no order-level documents.

## 2017-01-24 PROBLEM — R53.1 GENERAL WEAKNESS: Status: ACTIVE | Noted: 2017-01-01

## 2017-01-24 NOTE — PROGRESS NOTES
[facility]    Hospitalist Progress Note    Date of Service (when I saw the patient): 01/24/2017    Assessment and Plan   Ms. Enoc Kahn is a 89-year-old  lady with a past medical history notable for mild to moderate memory loss, hypothyroidism and recent history of hallucinations who has presented with acute confusion.      1.  Acute delirium:    -The history is highly suggestive of delirium.    -UA suggestive of possible UTI; will await the response after treatment of UTI  -MRI of the brain without acute findings.   -PT/OTevaluation and therapy.   -Neurology consult appreciated  -delirium protocol and Seroquel available prn.    2.  Hypothyroidism; overcorrected:   -The patient has a history of hyperthyroidism treated with thyroid ablation several years ago with subsequent development of acquired hypothyroidism.    -Her recent TSH was less than 0.01 on 01/17/2017 and the dose of levothyroxine was reduced to 75 mcg p.o. daily by her primary care provider.    -At presentation TSH is still less than 0.01  -hold levothyroxine for now   -Resume reduced dose at the time of discharge    3. Mild to moderate memory impairment:    -Patient apparently has mild to moderate memory impairment at baseline  -Could have been made worse by UTI and overcorrected hypothyroidism  -Further evaluation as outpatient per neurology    4.  Hypertension:    -The blood pressure in the Emergency Room was elevated at 180/48.    -She has no previous history of hypertension.    -labetalol available for systolic blood pressure more than 180.    -Optimally controlled at the moment is    5.  Elevated troponin I:    -Troponin I has been checked in the Emergency Department with no true indication, which was slightly elevated at 0.269.  T  -Peaked at 0.34  -Patient denies chest pain  -Check 2-D echo    6. UTI  -UA suggestive of UTI  -Start Rocephin 1 g IV daily, await urine culture    D/W: RN  DVT Prophylaxis: Pneumatic  Compression Devices  Code Status: DNR/DNI    Disposition: Expected discharge in 1-2 days    Tomy Aguilar MD    Interval History  Still forgetful and confused.  Denies nausea or vomiting.  No chest pain or dyspnea.    -Data reviewed today: I reviewed all new labs and imaging results over the last 24 hours. I personally reviewed the EKG tracing showing nsr.    Physical Exam  Temp: 97.9  F (36.6  C) Temp src: Oral BP: 139/74 mmHg Pulse: 86   Resp: 16 SpO2: 99 % O2 Device: None (Room air)    There were no vitals filed for this visit.  Vital Signs with Ranges  Temp:  [97.5  F (36.4  C)-98  F (36.7  C)] 97.9  F (36.6  C)  Pulse:  [81-86] 86  Resp:  [16] 16  BP: (137-171)/(62-86) 139/74 mmHg  SpO2:  [93 %-99 %] 99 %  I/O last 3 completed shifts:  In: 580 [P.O.:580]  Out: -     Constitutional: AAOX1, NAD, Appears comfortable  HEENT: Moist oral mucosa, no oral lesions, No pallor or icterus  Neck- Supple, Good ROM, No JVD  Respiratory:  No crackles, No wheezes, CTA B/L, Normal WOB  Cardiovascular: RRR, No murmur  GI: Soft, Non- tender, BS- normoactive, No Guarding/rebound/rigidity  Skin/Integument: Warm and dry, no rashes  MSK: No joint deformity or swelling, no edema  Neuro: CN- grossly intact, moving all four     Medications       cefTRIAXone  1 g Intravenous Q24H     cyanocobalamin  1,000 mcg Oral Daily     aspirin EC EC tablet 81 mg  81 mg Oral Daily       Data    Recent Labs  Lab 01/24/17  0805 01/23/17  2155 01/23/17  1755 01/23/17  1406   WBC  --   --   --  7.9   HGB  --   --   --  12.0   MCV  --   --   --  87   PLT  --   --   --  310   NA  --   --   --  140   POTASSIUM  --   --   --  4.1   CHLORIDE  --   --   --  107   CO2  --   --   --  26   BUN  --   --   --  20   CR  --   --   --  0.73   ANIONGAP  --   --   --  7   SAMIR  --   --   --  8.6   GLC  --   --   --  119*   ALBUMIN  --   --   --  3.5   PROTTOTAL  --   --   --  6.6*   BILITOTAL  --   --   --  0.4   ALKPHOS  --   --   --  93   ALT  --   --   --  15   AST   --   --   --  18   TROPI 0.190* 0.340* 0.308* 0.269*       Recent Results (from the past 24 hour(s))   MR Brain w/o & w Contrast    Narrative    MRI BRAIN WITHOUT AND WITH CONTRAST January 24, 2017 12:09 PM    HISTORY: Confusion.     TECHNIQUE: Multiplanar, multisequence MRI of the brain without and  with 6mL Gadavist.    COMPARISON: 1/23/2017.    FINDINGS: Mild to moderate cerebral atrophy is present. There is a  focal signal abnormality in the right frontal lobe with volume loss  most likely due to an old infarct or an old area of encephalomalacia.  There is no evidence for any acute infarct or any intracranial  hemorrhage. Scattered nonspecific white matter changes are also  present in both hemispheres without mass effect. Vascular structures  are patent at the skull base. Postcontrast images do not show any  abnormal areas of enhancement or any focal mass lesions.      Impression    IMPRESSION: Chronic changes. No evidence for intracranial hemorrhage  or any acute process.    OJRDYN COLON MD    will

## 2017-01-24 NOTE — PROGRESS NOTES
01/24/17 0900   Quick Adds   Type of Visit Initial PT Evaluation   Living Environment   Lives With alone   Living Arrangements apartment;independent living facility   Home Accessibility no concerns   Number of Stairs to Enter Home 0   Number of Stairs Within Home 0   Transportation Available family or friend will provide   Living Environment Comment PT: pt lives alone in an independent living apartment.  Pt's daughter reports she will be unable to stay with pt. Pt's daughter reports pt uses a 4WW mainly to carry things on the seat and out in the community in case she needs to sit.    Self-Care   Usual Activity Tolerance good   Current Activity Tolerance moderate   Regular Exercise yes   Activity/Exercise Type walking   Exercise Amount/Frequency daily   Equipment Currently Used at Home walker, rolling  (4WW)   Functional Level Prior   Ambulation 0-->independent  (sometimes uses 4WW out in community in case she needs to sit)   Transferring 0-->independent   Toileting 0-->independent   Bathing 0-->independent   Dressing 0-->independent   Eating 0-->independent   Communication 0-->understands/communicates without difficulty   Swallowing 0-->swallows foods/liquids without difficulty   Cognition 1 - attention or memory deficits   Fall history within last six months yes   Number of times patient has fallen within last six months 1   Which of the above functional risks had a recent onset or change? ambulation;transferring;cognition   General Information   Onset of Illness/Injury or Date of Surgery - Date 01/23/17   Referring Physician Dax Matos MD   Patient/Family Goals Statement pt's daughter would like for pt to be able to return to her independent living apartment.    Pertinent History of Current Problem (include personal factors and/or comorbidities that impact the POC) Enoc Taylor is a 89 year old female who presents to the emergency department today for evaluation of altered mental status. The patient had a  "sudden onset of confusion while shopping at [employer] today around 0900. She only remembers going shopping but does not remember very many details. 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 Symptoms have resolved now except she can't remember what happened this morning. Her unsteady gait is gone. She recently had her Levothyroxine lowered. The patient denies chest pain, shortness of breath, abdominal pain, diarrhea, hematuria, dysuria, urgency, or frequency changes.     Precautions/Limitations fall precautions   General Observations PT: pt supine in bed upon arrival with daughter present and agreeable to PT session.    General Info Comments Activity: Ambulate with assist 4 TIMES DAILY     Cognitive Status Examination   Orientation person;place   Level of Consciousness alert;confused   Follows Commands and Answers Questions 100% of the time   Personal Safety and Judgment impaired   Cognitive Comment PT: pt oriented to person and place but not month or year stating \"February 20 something.\"  During conversation, pt made several statements that indicate confusion and is easily distractable.    Pain Assessment   Patient Currently in Pain No   Strength   Strength Comments PT: bilateral LE strength at least 3/5.   Bed Mobility   Bed Mobility Comments PT: SBA.   Transfer Skills   Transfer Comments PT: SBA.   Gait   Gait Comments PT: close SBA.   Balance   Balance Comments PT: mild instability but no overt LOB with dynamic tasks.    General Therapy Interventions   Planned Therapy Interventions bed mobility training;gait training;transfer training   Clinical Impression   Criteria for Skilled Therapeutic Intervention yes, treatment indicated  (evaluation and 1 treatment )   PT Diagnosis impaired functional activity tolerance. " "  Influenced by the following impairments cognitive impairments, fatigue.   Functional limitations due to impairments impaired functional activity tolerance.   Clinical Presentation Stable/Uncomplicated   Clinical Presentation Rationale musculoskeletal system assessed.   Clinical Decision Making (Complexity) Low complexity   Therapy Frequency` other (see comments)  (evaluation and 1 treatment )   Predicted Duration of Therapy Intervention (days/wks) 1 day   Anticipated Equipment Needs at Discharge other (see comments)  (none )   Anticipated Discharge Disposition Other (see comments)  (home with 24 hour assist/supervision )   Risk & Benefits of therapy have been explained Yes   Patient, Family & other staff in agreement with plan of care Yes   [facility] TM \"6 Clicks\"   2016, Trustees of [facility], under license to [employer].  All rights reserved.   6 Clicks Short Forms Basic Mobility Inpatient Short Form   [facility]  \"6 Clicks\" V.2 Basic Mobility Inpatient Short Form   1. Turning from your back to your side while in a flat bed without using bedrails? 4 - None   2. Moving from lying on your back to sitting on the side of a flat bed without using bedrails? 4 - None   3. Moving to and from a bed to a chair (including a wheelchair)? 3 - A Little   4. Standing up from a chair using your arms (e.g., wheelchair, or bedside chair)? 3 - A Little   5. To walk in hospital room? 3 - A Little   6. Climbing 3-5 steps with a railing? 3 - A Little   Basic Mobility Raw Score (Score out of 24.Lower scores equate to lower levels of function) 20   Total Evaluation Time   Total Evaluation Time (Minutes) 10     "

## 2017-01-24 NOTE — PROGRESS NOTES
I was asked to see pt. As she developed new onset of L facial droop after brain MRI.    I came to see her and she is awake, has mild facial asymmetry, no additional weakness. Pt. Is awake and cooperative. Brain MRI was just done - negative for stroke.    Pt. Has UTI and there is some decrease in BPs.    I think I would rec to watch for now as deficit is very mild. If persists/gets worse - I rec to get HCT and CT angio or CUS.    I ordered CUS and I rec to give fluids to see if this will help    Charge nurse Magalys ameya Perales who was seen pt. Earlier today.    I spent 30 min for this evaluation. Pt. Was seen by Dr. Perales earlier today.    Time of evaluation is 3 pm
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
## 2017-01-24 NOTE — PLAN OF CARE
Problem: Goal Outcome Summary  Goal: Goal Outcome Summary  Outcome: No Change  Pt came in d/t episode of confusion.  Positive UTI and over treated thryroid. A/D to time, forgetful, neuros otherwise intact this morning until afternoon at 1430 where she developed a left facial droop.  Neurologist evaluated pt. CMS intact. LS clear. BSx4, voiding. Denies pain. Up with 1 assist. Regular diet. VSS, Tele is SD with 1st degree AVB and BBB. Continue to monitor and follow POC.

## 2017-01-24 NOTE — UTILIZATION REVIEW
"Admission Status; Secondary Review Determination      Primary Insurance:      Date Admitted:  01/23/2017   Date(s) Reviewed:  01/24/2017   Attending Physician:      Physician Advisor:  Raheem Tabares MD      Under the authority of the Utilization Management Committee, the utilization review process indicated a secondary review on the above patient. The review outcome is based on review of the medical records, discussions with staff, and applying clinical experience noted on the date of the review.              (X) Inpatient Status Appropriate - This patient's medical care is consistent with medical management for inpatient care and reasonable inpatient medical practice.             (  ) Observation Status Appropriate - This patient does not meet hospital inpatient criteria and is placed in observation status. If this patient's primary payer is Medicare and was admitted as an inpatient, Condition Code 44 should be used and patient status changed to \"observation\".                    (  ) Admission Status NOT Appropriate - This patient's medical care is not consistent with medical management for Inpatient or Observation Status.                    (  ) Outpatient Procedure Appropriate - This patient's medical care is consistent with medical management for outpatient procedure.                     (  ) Observation Concurrent Stay Review                    (  ) Inpatient Concurrent Stay Review                       RATIONALE FOR DETERMINATION      Enoc Taylor is a 89-year-old female with a history of some mild to moderate memory impairment who resides in a senior living facility independently.  The patient developed an acute onset of confusion when trying to pay for some groceries and had further disorientation, unable to complete her speech, unsteady in gait with visual hallucinations and thus sent to the emergency room for evaluation.  The patient initially admitted under observation status but continued to have " significant acute delirium with an acute facial droop and a followup urinalysis revealing marked pyuria.  The patient therefore felt to have an acute UTI with associated acute delirium appropriate for inpatient management.        The information on this document is developed by the utilization review team in order for the business office to ensure compliance. This only denotes the appropriateness of proper admission status and does not reflect the quality of care rendered.       The definitions of Inpatient Status and Observation Status used in making the determination above are those provided in the CMS Coverage Manual, Chapter 1 and Chapter 6, section 70.4.         JACOB ODONNELL MD             D: 2017 16:45   T: 2017 17:03   MT:       Name:     JESUS ORELLANA   MRN:      [id number]        Account:        [id number]   :      1924           Admit Date:                                       Discharge Date:       Document: [id number]

## 2017-01-24 NOTE — PLAN OF CARE
Problem: Goal Outcome Summary  Goal: Goal Outcome Summary  Outcome: Improving  TIA vs Encephalopathy. A/D to time, forgetful, neuros otherwise intact. CMS intact. LS clear. BSx4, voiding. Denies pain. Up with 1 assist. Regular diet. VSS, Tele is SD with 1st degree AVB and BBB. Discharge pending.

## 2017-01-24 NOTE — PLAN OF CARE
Problem: Goal Outcome Summary  Goal: Goal Outcome Summary  Physical Therapy Discharge Summary    Reason for therapy discharge:    evaluation and 1 treatment    Progress towards therapy goal(s). See goals on Care Plan in [facility] electronic health record for goal details.  Goals not met.  Barriers to achieving goals:   discharge on same date as initial evaluation.    Therapy recommendation(s):    No further therapy is recommended.     Recommend pt discharge home with 24 hour assist/supervision due to cognitive impairments.

## 2017-01-24 NOTE — PLAN OF CARE
Problem: Goal Outcome Summary  Goal: Goal Outcome Summary  Outcome: No Change  Neuros intact except forgetful. Up with assist of 1.

## 2017-01-24 NOTE — PROGRESS NOTES
I received a call from a nurse about further deterioration of pt`s condition. I rec DWI only to r/ou stroke.    I reviewed DWI and discussed case with Dr. Madison and I confirmed that pt. Is having acute stroke in R frontal area which is new.    I called back and spoke with daughter.    I discussed that :    1.I did not see pt. Earlier today and had a chance to see her around 3 pm after MRI was done and she started to have L facial droop.    2. He new MRI shows acute stroke.    3. No angiogram was done since [facility] admission. I ordered CUS but this was not done.    4. I received information that her ECHO showed EF of 28% and possible ischemia. Her Troponin was elevated. Signs of MI may be preclusion for tPA.    5. I discussed IV tPA and intraarterial tPA.    We spoke about quality of life, risk of bleed and long-term prognosis given heart condition.    I will speak with pt`s son Efren but daughter current;y did not rec tPA.     I spoke with son Efren and he confirmed that he does not want tPA.    I will ask nursing staff and ask for Hospitalist to help with IV fluids and cardiac status.  105.  I rec BPs at higher ranges given stroke but pt. Also has heart conditions. Ideally, BPs can be as high as 190-200/100  If it is OK with Hospitalits. I would rec to avoid BPs lower than 120/75-80 as it is not good for the stroke.

## 2017-01-24 NOTE — PLAN OF CARE
Problem: Goal Outcome Summary  Goal: Goal Outcome Summary  OT: Order received, chart reviewed. Pt admitted under observation for confusion. Several attempts to see pt this morning; however, pt and daughter having lengthy conversation with MD initially and then care coordinator. Plans for OT to wait with eval and intervention to allow for initiation of antibiotics with hope for cognition to clear.

## 2017-01-24 NOTE — CONSULTS
[facility]  [facility]    Neurology Consultation Note     Enoc Taylor MRN# [id number]   YOB: 1924 Age: 89 year old    Code Status:DNR/DNI   Date of Admission: 1/23/2017  Date of Consult: 01/24/2017    _________________________________   Primary Care Physician  Juan C Cage  ______________________________________________         Assessment and Plan  ______________________________________________  #. (R41.0) Delirium  (primary encounter diagnosis)  --CT negative  --history of similar spell with overcorrection of thyroid and alcohol  --see below  --will get MRI brain for further evaluation - overcorrection of thyroid can cause atrial fibrillation and increase risk of strokes  ---suspect her delirium will clear with correction of thyroid and treatment of UTI  #. (E03.9) Acquired hypothyroidism  --appears to be overcorrected again  --TSH <0.01  --management per hospitalist  #. (N39.0) Urinary tract infection without hematuria, site unspecified  --UA positive for UTI  ----will also contribute to delirium in patient  ----defer management to hospitalist  #. (R44.3) Hallucinations  --intermittent for months, seeing her sister in the room in her bed  ----likely has some degree of dementia at baseline  ----increasing memory trouble  --further evaluation as outpatient  #. DVT Prophylaxis  --defer to primary service  #. PT/OT/Speech  --continue evaluations  #. Nutrition / GI Prophylaxis  --Per recommendations of speech therapy      #. Code Status: DNR / DNI    Reason for consult: I was asked by Dr. Matos to evaluate this patient for confusion.      Chief Complaint  ______________________________________________  Confusion and hallucinations   History is obtained from the electronic health record    History of Present Illness  ______________________________________________  Enoc Taylor is a 89 year old female who presented with confusion and hallucinations. History  of mild to moderate memory impairment, lives in senior living facility. Went to the grocery store and appeared confused trying to pay for her goods. Appeared to be disoriented, usteady, and unable to complete speech upon return to her facility. In her room, she was having visual hallucinations of a calendar on her window, and per the daughter she had been having hallucinations about her sister who lives in [facility]. Had a similar episode in March 2016, which was due to overtreatment with levothyroxine and alcohol intoxication.     Daughter is at the bedside and notes she has some concerns about the patient still living alone. Few more memory issues, but overall feels her mother is pretty sharp for her age. Patient has confusion on memory testing but no focal physical deficits.      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 Rios in 5/08, pap with ascus and positive HPV (58 & 81)- pt declines colpo,      Past Surgical History  ______________________________________________  Past Surgical History   Procedure Laterality Date     Surgical history of -   1930     cyst removal from lower lip     C appendectomy  1933     Surgical history of -   1950?     cyst removal, rt shoulder     Tonsillectomy  1958     Rotator cuff repair rt/lt  1998     Surgical history of -   2007     excess bone removed from L foot     Surgical history of -   1986     repair of foot arch, Lt     Cataract iol, rt/lt  2007     Rt eye     C thyroid ablation  1998     Prior to Admission  Medications  ______________________________________________  Prior to Admission Medications   Prescriptions Last Dose Informant Patient Reported? Taking?   Cyanocobalamin (B-12) 1000 MCG TBCR Past Week at Unknown time  No Yes   Sig: Take 1,000 mcg by mouth daily   IBUPROFEN PO prn  Yes Yes   Sig: Take 200 mg by mouth every 8 hours as needed for moderate pain   levothyroxine (SYNTHROID/LEVOTHROID) 75 MCG tablet 1/23/2017 at am  No Yes   Sig: Take 1 tablet (75 mcg) by mouth daily      Facility-Administered Medications: None     Allergies  No Known Allergies    Social History  ______________________________________________  Social History     Social History     Marital Status:      Spouse Name: N/A     Number of Children: 3     Years of Education: N/A     Social History Main Topics     Smoking status: Never Smoker      Smokeless tobacco: Never Used     Alcohol Use: Yes      Comment: 2 drinks per week if that     Drug Use: No     Sexual Activity: No      Comment: .  Single and happy to be so.     Other Topics Concern     None     Social History Narrative    Social Documentation:        Balanced Diet: NO, could get more fruits and vegies.      Calcium intake: more than 2 per day- a lot of milk, skim    Caffeine: 0 per day    Exercise:  type of activity walk;  daily times per week    Sunscreen: No    Seatbelts:  Yes    Self Breast Exam:  Yes    Self Testicular Exam: No    Physical/Emotional/Sexual Abuse: No    Do you feel safe in your environment? Yes        Cholesterol screen up to date: No-more than 5 yrs ago, borderline, not fasting today.    Eye Exam up to date: Yes    Dental Exam up to date: Yes    Pap smear up to date: Last pap in 2005, abnl.  Last sexually active in 1999.  Abnl paps and bx's.    Mammogram up to date: No: 2005, give referral today.    Dexa Scan up to date: No: 5-8 yrs ago.    Colonoscopy up to date: No: never done. Pt refuses.    Immunizations up to date: Yes-2004 td    Glucose screen  if over 40:  No-pt not fasting       Family History  ______________________________________________  Family History   Problem Relation Age of Onset     Unknown/Adopted Mother      mother  at 100        Review of Systems  ______________________________________________  Review of systems is limited by patient factors - confusion      Physical Exam  ______________________________________________  Weight:0 lbs 0 oz; Height:Data Unavailable  Temp: 97.5  F (36.4  C) Temp src: Oral BP: 150/86 mmHg Pulse: 83   Resp: 16 SpO2: 96 % O2 Device: None (Room air)    General Appearance:  No acute distress  Neuro:       Mental Status Exam:   Awake, alert, oriented X1. Speech and language are intact. Mental status is confused       Cranial Nerves:  Pupils 3 mm, reactive. EOMI. Face sensation is normal. Face is symmetric. Tongue and uvula are midline. Other CN are normal           Motor:  5/5 X 4. Tone and bulk are normal           Reflexes:  Normal DTR. Toes downgoing.        Sensory:  Normal to light touch                   Coordination:   Intact finger-to-nose        Gait:  Up with assistance  Neck: no nuchal rigidity, normal thyroid. No carotid bruits.    Cardiovascular: Regular rate and rhythm, no m/r/g  Lungs: Clear to auscultation  Abdomen: Soft, not tender, not distended  Extremities: No clubbing, no cyanosis, no edema    Data  ______________________________________________  All Data personally reviewed:       Labs:   CBC RESULTS:     Recent Labs  Lab 17  1406   WBC 7.9   RBC 4.01   HGB 12.0   HCT 34.9*        Basic Metabolic Panel:   Recent Labs   Lab Test  17   1406  16   1118  06/22/15   1053   NA  140  141  142   POTASSIUM  4.1  4.2  4.4   CHLORIDE  107  105  105   CO2  26  25  29   BUN  20  16  13   CR  0.73  0.80  0.80   GLC  119*  115*  82   SAMIR  8.6  9.5  9.2     Liver panel:  Recent Labs   Lab Test  17   1406  16   1118  06/22/15   1053  14   1517  04/21/10   0758    PROTTOTAL  6.6*  7.1  6.7*  7.7   --    ALBUMIN  3.5  4.0  4.1  4.5   --    BILITOTAL  0.4  0.3  0.7  0.4   --    ALKPHOS  93  94  72  89   --    AST  18  21  20  25   --    ALT  15  18  16  18  18     Lipid Profile:  Recent Labs   Lab Test  06/22/15   1053  04/21/10   0758   CHOL  210*  195   HDL  55  47*   LDL  140*  122   TRIG  73  128   CHOLHDLRATIO  3.8  4.1     Thyroid Panel:  Recent Labs   Lab Test  01/23/17   1406  01/17/17   1340  05/02/16   1118  04/15/16   1047  06/22/15   1053   08/18/10   1510   01/12/10   1128  10/30/09   0923   TSH  <0.01*  <0.01*  <0.02  Reviewed: OK with previous  *  <0.01*  24.67*   < >  0.17*   < >  8.56*  7.55*   T4  1.52*  1.72*   --    --    --    --   1.32   --   1.08  0.91    < > = values in this interval not displayed.      Vitamin B12:   Recent Labs   Lab Test  05/02/16   1118  06/22/15   1053   B12  363  386      Troponin I:   Recent Labs   Lab Test  01/24/17   0805  01/23/17   2155  01/23/17   1755  01/23/17   1406   TROPI  0.190*  0.340*  0.308*  0.269*     UA Results:  Recent Labs   Lab Test  01/24/17   0750  01/23/17   1608   COLOR  Light Yellow  Yellow   APPEARANCE  Slightly Cloudy  Clear   URINEGLC  Negative  Negative   URINEBILI  Negative  Negative   URINEKETONE  Negative  Negative   SG  1.009  1.010   UBLD  Negative  Negative   URINEPH  6.5  7.0   PROTEIN  Negative  Negative   UROBILINOGEN   --   0.2   NITRITE  Negative  Negative   LEUKEST  Large*  Moderate*   RBCU  3*  O - 2   WBCU  >182*  5-10*     Most Recent 6 Bacteria Isolates From Any Culture (See EPIC Reports for Culture Details):  Recent Labs   Lab Test  05/02/16   1154  12/18/14   1617   CULT  >100,000 colonies/mL mixed urogenital natacha  Susceptibility testing not routinely done    10,000 to 50,000 colonies/mL mixed urogenital natacha          Imaging:   All imaging studies were reviewed personally  CT head 1/23/17:   --Diffuse cerebral volume loss and cerebral white matter changes consistent with chronic  small vessel ischemic disease. No evidence for acute intracranial pathology.        Shanta Wiggins, FNP-BC

## 2017-01-24 NOTE — PROVIDER NOTIFICATION
At 14:15 pt walked to BR with neuro intact. At 1430 pt noticed to have left facial droop, slow speech. No weakness, could follow commands, Could tell me her birthdate and name. This information was text to Dr. Perales. At the same time Dr. Vargas is on the floor and saw the pt. Pt's BP was 180SBP earlier in day now is 130SBP. Fluids ordered. Carotid U/S ordered.  Also to note, pt had an MRI earlier in the day that was negative for anything acute. Pt made NPO.  Also to note, pt with UTI. Will continue to monitor pt.

## 2017-01-24 NOTE — PLAN OF CARE
"Problem: Goal Outcome Summary  Goal: Goal Outcome Summary  Initial evaluation completed and treatment initiated.  Pt is a 89 y.o. female who presented with confusion and hallucinations.  Pt has a hx of mild to moderate memory impairment at baseline.  Pt lives alone in an independent living apartment and and was Bri/IND with mobility reporting she uses a 4WW mainly to carry things on the seat and out in the community in case she needs to sit.  Pt was IND with ADL's.  Pt is currently limited by cognitive impairments and fatigue.  Pt oriented to person and place but not month or year stating \"February 20 something.\"  During conversation, pt made several statements that indicate confusion and is easily distractable.  Pt performed bed mobility and functional transfers with SBA and ambulated with close SBA with difficulty maintaining straight path as she veers to the left and reaches for hallway rail.  Recommend pt discharge home with 24 hour assist/supervision due to cognitive impairments.                                  "

## 2017-01-24 NOTE — PROVIDER NOTIFICATION
Spoke with Dr. Vargas again. Pt with significant left facial droop, dysarthria, drooling. MRI DWI only ordered.    Also text Dr. Aguilar with update. He has no other orders.

## 2017-01-24 NOTE — PLAN OF CARE
Problem: Goal Outcome Summary  Goal: Goal Outcome Summary  Outcome: Improving  Neuros intact except for confusion and memory loss, especially surrounding events leading up to her admission to the hospital. NIH =2. Pleasant, cooperative. Up with 1 to BR. Fall risk. Regular diet. Tele reading NSR, BP elevated but within parameters.

## 2017-01-25 NOTE — PLAN OF CARE
Problem: Goal Outcome Summary  Goal: Goal Outcome Summary  SLP:  Bedside swallow evaluation orders will be completed as Pt has had a change in status to Comfort Cares.

## 2017-01-25 NOTE — PROGRESS NOTES
"Pt had acute change to speech, swallow and L facial droop, drooling from L side mouth starting on previous shift. Confirmed stroke noted on MRI by neurology. Pt not candidate for TPA. Echo shows EF 28%. IVF infusing at 75mL/hr. Dr. Ware called earlier stating that he does not want IVF any faster d/t EF. Upon discussion with daughter after she has spoken with Dr. Vargas, her brother and the patient, they are not wanting to pursue with aggressive measures. Patient stating she is ok with dying and that she \"just didn't think this would be the way it would go\". Informed daughter that there would be a more formal discussion with Dr. Ware in the morning about the goals of care and proceeding from there. She understood and also shared info that if anything were to happen \"Mom has donated her body to science.\" Pt has shown some agitation and confusion that waxes and wanes, currently resting in bed comfortably.   "

## 2017-01-25 NOTE — PLAN OF CARE
Problem: Goal Outcome Summary  Goal: Goal Outcome Summary  Outcome: No Change  Pt came in d/t episode of confusion; CVA on 1/24/17 and recent cardiac event within past week, positive UTI and over treated thryroid. Pt on comfort cares as of today. A/D to time, forgetful. Neuros include left facial droop, slurred speech, generalized weakness and difficulty swallowing.  CMS intact. LS clear. BSx4.  Voiding spontaneously. Denies pain. Up with 1 assist and GB. Diet as tolerated by pt; pt requesting speech evaluation for swallowing tips. VSS except HTN, Tele is SD with 1st degree AVB and BBB. Family at bedside and supportive in cares. Continue to monitor and follow POC.

## 2017-01-25 NOTE — PLAN OF CARE
Problem: Goal Outcome Summary  Goal: Goal Outcome Summary    OT/PT: Attempted OT eval this AM; however, therapist was informed by RN and family, pt's goal is to now focus on comfort care. RN advised OT/PT to Cx evals and complete therapy orders.    Note above, orders completed.

## 2017-01-25 NOTE — PLAN OF CARE
Problem: Goal Outcome Summary  Goal: Goal Outcome Summary  Outcome: No Change  A&Oriented to self. Confuse. Left facial droop, slurred speech, generalize weakness, difficulty swallowing. High blood pressure. NPO diet. Up with 1 with belt. Denies pain. Bedside sitter due to confusion. Plan transferring to [facility]. Gave report to receiving nurse.

## 2017-01-25 NOTE — PROGRESS NOTES
[facility]    Hospitalist Progress Note    Date of Service (when I saw the patient): 01/25/2017    Assessment and Plan   Ms. Enoc Kahn is a 89-year-old  lady with a past medical history notable for mild to moderate memory loss, hypothyroidism and recent history of hallucinations who has presented with acute confusion.      1. Acute ischemic CVA:  -patient initially admitted for a delirium  -yesterday afternoon developed significant left-sided facial droop, dysarthria and drooling so an MRI was done  -MRI showed acute infarct in the lateral inferior right frontal lobe and anterior right insula and the right middle cerebral artery territory  -started on rectal aspirin 300 mg daily as patient also has dysphagia  -had a discussion with the patient and her daughter, POA, at the bedside this morning, they both concurred that the patient wants to be comfort care only  -Requested palliative care consult    2. New-onset systolic cardiomyopathy likely due to recent NSTEMI:  -patient had troponin done in the emergency room which was elevated with peak at 0.3  -patient does have ischemic changes on her EKG on anterolateral lead  -echocardiogram done showed an EF of 28%with moderate to severe global hypokinesia of the left ventricle with septal akinesis and severe inferior wall hypokinesis  -Likely the patient sustained an ischemic event within the last few days; she however denies any cardiac symptoms like chest pain or dyspnea  -Again had a discussion with the daughter and the patient; she does not want anything aggressive or invasive like coronary angiogram or PCI or even medical management  -comfort cares as above    3.  Acute delirium:    -this was her initial presentation  -likely multifactorial due to cardiac event/UTI/CVA/overcorrected hypothyroidism    4. Hypothyroidism; overcorrected:   -The patient has a history of hyperthyroidism treated with thyroid ablation several years ago with  subsequent development of acquired hypothyroidism.    -Her recent TSH was less than 0.01 on 01/17/2017 and the dose of levothyroxine was reduced to 75 mcg p.o. daily by her primary care provider.    -At presentation TSH is still less than 0.01  -levothyroxine discontinued    5. Mild to moderate memory impairment:    -Patient apparently has mild to moderate memory impairment at baseline  -apparently getting worse in the recent past    6.  Hypertension:    -The blood pressure in the Emergency Room was elevated at 180/48.    -She has no previous history of hypertension.      6. UTI  -UA suggestive of UTI  -continue Rocephin 1 g IV daily, the daughter wants abx continued as she wants to make sure that UTI is not causing her discomfort    D/W: RN  DVT Prophylaxis: Pneumatic Compression Devices  Code Status: DNR/DNI    Disposition: Expected discharge pending palliative care and  evaluation    Tomy Aguilar MD    Interval History  Patient had new neurological symptoms yesterday afternoon, workup revealed new acute ischemic stroke.  Intermittently confused yesterday afternoon but has been calm throughout the night and this morning    -Data reviewed today: I reviewed all new labs and imaging results over the last 24 hours. I personally reviewed the EKG tracing showing nsr.    Physical Exam  Temp: 98.1  F (36.7  C) Temp src: Axillary BP: 150/79 mmHg Pulse: 99   Resp: 16 SpO2: 94 % O2 Device: None (Room air) Oxygen Delivery: 2 LPM  There were no vitals filed for this visit.  Vital Signs with Ranges  Temp:  [97.3  F (36.3  C)-98.1  F (36.7  C)] 98.1  F (36.7  C)  Pulse:  [70-99] 99  Resp:  [12-18] 16  BP: (138-162)/(61-85) 150/79 mmHg  SpO2:  [94 %-100 %] 94 %  I/O last 3 completed shifts:  In: 1411 [P.O.:480; I.V.:931]  Out: 200 [Urine:200]    Constitutional: AAOX2, NAD, answers simple questions very appropriately  HEENT: Moist oral mucosa, no oral lesions, No pallor or icterus  Neck- Supple, Good ROM, No  JVD  Respiratory:  No crackles, No wheezes, CTA B/L, Normal WOB  Cardiovascular: RRR, No murmur  GI: Soft, Non- tender, BS- normoactive, No Guarding/rebound/rigidity  Skin/Integument: Warm and dry, no rashes  MSK: No joint deformity or swelling, no edema  Neuro: left facial droop, slurred speech, moves all four appropriately     Medications    NaCl 75 mL/hr at 01/25/17 0715       cefTRIAXone  1 g Intravenous Q24H       Data    Recent Labs  Lab 01/24/17  0805 01/23/17  2155 01/23/17  1755 01/23/17  1406   WBC  --   --   --  7.9   HGB  --   --   --  12.0   MCV  --   --   --  87   PLT  --   --   --  310   NA  --   --   --  140   POTASSIUM  --   --   --  4.1   CHLORIDE  --   --   --  107   CO2  --   --   --  26   BUN  --   --   --  20   CR  --   --   --  0.73   ANIONGAP  --   --   --  7   SAMIR  --   --   --  8.6   GLC  --   --   --  119*   ALBUMIN  --   --   --  3.5   PROTTOTAL  --   --   --  6.6*   BILITOTAL  --   --   --  0.4   ALKPHOS  --   --   --  93   ALT  --   --   --  15   AST  --   --   --  18   TROPI 0.190* 0.340* 0.308* 0.269*       Recent Results (from the past 24 hour(s))   MR Brain w/o & w Contrast    Narrative    MRI BRAIN WITHOUT AND WITH CONTRAST January 24, 2017 12:09 PM    HISTORY: Confusion.     TECHNIQUE: Multiplanar, multisequence MRI of the brain without and  with 6mL Gadavist.    COMPARISON: 1/23/2017.    FINDINGS: Mild to moderate cerebral atrophy is present. There is a  focal signal abnormality in the right frontal lobe with volume loss  most likely due to an old infarct or an old area of encephalomalacia.  There is no evidence for any acute infarct or any intracranial  hemorrhage. Scattered nonspecific white matter changes are also  present in both hemispheres without mass effect. Vascular structures  are patent at the skull base. Postcontrast images do not show any  abnormal areas of enhancement or any focal mass lesions.      Impression    IMPRESSION: Chronic changes. No evidence for  intracranial hemorrhage  or any acute process.    JORDYN COLON MD   MR Brain w/o Contrast    Narrative    MRI BRAIN WITHOUT CONTRAST  1/24/2017 4:55 PM    HISTORY: DWI only to rule out acute stroke.    TECHNIQUE: Axial diffusion weighted image and an ADC map MRI of the  brain without gadolinium IV contrast material.    COMPARISON: 1/24/2017 at 11:28 AM    FINDINGS: Since this morning's exam the patient has developed an acute  infarct in the right middle cerebral artery territory involving the  inferior lateral right frontal lobe cortex and the anterior right  insula. This shows limited diffusion. The area of encephalomalacia and  gliosis and T2 shine through in the right frontal lobe noted on the  earlier exam today is unchanged. No other acute infarct is visible.      Impression    IMPRESSION: Interval development of an acute infarct in the lateral  inferior right frontal lobe and anterior right insula in the right  middle cerebral artery territory.    EFRA JIMENEZ MD   US Carotid Bilateral    Narrative    BILATERAL DUPLEX CAROTID ULTRASOUND 1/24/2017 5:12 PM     HISTORY: Stroke with left facial droop.    COMPARISON: None.    FINDINGS:  There is mild atherosclerotic plaque in the carotid  bifurcations.  Flow velocities and waveforms show less than 50%  diameter stenosis in both the right and left internal carotid arteries  as assessed by each ICA PSV and EDV and ICA/DCCA ratio.  Antegrade  flow is present in both vertebral and external carotid arteries.      Impression    IMPRESSION:  Less than 50% diameter stenosis of both internal carotid  arteries relative to the distal ICA diameters.       EFRA JIMENEZ MD    will

## 2017-01-25 NOTE — PROGRESS NOTES
Pt awoke from sleep confused, agitated, anxious. Telling NA that was sitter with her to get out of her house. Pulling at lines/tele box, climbing out of bed. Difficult to redirect. Agreed to go to toilet. Received one time dose of haldol IV 1mg, administered. Now back in bed.

## 2017-01-25 NOTE — PROGRESS NOTES
SPIRITUAL HEALTH SERVICES Progress Note  [facility]    Brief introductory visit with pt's daughter (I believe).  She declined offer to visit.  SH team is available if needs arise.                                                                                                                                                 Sara Brito  Staff   Pager [phone]

## 2017-01-25 NOTE — PROVIDER NOTIFICATION
MD Notification    Notified Person:  MD    Notified Persons Name: Dr. Alcaraz    Notification Date/Time: 1/24/17 5766    Notification Interaction:  Paged/talked with Physician    Purpose of Notification: Increased confusion, difficult to redirect/keep in bed. Pulling at tele and IV lines. Unable to give PO seroquel.    Orders Received: One time dose haldol.    Comments:

## 2017-01-25 NOTE — PROVIDER NOTIFICATION
Updated Dr. Ware that Dr. Vargas had spoken with pt's family and that they declined TPA. Conveyed to him questions from Dr. Vargas regarding whether cardiology should be consulted today or tomorrow for elevated troponins and abnormal EKG and also to please advise on IVF rate. Dr. Ware stated he will round tomorrow morning early to speak with family before involving cardiology. He also stated that IVF should continue at NS at 75 mL/hr. Order placed for full strength ASA rectal or oral to start tomorrow morning.

## 2017-01-25 NOTE — CONSULTS
"[facility]  Palliative Care Consultation    Enoc Taylor MRN# [id number]   Age: 89 year old YOB: 1924   Date of Admission: 1/23/2017  Date of Service:  January 25, 2017  Reason for consult: Goals of care       Requesting physician/service: Dr Alvarado/Hospitalist       Orders & Recommendations      - Family considering discharge with hospice; hospice liaison RN will meet with pt/family for informational meeting. SW sorting through placement options  - Her UA indicated infection, culture >100K mixed natacha.  While it seems important to avoid discomfort from UTI, it also seems important to help her transition out of the hospital while she has the strength to do so. Would it be appropriate to give her 3rd dose Rocephin tomorrow and end treatment of uncomplicated UTI at that time (or continue oral if needed as tolerated)?  - Family had questions about need for speech eval.  We recommended eat for comfort as tolerated if they continue a comfort focused pathway, family is considering whether they want speech to see for \"tips on swallowing\".  - She's having generalized MSK aches, probably from being bedridden at this point. She doesn't want scheduled APAP at this time, but make available prn    - If she does go ahead with hospice enrollment, please write for the following hospice meds at discharge:      - Lorazepam elixir 0.5-1 mg q 6 hours prn restlessness/anxiety  - Bisacodyl 10 mg Suppository OR daily to bid prn constipation  - Tylenol 650 mg suppository PO/OR q6hr prn pain, fever  - Atropine sulfate 1% opth solution 1-2 drops SL q2h prn secretions  - Senna 8.6 mg 1-2 tabs PO prn constipation  - Haldol 1-2 mg PO/SL/OR q6h prn nausea, agitation or delirium.    - Roxanol oral morphine elixir 2.5-5 mg up to q 4 hours prn pain/dyspnea    Chepe Minor MD  Palliative Medicine Consult Team  Pager: [phone]   TT: 70 minutes, with > 50% spent in C/C/E patient/family/care teams re: GOC, POC, Sx " management. [id number]      Disease Process/es   89-year-old woman with NSTEMI, new ischemic CVA.  New ischemicCVA  NSTEMI  Delirium  Hx mild-moderate cognitive impairment  Symptoms     Generalized MSK pain, likely 2/2 decreased mobility        Support/Coping   Her daughter and granddaughter are at bedside     Decision-Making & Goals of Care     Discussion/counseling today about goals of care/decisions:   Met with pt/family. Introduced the scope of our practice. Discussed our potential roles in symptom management, support/coping, and decisional support (aka goals of care)  Patient has decision-making capacity (Y/N): Yes  Health care directive: Invalid document on chart  Surrogate Decision-Maker (include rationale): Daughter  Code Status: DNR/DNI   Physician orders for life-sustaining treatment (POLST) form is not completed    Coordination of Care     Findings & plan of care discussed with: Primary team    Thank you for involving us in the care of this patient and family. We will continue to follow. Most patients are seen by a member of our Palliative team daily Monday through Friday. An on-call MD is available for urgent issues Saturday & Sunday.     Please do not hesitate to contact me with questions or concerns (or the on-call provider for our team if evening or weekend).    Chepe Minor MD  Palliative Medicine Consult Team  Pager: [phone]     Total Time: 75  Counseling & Coordination Time: 60 minutes  [id number]          Chief Complaint     GOC planning in setting of new NSTEMI and CVA         History of Present Illness     89 y/o womanwith a past medical history notable for mild to moderate memory impairment, hypothyroidism, and chronic rhinitis who currently resides in a senior living facility independently.  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 Rios in , pap with ascus and positive HPV (58 & 81)- pt declines colpo,               Past Surgical History:   Past Surgical History   Procedure Laterality Date     Surgical history of -        cyst removal from lower lip     C appendectomy       Surgical history of -   ?     cyst removal, rt shoulder     Tonsillectomy       Rotator cuff repair rt/lt       Surgical history of -        excess bone removed from L foot     Surgical history of -        repair of foot arch, Lt     Cataract iol, rt/lt       Rt eye     C thyroid ablation                 Social/Spiritual History:     [place], daughters involved.  Worked in real estate and clerical.            Family History:   Both parents            Allergies:   No Known Allergies           Medications:   I have reviewed this patient's medication profile and medications during this hospitalization           Review of Systems:   The comprehensive review of systems is negative other than noted here and in the HPI.  + only for weakness, generalized MSK (mild) pain  Remainder complete palliative ROS negative     Physical Exam:  VITALS: Blood pressure 161/77, pulse 88, temperature 97.6  F (36.4  C), temperature source Axillary, resp. rate 16, SpO2 96 %, not currently breastfeeding.  GEN: Awake, alert, interactive; slurred speech  EYES: Sclera non-icteric  EARS: Eumorphic bilaterally  NOSE: No significant nasal drainage  MOUTH: Oral mucosa moist, no evidence of thrush  LUNGS: No increased WOB or accessory muscle use  CV: Regular radial pulse 90  EXTR: 1+LE edema  SKIN: Warm, dry  NEUROPSYCH: Engaged, coherent thought process              Data Reviewed:      ROUTINE ICU LABS (Last four results)  CMP  Recent Labs  Lab 01/23/17  1406      POTASSIUM 4.1   CHLORIDE 107   CO2 26   ANIONGAP 7   *   BUN 20   CR 0.73   GFRESTIMATED 75   GFRESTBLACK >90African American GFR Calc   SAMIR 8.6   PROTTOTAL 6.6*   ALBUMIN 3.5   BILITOTAL 0.4   ALKPHOS 93   AST 18   ALT 15     CBC  Recent Labs  Lab 01/23/17  1406   WBC 7.9   RBC 4.01   HGB 12.0   HCT 34.9*   MCV 87   MCH 29.9   MCHC 34.4   RDW 12.9        INRNo lab results found in last 7 days.  Arterial Blood GasNo lab results found in last 7 days.

## 2017-01-25 NOTE — PROGRESS NOTES
Palliative Care Inpatient Clinical Social Work Assessment    Patient Information:  Enoc Taylor is a 89 year old woman who was admitted for delirium on 1/23/17. While hospitalized she developed significant left-sided facial droop, and drooling. Work up showed recent NSTEMI, and stroke (see hospitalist note for full summary). Palliative Care Team consulted for support with goals of care conversations.     Relevant Symptoms/Concerns/Strengths     Physical:  No symptoms noted in today's visit. Enoc reported she was not in pain, and had no concerns about other symptoms such as anxiety or other symptoms   Psychological/Emotional/Existential:  Family appeared saddened, but were also very open to discussing end of life goals of care.    Family/Social/Caregiver:   Enoc's daughter and granddaughter were at bedside and are involved and supportive.   Developmental:     Mental Health:     End of Life:  Enoc was very clear that her wish at this time is for comfort-focused cares at d/c. Family is hoping to meet with hospice for an informational visit tomorrow.    Cultural/Christian/Spiritual:     Grief/Loss:     Concurrent Stressors:       Comments:            Comments:      Goals/Decision Making/Advance Care Planning   Preferences:  Comfort focused   Concerns:  Enoc was able to appropriately participate in conversation. She kept her eyes closed for much of our discussion, but often nodded to indicate agreement with discussion of comfort cares vs restorative / rehab -focused cares   Documents:  Family referenced a health care directive which clearly lays out Enoc's wishes. There is no copy in the chart at this time.    Decision Making Issues:  Enoc was able to participate appropriately with support from family in yeterday's discussion     Comments:      Resource Needs     Discharge Planning:  Per Unit/Program  and/or Care Coordinator   Other:     Comments:      Sources of Information   Patient:  x   Family:   x   Staff:  x   Chart Review:  x   Other:      Intervention (Check all that apply)    x   Assessment of palliative specific issues      x   Introduction of Palliative clinical social work interventions    x   Adjustment to illness counseling       Advanced care planning       Attended/participated in care conference       Behavioral interventions for symptom management    x   Facilitation of processing of thoughts/feelings    x   Family communication facilitated       Grief counseling    x   Goals of care discussion/facilitation       Life legacy work       Life review facilitation       Psychoeducation       Re-framing       Resource referral: Social Work/ Hospice       Other:       Comments:      Plan:  Will continue to be available as needed for support with goals of care conversations    GEORGE Rudd, [facility]   Palliative Care    Pgr:[phone]  Ph: [phone]

## 2017-01-25 NOTE — PLAN OF CARE
Problem: Goal Outcome Summary  Goal: Goal Outcome Summary  Outcome: No Change  R MCA CVI. A/O to self and place only, slurred speech, L facial droop. CMS intact. LS clear. BSx4, voiding. Denies pain. Up with 1 assist and gait belt. Regular diet. VSS, Tele is SD with 1st degree AVB, BBB and PACs. Discharge pending.

## 2017-01-26 NOTE — PROGRESS NOTES
[facility]    Hospitalist Progress Note    Date of Service (when I saw the patient): 01/26/2017    Assessment and Plan   Ms. Enoc Kahn is a 89-year-old  lady with a past medical history notable for mild to moderate memory loss, hypothyroidism and recent history of hallucinations who has presented with acute confusion.      1. Acute ischemic CVA:  -patient initially admitted for a delirium  -MRI showed acute infarct in the lateral inferior right frontal lobe and anterior right insula and the right middle cerebral artery territory  -had a discussion with the patient and her daughter, POA, 1/25, they both concurred that the patient wants to be comfort care only  -Appreciate palliative care consult  - consult for hospice    2. New-onset systolic cardiomyopathy likely due to recent NSTEMI:  -patient had troponin done in the emergency room which was elevated with peak at 0.3  -patient does have ischemic changes on her EKG on anterolateral lead  -echocardiogram done showed an EF of 28%with moderate to severe global hypokinesia of the left ventricle with septal akinesis and severe inferior wall hypokinesis  -Likely the patient sustained an ischemic event within the last few days; she however denies any cardiac symptoms like chest pain or dyspnea  -Again had a discussion with the daughter and the patient; she does not want anything aggressive or invasive like coronary angiogram or PCI or even medical management  -comfort cares as above    3.  Acute delirium:    -this was her initial presentation  -likely multifactorial due to cardiac event/UTI/CVA/overcorrected hypothyroidism    4. Hypothyroidism; overcorrected:   -The patient has a history of hyperthyroidism treated with thyroid ablation several years ago with subsequent development of acquired hypothyroidism.    -Her recent TSH was less than 0.01 on 01/17/2017 and the dose of levothyroxine was reduced to 75 mcg p.o. daily by her  primary care provider.    -At presentation TSH is still less than 0.01  -levothyroxine discontinued    5. Mild to moderate memory impairment:    -Patient apparently has mild to moderate memory impairment at baseline  -apparently getting worse in the recent past    6.  Hypertension:    -The blood pressure in the Emergency Room was elevated at 180/48.    -She has no previous history of hypertension.      6. UTI  -UA suggestive of UTI  -Discontinue Rocephin after today's dose       D/W: RN  DVT Prophylaxis: Pneumatic Compression Devices  Code Status: DNR/DNI    Disposition: Expected discharge pending hospice evaluation    Tomy Aguilar MD    Interval History  No acute events per nursing report.  Patient denies new complaints other than bilateral knee pain    -Data reviewed today: I reviewed all new labs and imaging results over the last 24 hours. I personally reviewed the EKG tracing showing nsr.    Physical Exam  Temp: 97.9  F (36.6  C) Temp src: Oral BP: 165/79 mmHg Pulse: 93   Resp: 16 SpO2: 97 % O2 Device: None (Room air)    There were no vitals filed for this visit.  Vital Signs with Ranges  Temp:  [97.6  F (36.4  C)-97.9  F (36.6  C)] 97.9  F (36.6  C)  Pulse:  [88-93] 93  Resp:  [16] 16  BP: (161-165)/(77-79) 165/79 mmHg  SpO2:  [96 %-97 %] 97 %  I/O last 3 completed shifts:  In: 1730 [I.V.:1730]  Out: -     Constitutional: AAOX2, NAD, answers simple questions very appropriately  Respiratory:  No crackles, No wheezes, Normal WOB  Cardiovascular: RRR, No murmur  GI: Soft, Non- tender, BS- normoactive, No Guarding/rebound/rigidity  Skin/Integument: Warm and dry, no rashes  MSK: No joint deformity or swelling, no edema  Neuro: left facial droop, slurred speech, moves all four appropriately     Medications       cefTRIAXone  1 g Intravenous Q24H       Data    Recent Labs  Lab 01/24/17  0805 01/23/17  2155 01/23/17  1755 01/23/17  1406   WBC  --   --   --  7.9   HGB  --   --   --  12.0   MCV  --   --   --  87    PLT  --   --   --  310   NA  --   --   --  140   POTASSIUM  --   --   --  4.1   CHLORIDE  --   --   --  107   CO2  --   --   --  26   BUN  --   --   --  20   CR  --   --   --  0.73   ANIONGAP  --   --   --  7   SAMIR  --   --   --  8.6   GLC  --   --   --  119*   ALBUMIN  --   --   --  3.5   PROTTOTAL  --   --   --  6.6*   BILITOTAL  --   --   --  0.4   ALKPHOS  --   --   --  93   ALT  --   --   --  15   AST  --   --   --  18   TROPI 0.190* 0.340* 0.308* 0.269*       No results found for this or any previous visit (from the past 24 hour(s)). will

## 2017-01-26 NOTE — PLAN OF CARE
Problem: Discharge Planning  Goal: Discharge Planning (Adult, OB, Behavioral, Peds)  Patient's goal is to d/c with [facility]

## 2017-01-26 NOTE — PROGRESS NOTES
Care Transition Initial Assessment - SW  Reason For Consult: end of life/hospice  Met with: PATIENT,FAMILY   Active Problems:    Delirium    General weakness         DATA  Lives With: alone  Living Arrangements: apartment, independent living facility  Description of Support System: Supportive, Involved  Who is your support system?: Children, Other (specify) (granddaughter)  Support Assessment: Lacks necessary supervision and assistance, Caregiver difficulty providing physical care/safety.   Identified issues/concerns regarding health management:patient/family considering hospice at d/c  Patient feels that they have adequate support @ home?  YES  Resources List: Hospice  Quality Of Family Relationships: supportive, involved, helpful  Transportation Available: family or friend will provide    ASSESSMENT  Cognitive Status: Oriented to self  Concerns to be addressed: patient is a 89 year old female who was admitted to the hospital for delirium. Prior to hospitalization patient was living at [facility]. Family is aware that patient would not be able to go back to [facility] with hospice due to lack of assistance. Patient's family would like patient to go to LTC bed vs hospice home. Patient's family would like a hospice consult with  Hospice. Jane from  Hospice is available this morning and will meet with patient, daughter and granddaughter. SW will meet [facility] family and patient after consult. Patient's family is aware that LTC/Hospice home is private pay.      PLAN  Patient Goals and Preferences: D/C with  hospice  Patient anticipates discharging to:  D/C with  hospice    CATARINA Pal   *[phone]      ADDENDUM  I: CARMELO was updated that patient would like to go to [facility] with hospice. SW will need to check if this is a feasible option. Patient is also a [place]. SW will check to see if patient hs VA benefits for VA facility with hospice at d/c. SW called and spoke with VA  staff. Staff stated that she does not currently have benefits but could apply for them by filling out [id number] form and submitting  (discharge orders form ) to [phone]. Patient may have to private pay for a short time at a VA contracted facility on hospice until benefits are active. Patient will also like SW to look into LTC beds if [facility] is not an option. CARMELO called [facility]. Patient is currently in Independent Living, [facility] ([phone]) stated they could increase services but if patient needs assist with transfers they would need to move patient down to [facility]. [facility]lee stated they can accept patient back on hospice and are ok with Elderly waiver when patient is ready to transition to [place]. CARMELO updated family about all options. CARMELO awaits a decision from family.    ADDENDUM  I: Family is going to to look at [facility] and will confirm with SW if this is the route they would like to take. CARMELO awaits confirmation.      ADDENDUM  I: CARMELO spoke with patient's family and they stated patient cannot go to [facility] and would like a LTC bed. CARMELO provided them an anticipated bed availability list for LTC facilities. Patient's granddaughter is reviewing with family and will give SW choices.    Patient's family would like a referral sent to [facility] for LTC w/ Hospice. CARMELO faxed referral via DOD.

## 2017-01-26 NOTE — PROGRESS NOTES
Hospice: Met with pt, daughter Marquis and granddaughter Dhara to explain hospice services.Marquis tells me her mother has been at [facility] for a few months and before this lived independently in her own apt and had to move to an [facility] after being diagnosed with toxic encephalopathy. She is currently hospitalized for elevated thyroid, uti and while hospitalized she developed significant left-sided facial droop, and drooling. Work up showed recent NSTEMI, and stroke. The ideal plan would be to go back to [facility] if they can acommodate her level of care and MA funding. The other options would be a skilled nursing facility that takes MA. Pt is also a /[employer] and could maybe qualify for VA contracted nursing home with hospice care. I explained the medicare hospice benefit, services available for support, medication and equipment coverage. No hospice consent forms signed today. See Dr Minor's note for hospice meds recommendations at discharge. When a facility is determined for discharge I will meet with the daughter and pt again to sign consents and help with the discharge planning. Thank you for the referral. Jane Kowalski RN, BSN  [facility] Admission nurse  [phone]

## 2017-01-26 NOTE — PLAN OF CARE
Problem: Goal Outcome Summary  Goal: Goal Outcome Summary  Outcome: No Change  Transfer from 73. Comfort cares. Alert to self only. Confused. Intermittently pulling at lines and restless. Haldol given x1. Sitter present at bedside. No pain. Oral cares done. Family asking about speech eval? Sticky note left. Up with assist of 1 and GB. SW consulted for hospice information. Continue to provide supportive care.

## 2017-01-26 NOTE — PLAN OF CARE
Problem: Goal Outcome Summary  Goal: Goal Outcome Summary  Outcome: No Change  Comfort cares. Oriented to self. Confused. Denies pain. Slept most of shift. Up with 1, gb. Will continue to monitor.

## 2017-01-26 NOTE — PLAN OF CARE
Problem: Goal Outcome Summary  Goal: Goal Outcome Summary  SLP: Bedside swallow eval completed per MD orders. Pt presents with minimal oropharyngeal dysphagia s/p CVA. Pt on comfort cares but pts family requesting eval to assess safety of swallow. Pt with mild left facial droop. Pt tolerated all PO trials with no overt s/sx of aspiration. Pt required mildly prolonged time in oral phase however no s/sx of aspiration noted across trials. Recommend regular textures and thin liquids. Pt should be fully alert and upright for all PO, take small single sips/bites, alternate consistencies, and pace self. Pills to be served in small amount of puree. Educated provided to pt and family on strategies to increase safety of swallow as pt is expected to d/c on hospice in next 1-2 days. No further ST needs indicated. Will complete ST orders.

## 2017-01-26 NOTE — PROGRESS NOTES
01/26/17 1418   General Information   Onset Date 01/23/17   Start of Care Date 01/26/17   Referring Physician Tomy Aguilar MD   Patient Profile Review/OT: Additional Occupational Profile Info See Profile for full history and prior level of function   Patient/Family Goals Statement Pt would like to eat and drink   Swallowing Evaluation Bedside swallow evaluation   Behaviorial Observations Lethargic   Mode of current nutrition NPO   Respiratory Status Room air   Comments Ms. Enoc Kahn is a 89-year-old  lady with a past medical history notable for mild to moderate memory loss, hypothyroidism and recent history of hallucinations who has presented with acute confusion.  Pt is on comfort cares but family requesting ST eval to assess safety of swallow   Clinical Swallow Evaluation   Oral Musculature generally intact   Structural Abnormalities none present   Dentition present and adequate   Mucosal Quality dry   Mandibular Strength and Mobility intact   Oral Labial Strength and Mobility impaired pursing   Lingual Strength and Mobility impaired anterior elevation   Velar Elevation intact   Buccal Strength and Mobility intact   Laryngeal Function Cough;Throat clear;Swallow;Voicing initiated   Oral Musculature Comments Left sided facial droop   Additional Documentation Yes   Clinical Swallow Eval: Thin Liquid Texture Trial   Mode of Presentation, Thin Liquids spoon;cup;self-fed;fed by clinician   Volume of Liquid or Food Presented 4 oz   Oral Phase of Swallow WFL   Pharyngeal Phase of Swallow intact   Diagnostic Statement Pt tolerated thin liquids trials with no overt s/sx of aspiration    Clinical Swallow Eval: Puree Solid Texture Trial   Mode of Presentation, Puree spoon;self-fed;fed by clinician   Volume of Puree Presented 3 tbsp   Oral Phase, Puree WFL   Pharyngeal Phase, Puree intact   Diagnostic Statement Pt tolerated pureed textures with no overt s/sx of aspiraiton    Clinical Swallow Eval: Semisolid  Texture Trial   Mode of Presentation, Semisolid spoon;fed by clinician   Volume of Semisolid Food Presented 2 tbsp   Oral Phase, Semisolid other (see comments)  (mildly prolonged time for mastication)   Pharyngeal Phase, Semisolid intact   Diagnostic Statement Pt require mlidly prolonged time for mastication and no overt s/sx of aspiration   Clinical Swallow Eval: Solid Food Texture Trial   Mode of Presentation, Solid self-fed   Volume of Solid Food Presented 1/2 cracker   Oral Phase, Solid other (see comments)  (prolonged time for mastication)   Pharyngeal Phase, Solid intact   Diagnostic Statement Pt required proloned but functional time for mastication and no overt s/sx of aspiration    VFSS Evaluation   VFSS Additional Documentation No   FEES Evaluation   Additional Documentation No   Swallow Compensations   Swallow Compensations Alternate viscosity of consistencies;Pacing;Multiple swallow;Reduce amounts   Results No difficulties noted   Esophageal Phase of Swallow   Patient reports or presents with symptoms of esophageal dysphagia No   General Therapy Interventions   Planned Therapy Interventions Dysphagia Treatment   Dysphagia treatment Instruction of safe swallow strategies;Compensatory strategies for swallowing   Intervention Comments 1 tx session provided following eval   Swallow Eval: Clinical Impressions   Skilled Criteria for Therapy Intervention Skilled criteria met.  Treatment indicated.   Functional Assessment Scale (FAS) 6   Treatment Diagnosis Minimal oropharyngeal dysphagia   Diet texture recommendations Regular diet;Thin liquids   Recommended Feeding/Eating Techniques alternate between small bites and sips of food/liquid;check mouth frequently for oral residue/pocketing;hard swallow w/ each bite or sip;maintain upright posture during/after eating for 30 mins;small sips/bites   Therapy Frequency other (see comments)  (1 tx session following eval)   Predicted Duration of Therapy Intervention  (days/wks) No further ST needs indicated beyond tx session following eval   Anticipated Discharge Disposition (hospice)   Risks and Benefits of Treatment have been explained. Yes   Patient, family and/or staff in agreement with Plan of Care Yes   Clinical Impression Comments SLP: Bedside swallow eval completed per MD orders. Pt presents with minimal oropharyngeal dysphagia s/p CVA. Pt on comfort cares but pts family requesting eval to assess safety of swallow. Pt with mild left facial droop. Pt tolerated all PO trials with no overt s/sx of aspiration. Pt required mildly prolonged time in oral phase however no s/sx of aspiration noted across trials. Recommend regular textures and thin liquids. Pt should be fully alert and upright for all PO, take small single sips/bites, alternate consistencies, and pace self. Pills to be served in small amount of puree. Educated provided to pt and family on strategies to increase safety of swallow as pt is expected to d/c on hospice in next 1-2 days. No further ST needs indicated. Will complete ST orders.    Total Evaluation Time   Total Evaluation Time (Minutes) 10

## 2017-01-27 NOTE — PROGRESS NOTES
[facility]    Hospitalist Progress Note    Date of Service (when I saw the patient): 01/27/2017    Assessment and Plan   Ms. Enoc Kahn is a 89-year-old  lady with a past medical history notable for mild to moderate memory loss, hypothyroidism and recent history of hallucinations who has presented with acute confusion.      1. Acute ischemic CVA:  -patient initially admitted for a delirium  -MRI showed acute infarct in the lateral inferior right frontal lobe and anterior right insula and the right middle cerebral artery territory  -had a discussion with the patient and her daughter, POA, 1/25, initially,they both wanted pt to comfort care only  -Pt improved in the last 48 hours, plans to reverse comfort measures now (1/27); but will continue palliative/hospice care  -Appreciate palliative care consult  -family would still prefer to DC to a facility with hospice    2. New-onset systolic cardiomyopathy likely due to recent NSTEMI:  -patient had troponin done in the emergency room which was elevated with peak at 0.3  -patient does have ischemic changes on her EKG on anterolateral lead  -echocardiogram done showed an EF of 28%with moderate to severe global hypokinesia of the left ventricle with septal akinesis and severe inferior wall hypokinesis  -Likely the patient sustained an ischemic event within the last few days; she however denies any cardiac symptoms like chest pain or dyspnea  -Pt and daughter do not want any Rx for cardiomyopathy- not even medical    Addendum: Family now want cadriomyopathy medication started.  Will start on aspirin 81 mg daily, Coreg 3.125 mg b.i.d., lisinopril 5 mg daily.    3.  Acute delirium:    -this was her initial presentation  -likely multifactorial due to cardiac event/UTI/CVA/overcorrected hypothyroidism  -Much improved    4. Hypothyroidism; overcorrected:   -The patient has a history of hyperthyroidism treated with thyroid ablation several years ago with  subsequent development of acquired hypothyroidism.    -Her recent TSH was less than 0.01 on 01/17/2017 and the dose of levothyroxine was reduced to 75 mcg p.o. daily by her primary care provider.    -At presentation TSH is still less than 0.01  -resume levothyroxine in 2-3 days at 50 mcg    5. Mild to moderate memory impairment:    -Patient apparently has mild to moderate memory impairment at baseline  -apparently getting worse in the recent past    6.  Hypertension:    -The blood pressure in the Emergency Room was elevated at 180/48.    -She has no previous history of hypertension.    -Monitor for now    6. UTI  -UA suggestive of UTI  -Completed 3 days of rocephin      D/W: RN  DVT Prophylaxis: Pneumatic Compression Devices  Code Status: DNR/DNI    Disposition: Expected discharge pending placement    Tomy Aguilar MD    Interval History  No acute events per nursing report. Pt much more lucid. Denies CP or dyspnea    -Data reviewed today: I reviewed all new labs and imaging results over the last 24 hours. I personally reviewed the EKG tracing showing nsr.    Physical Exam            Resp: 20        There were no vitals filed for this visit.  Vital Signs with Ranges  Resp:  [16-20] 20  I/O last 3 completed shifts:  In: 240 [P.O.:240]  Out: -     Constitutional: AAOX2, NAD, answers simple questions very appropriately  Respiratory:  No crackles, No wheezes, Normal WOB  Cardiovascular: RRR, No murmur  GI: Soft, Non- tender, BS- normoactive, No Guarding/rebound/rigidity  Skin/Integument: Warm and dry, no rashes  MSK: No joint deformity or swelling, no edema  Neuro: Speech better, moves all four appropriately     Medications          Data    Recent Labs  Lab 01/24/17  0805 01/23/17  2155 01/23/17  1755 01/23/17  1406   WBC  --   --   --  7.9   HGB  --   --   --  12.0   MCV  --   --   --  87   PLT  --   --   --  310   NA  --   --   --  140   POTASSIUM  --   --   --  4.1   CHLORIDE  --   --   --  107   CO2  --   --   --   26   BUN  --   --   --  20   CR  --   --   --  0.73   ANIONGAP  --   --   --  7   SAMIR  --   --   --  8.6   GLC  --   --   --  119*   ALBUMIN  --   --   --  3.5   PROTTOTAL  --   --   --  6.6*   BILITOTAL  --   --   --  0.4   ALKPHOS  --   --   --  93   ALT  --   --   --  15   AST  --   --   --  18   TROPI 0.190* 0.340* 0.308* 0.269*       No results found for this or any previous visit (from the past 24 hour(s)). will

## 2017-01-27 NOTE — PLAN OF CARE
Problem: Goal Outcome Summary  Goal: Goal Outcome Summary  Outcome: No Change  3089-2396 Shift note: Comfort cares. Alert but disoriented to time/situation. Sitter at bedside for safety d/t confusion/restlessness at times. Ibuprofen effective for right knee pain. Diet advanced to regular with thins per speech recs. Good appetite. Tolerated well. Up with assist of 1 to BR. Family reviewing d/c options; had informational meeting with hospice today. Continue to monitor.

## 2017-01-27 NOTE — PLAN OF CARE
Problem: Goal Outcome Summary  Goal: Goal Outcome Summary  Outcome: No Change  Comfort cares.  No acute changes overnight.  Impulsive, sitter at bedside.  Up with A1 to BR.  Denies pain.  D/c pending hospice decision.  Continue to monitor.

## 2017-01-27 NOTE — PROGRESS NOTES
SW  I: SW met with dtr and pt.  Pt's dtr upset as she feels she was not given adequate information about the discharge process.  SW listened to concerns and addressed options of LTC w/ hospice vs. [facility] w/ hospice/ and 24 hour caregiver, in pt's own apartment.  Dtr also concerned because pt will run out of money in the next couple months, and need to apply for MA.  SW informed process of applying for MA will be different when in LTC vs Community.  Pt's dtr does not want [facility] LTC as she has never toured and wants to discuss goals of care with her brother who is flying into [facility].  Dtr is meeting with a private pay person from [facility] today at 11:30am, as dtr feels she needs extra guidance in determining options.  SW explained to dtr that if pt returns to [facility], that dtr may want to place pt's name on LTC lists, as pt may need to transition to this.   A: Pt wants to return to [facility] with assistance needed, pt undecided about Hospice d/t what medications she may want to take to improve cardiac function.  Family to meet this evening/tomorrow, when pt's son arrives and determine plans and goals.  Pt's dtr agreeable to have SW send notes updated to [facility], as pt has improved since yesterday.    P: SW to follow.     I: added pt's gr. dtr to facesheet, as other relative is in [place] for a few weeks, and not reachable.  Left message for RN at [facility].  Updated [facility] that pt is undetermined about d/c plan, Noah from [facility] will hold onto referral until Monday.  Dtr updated.      I: Spoke with dtr and it was decided pt would like to go to [facility] TCU first to attempt therapies, and will start new medications.  Pt would then like to transfer to LTC, and possibly hospice after tcu stay.

## 2017-01-28 NOTE — PROGRESS NOTES
MD Notification    Notified Person:  MD    Notified Persons Name: Lauren,     Notification Date/Time: 1/28/17 11:53 a.m.    Notification Interaction:  Talked with Physician    Purpose of Notification: Per SW patient has a bed a [facility] today.  Asking for d/c to TCU with PT/OT orders.     Orders Received:    Comments:

## 2017-01-28 NOTE — PLAN OF CARE
Problem: Goal Outcome Summary  Goal: Goal Outcome Summary  Outcome: No Change  Pt up in chair visiting with family much of the day. Pt impulsive, does not use call light. SBA with walker to BR, ambulated [place] x1. Ready to dc to TCU via ride with family at 1600.

## 2017-01-28 NOTE — PROGRESS NOTES
01/28/17 0736   Quick Adds   Type of Visit Initial Occupational Therapy Evaluation   Living Environment   Lives With alone   Living Arrangements apartment;independent living facility   Home Accessibility no concerns   Number of Stairs to Enter Home 0   Number of Stairs Within Home 0   Transportation Available family or friend will provide   Living Environment Comment Pt lives alone in an independent living apartment. Pt's daughter reports she will be unable to stay with pt. Pt's daughter reports pt uses a 4WW mainly to carry things on the seat and out in the community in case she needs to sit.  Pt inaccurate historian, reports independence in all ADLs, unsure of accuracy.   Self-Care   Dominant Hand right   Usual Activity Tolerance good   Current Activity Tolerance moderate   Regular Exercise yes   Activity/Exercise Type walking   Exercise Amount/Frequency daily   Equipment Currently Used at Home walker, rolling  (4WW)   Functional Level Prior   Ambulation 0-->independent  (4WW out in community in case she needs to sit)   Transferring 0-->independent   Toileting 0-->independent   Bathing 0-->independent   Dressing 0-->independent   Eating 0-->independent   Communication 0-->understands/communicates without difficulty   Swallowing 0-->swallows foods/liquids without difficulty   Cognition 1 - attention or memory deficits   Fall history within last six months yes   Number of times patient has fallen within last six months 1   Which of the above functional risks had a recent onset or change? transferring;toileting;bathing;dressing;cognition   Prior Functional Level Comment Per chart, pt utilizes 4WW in community only if needs to sit, pt reports independence in ADLs.   General Information   Onset of Illness/Injury or Date of Surgery - Date 01/23/17   Referring Physician Tomy Aguilar MD   Patient/Family Goals Statement Family's goal is to d/c to TCU   Additional Occupational Profile Info/Pertinent History of  "Current Problem Ms. Enoc Kahn is a 89-year-old  lady with a past medical history notable for mild to moderate memory loss, hypothyroidism and recent history of hallucinations who has presented with acute confusion; acute CVA and New-onset systolic cardiomyopathy likely due to recent NSTEMI   Precautions/Limitations fall precautions   Cognitive Status Examination   Orientation person   Level of Consciousness alert;confused   Able to Follow Commands mild impairment   Personal Safety (Cognitive) moderate impairment;decreased awareness, need for safety;decreased insight to deficits   Memory impaired   Cognitive Comment Pt perseverating on \"Priyanka Cook\" believed she knew OT's family. Required frequent re-direction t/o session.    Visual Perception   Visual Perception Wears glasses   Sensory Examination   Sensory Comments Pt denies numbness/tingling in BUEs   Pain Assessment   Patient Currently in Pain Yes, see Vital Sign flowsheet  (Unable to rate, occasional complaints of pain in L ankle/nomi)   Range of Motion (ROM)   ROM Quick Adds No deficits were identified   Strength   Strength Comments BUE MMT: 4-/5   Hand Strength   Hand Strength Comments WFL gross grasp B   Coordination   Upper Extremity Coordination No deficits were identified   Bed Mobility Skill: Sit to Supine   Level of [place]: Sit/Supine stand-by assist   Physical Assist/Nonphysical Assist: Sit/Supine 1 person assist   Bed Mobility Skill: Supine to Sit   Level of [place]: Supine/Sit stand-by assist   Physical Assist/Nonphysical Assist: Supine/Sit 1 person assist   Transfer Skill: Bed to Chair/Chair to Bed   Level of [place]: Bed to Chair contact guard   Physical Assist/Nonphysical Assist: Bed to Chair 1 person assist   Weight-Bearing Restrictions weight-bearing as tolerated   Assistive Device - Transfer Skill Bed to Chair Chair to Bed Rehab Eval standard walker   Transfer Skill: Sit to Stand   Level of [place]: Sit/Stand " contact guard   Physical Assist/Nonphysical Assist: Sit/Stand 1 person assist   Transfer Skill: Sit to Stand weight-bearing as tolerated   Assistive Device for Transfer: Sit/Stand standard walker   Transfer Skill: Toilet Transfer   Level of [place]: Toilet contact guard   Physical Assist/Nonphysical Assist: Toilet 1 person assist   Weight-Bearing Restrictions: Toilet weight-bearing as tolerated   Assistive Device standard walker   Balance   Balance Comments SBA seated EOB, CGA/min A while in stance FWW level   Upper Body Dressing   Level of [place]: Dress Upper Body minimum assist (75% patients effort)   Physical Assist/Nonphysical Assist: Dress Upper Body 1 person assist   Lower Body Dressing   Level of [place]: Dress Lower Body minimum assist (75% patients effort)   Physical Assist/Nonphysical Assist: Dress Lower Body 1 person assist   Toileting   Level of [place]: Toilet minimum assist (75% patients effort)   Physical Assist/Nonphysical Assist: Toilet 1 person assist   Grooming   Level of [place]: Grooming minimum assist (75% patients effort)   Physical Assist/Nonphysical Assist: Grooming 1 person assist   Instrumental Activities of Daily Living (IADL)   IADL Comments Unsure pt's level of IADL independence due to pt being a poor historian   Activities of Daily Living Analysis   Impairments Contributing to Impaired Activities of Daily Living balance impaired;cognition impaired;pain;strength decreased   ADL Comments Pt presents to OT below baseline level of functioning in all areas of self cares including bathing, dressing, grooming, and toileting   General Therapy Interventions   Planned Therapy Interventions ADL retraining;IADL retraining;strengthening;transfer training   Clinical Impression   Criteria for Skilled Therapeutic Interventions Met yes, treatment indicated   OT Diagnosis Impaired ADLs, IADLs and functional mobility tasks   Influenced by the following impairments Decreased  "strength and functional activity tolerance, impaired balance, impaired cognition and safety   Assessment of Occupational Performance 3-5 Performance Deficits   Identified Performance Deficits Dressing, toileting, bathing, transfers   Clinical Decision Making (Complexity) Low complexity   Therapy Frequency 5 times/wk   Predicted Duration of Therapy Intervention (days/wks) 3 days   Anticipated Discharge Disposition Transitional Care Facility   Risks and Benefits of Treatment have been explained. Yes   Patient, Family & other staff in agreement with plan of care Yes   Clinical Impression Comments Pt would benefit from skilled OT to maximize safety and independence in all ADLs, IADLs and functional mobility tasks due to current deficits impacting function   [facility] AM-PAC  \"6 Clicks\" Daily Activity Inpatient Short Form   1. Putting on and taking off regular lower body clothing? 3 - A Little   2. Bathing (including washing, rinsing, drying)? 3 - A Little   3. Toileting, which includes using toilet, bedpan or urinal? 3 - A Little   4. Putting on and taking off regular upper body clothing? 3 - A Little   5. Taking care of personal grooming such as brushing teeth? 3 - A Little   6. Eating meals? 4 - None   Daily Activity Raw Score (Score out of 24.Lower scores equate to lower levels of function) 19   Total Evaluation Time   Total Evaluation Time (Minutes) 10     "

## 2017-01-28 NOTE — PLAN OF CARE
Problem: Goal Outcome Summary  Goal: Goal Outcome Summary  Outcome: No Change  Pt sat up in chair most of the evening. As the day went on she became increasingly confused but pleasant. Up to the bathroom with SBA. Pt did not use call light appropriately and set off alarm a handful of times. VSS.

## 2017-01-28 NOTE — PROGRESS NOTES
Social Work Progress Note     D:   Pt discussed in interdisciplinary rounds; per rounds, pt is ready for discharge today. Referral was sent to [facility] TCU.     A/I: CARMELO spoke to Tea in admissions at [facility]; she confirms they have a bed available for patient today. Tea asks pt come after 1530 as they have shift change from [id number].    SW updated family and pt; they are agreeable to discharge to TCU today. They would like to transport patient. SW and family set tentative discharge time of 1600.    CC text paged MD to update discharge time and plan. SW updated bedside RN, charge RN and HUC.  SW completed PAS.       P: SW will fax orders and PAS when completed.       GEORGE Drummond [facility]  [phone]      PAS-RR    D: Per DHS regulation, CARMELO completed and submitted PAS-RR to [place] [employer] Direct Connect via the Senior LinkAge Line.      PAS-RR confirmation # is : [id number]    I: SW spoke with patient and they are aware a PAS-RR has been submitted.  CARMELO reviewed with patient that they may be contacted for a follow up appointment within 10 days of hospital discharge if their SNF stay is < 30 days.  Contact information for Senior LinkAge Line was also provided.    A: Patient verbalized understanding.    P: Further questions may be directed to [facility] LinkAge Line at #[phone], option #4 for PAS-RR staff.      GEORGE Drummond [facility]  Ph: [phone]

## 2017-01-28 NOTE — PLAN OF CARE
Problem: Goal Outcome Summary  Goal: Goal Outcome Summary  Outcome: No Change  VSS. Pt alert. Confused at beginning of shift but slept comfortably overnight. Up SBA to BR. Doesn't use call light appropriately, quick to get up. Continue to monitor. Plan to DC to TCU as per SW note.

## 2017-01-28 NOTE — PLAN OF CARE
Problem: Goal Outcome Summary  Goal: Goal Outcome Summary  PT: Orders received.  Per discussion with OT, discharge recommendation is for TCU, pt and family agreeable with this plan.  Will defer further PT evaluation to TCU as pt as able to mobilize with CGA at this time.

## 2017-01-28 NOTE — PROGRESS NOTES
Social Work Progress Note     D:   CARMELO faxed orders and PAS to [facility] at 1450. CARMELO updated Tea at [facility] that pt will arrive after 1600; Liseth confirmed this was fine.     P: Pt will d/c via family at 1600. No other CTS needs.       GEORGE Drummond [facility]  [phone]

## 2017-01-28 NOTE — PLAN OF CARE
Problem: Goal Outcome Summary  Goal: Goal Outcome Summary  OT: Order received, eval completed and treatment initiated. Pt is a 89 year old female admitted for acute confusion, noted acute CVA and likely recent NSTEMI. Info received from chart as pt is questionable historian: pt lives alone in ILF apartment, independent/mod I in ADLs, IADLs, uses 4WW mostly to transport items and if she needs to sit down and rest. Currently, pt able to complete transfers/mobility FWW level with CGA/min A, poor safety awareness with use of FWW; ADL tasks with min A. Pt confused t/o session requiring frequent re-direction/re-orientation to place, situation. Pt perseverating on unrelated topics, difficult to re-direct. OT recommendation: TCU at discharge.

## 2017-01-28 NOTE — DISCHARGE SUMMARY
PRIMARY CARE PHYSICIAN:  Juan C Cage.      DATE OF ADMISSION:  01/23/2017      DATE OF DISCHARGE:  01/28/2017      DISCHARGE DIAGNOSES:   1.  Acute ischemic cerebrovascular accident.   2.  New onset cardiomyopathy, most likely due to recent non-ST elevation myocardial infarction.   3.  Acute delirium -- multifactorial, improved.   4.  Hypothyroidism -- overcorrected.   5.  Mild to moderate memory impairment.   6.  Hypertension.   7.  Urinary tract infection.      CONSULTATIONS:     Palliative care.   Hospice.    Neurology.        IMPORTANT STUDIES AND PROCEDURES DONE:     Echocardiogram.   MRI of the brain with and without.    CT head.      HISTORY OF PRESENT ILLNESS:  Ms. Enoc Taylor is a 89-year-old female who was brought to the hospital with confusion and hallucinations.  Please see admission note dictated by Dr. Dax Matos on 01/23/2017 for the details of presentation.      HOSPITAL COURSE:  The following problems were addressed during the hospital stay:     1.  Acute ischemic CVA.  Initially the patient was admitted for delirium.  The first MRI did not show any infarct; however, on second day of hospital stay, the patient had some new neurological changes including slurring of speech and facial drooping.  So, MRI was done which did show acute infarct in the lateral inferior right frontal lobe and anterior right insula and the right middle cerebral artery territory.  Neurology was consulted.  The family did not want any TPA.  Initially she had problems with dysphagia but remarkably the patient did really well and improved.  Please see description below.  Initially because of her acute stroke, confusion and also possibility of a non-ST elevation MI (see description below) palliative care consultation was obtained.  The family was leaning toward hospice.  Hospice was also consulted and the plan was to discharge her to hospice facility; however, about 48 hours prior to discharge, the patient made a remarkable  improvement in her cognition and ability to swallow.  At this time, even though the patient had already been decided for comfort measures, this was reversed after discussion with the daughter.  By the time of discharge the daughters wanted to hold off on hospice evaluation.  So the patient will be discharged to transitional care.   2.  New onset cardiomyopathy, likely due to recent non-ST elevation myocardial infarction.  The patient presented with a troponin of 0.3.  She did not have any chest pain.  However, an echocardiogram showed EF of 28% with moderate to severe global hypokinesia of the left ventricle with septal akinesis and severe inferior wall hypokinesis.  More than likely she had sustained a non-ST elevation within the last few days of presentation.  Discussion was had with the daughter and the patient about further treatment of this.  They made it very clear that they did not want anything invasive like coronary angiogram.  I offered cardiology consultation, which they did not want.  Like mentioned above, initially there was plan for comfort measures only.  However, with improvement, eventually the family requested that medical treatment be pursued.  She was placed on aspirin, low dose beta blocker and ACE inhibitor.  I also started on statin.  I again discussed with them about getting Cardiology evaluation and further intervention and they were not interested.  They were happy with just medical treatment.   3.  Acute delirium.  This was the initial presentation.  This probably was multifactorial due to possible acute cardiac event, UTI, CVA and overcorrected hypothyroidism.  This had improved and she was much better by the time of discharge.   4.  Hypothyroidism, over corrected.  Patient does have a history of hyperthyroidism treated with thyroid ablation several years ago and subsequent development of acquired hypothyroidism, recent TSH was less than 0.01 at her primary care office on 01/17/2017 and  her dose of her levothyroxine was decreased from 100 to 75 mcg and she had a TSH which was still less than 0.01 and due to acute delirium, I held her levothyroxine for the course of her hospital stay.  I have restarted this at the time of discharge today at a reduced dose of 50 mcg.  Recommend getting a TSH and free T4 in 4 weeks.   5.  Hypertension.  She is not on any prior medication prior to this.  Like mentioned above, she is on lisinopril and Coreg.  This can be up titrated.   6.  Urinary tract infection.  She completed 3 days course of Rocephin.      PHYSICAL EXAMINATION:  On the day of discharge:   GENERAL:  The patient was sitting up in the chair, awake, alert and answering my questions appropriately.   VITAL SIGNS:  Temperature 97.1, blood pressure 155/61, heart rate 80, respiration rate 19, O2 sat 96% on room air.   HEENT:  Atraumatic, normocephalic.  No pallor.   NECK:  Supple.   RESPIRATORY:  Good air entry bilaterally.   CARDIOVASCULAR:  Regular rate and rhythm.   ABDOMEN:  Soft, nontender.   EXTREMITIES:  No edema.   NEUROLOGIC:  Awake, alert, moving all 4 extremities.      At the time of discharge, she did not have any focal neurological deficit.      DISPOSITION:  To transitional care facility.      CODE STATUS:  DNR/DNI.      CONDITION AT DISCHARGE:  Improved.      DISCHARGE MEDICATIONS:   1.  Aspirin 81 mg daily.   2.  Atorvastatin 10 mg daily.   3.  Coreg 3.125 mg twice a day.   4.  Lisinopril 5 mg daily.   5.  Levothyroxine 50 mcg daily.   6.  Vitamin B12 at 1000 mcg daily.      MEDICATIONS STOPPED:  Ibuprofen.      FOLLOWUP INSTRUCTIONS AND PLAN:  With primary care provider in 1 week.      FOLLOWUP RECOMMENDATIONS:  Free T4 and TSH in 4 weeks.      Time spent on discharge more than 30 minutes.         MARIA ANTONIA WEBER MD             D: 2017 12:50   T: 2017 13:50   MT: ciro      Name:     JESUS ORELLANA   MRN:      [id number]        Account:        [id number]   :      1924            Admit Date:     [id number]                                  Discharge Date:       Document: [id number]       cc: Juan C Cage MD

## 2017-01-29 NOTE — PLAN OF CARE
Problem: Goal Outcome Summary  Goal: Goal Outcome Summary  Occupational Therapy Discharge Summary    Reason for therapy discharge:    Discharged to transitional care facility.    Progress towards therapy goal(s). See goals on Care Plan in [facility] electronic health record for goal details.  Goals not met.  Barriers to achieving goals:   discharge on same date as initial evaluation.    Therapy recommendation(s):    Continued therapy is recommended.  Rationale/Recommendations:  to maximize safety and independence in all ADLs, IADLs and functional mobility tasks.

## 2017-01-29 NOTE — TELEPHONE ENCOUNTER
Nursing reports pt admitted with delirium and confusion and pain from arthritis. Request wander guard and pain meds. Orders Ok for wander guard for pt safety and Tylenol 650 mg qid prn pain. Nursing instructed if pt's symptoms change or worsen they are to notify NP or MD. Note signed electronically by Noah Whitfield CNP

## 2017-01-30 NOTE — PROGRESS NOTES
[place] [facility]      PRIMARY CARE PROVIDER AND CLINIC:  Juan C Cage [facility] [address] / [facility] [address]    Chief Complaint   Patient presents with     Hospital F/U       HPI:    Enoc Taylor is a 89 year old  (1/3/1924),admitted to the [facility] from [facility].  Hospital stay 1/23/2017 through 1/28/2017.  Admitted to this facility for  rehab, medical management and nursing care.   She [place] from [place] with confusion, hallucinations and difficulty ambulating.  Initial MRI did not show acute process. Troponin was 0.269. BP was elevated at 180/48. Levothyroxine dose had recently been decreased and TSH was 0. 01 with  free T4 1.52   ECHO: EF 28% with moderate to severe global hypokinesia of the left ventricle with septal akinesis and severe inferior wall hypokinesis. It was thought that she had likely sustained a NSTEMI within a few days prior to admission.   The day after admission, she was noted to have new slurring of speech and left facial droop. MRI 1/24/2017 showed acute infarct in the lateral inferior right frontal lobe, anterior right insula and the right middle cerebral artery territory. Neurology consulted. Family declined aggressive treatment and Palliative Care consulted. Plan was to enroll with hospice, then patient began to improve.  Family opted for medical management, but declined Cardiology evaluation. Statin, ASA, lisinopril and carvedilol started.   She received 3 days of ceftriaxone for UTI.     Current issues are:         Cerebrovascular accident (CVA), unspecified mechanism (H)-her son is here and reports that her cognition is close to baseline, though her conversation is less inhibited. She is somewhat sarcastic. Responds appropriately to most questions. Reports mild difficulty swallowing and feels like something is at the back of her throat. Tolerating regular diet with thin liquids.  No coughing or choking with meals.   Cardiomyopathy (H)-denies cough, shortness of breath or chest pain.   NSTEMI (non-ST elevated myocardial infarction) (H)  Delirium-wanderguard placed due to attempts to leave the unit, wandering into other rooms. Poor short term memory. Essential hypertension with goal blood pressure less than 140/90-BPs: 114/63, 122/83, 136/77, 171/74         HR:  71-90  Acquired hypothyroidism  Primary osteoarthritis-reports pain of various joints. Knees more painful today. Some relief with tylenol. No fall or injury.   ,   CODE STATUS/ADVANCE DIRECTIVES DISCUSSION:   DNR / DNI  Patient's living condition: lives alone at [facility]. Currently does not have services and manages her own meds.     ALLERGIES:Review of patient's allergies indicates no known allergies.  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  PAST SURGICAL HISTORY:  has past surgical history that includes surgical history of -  (1930); APPENDECTOMY (1933); surgical history of -  (1950?); tonsillectomy (1958); rotator cuff repair rt/lt (1998); surgical history of -  (2007); surgical history of -  (1986); cataract iol, rt/lt (2007); and THYROID ABLATION (1998).  FAMILY HISTORY: family history includes Unknown/Adopted in her mother.  SOCIAL HISTORY:  reports that she has never smoked. She has never used smokeless tobacco. She reports that she drinks alcohol. She reports that she does not use illicit drugs.    Post Discharge Medication Reconciliation Status: discharge medications reconciled, continue medications without change.  Current Outpatient Prescriptions   Medication Sig Dispense Refill     acetaminophen (TYLENOL) 325 MG tablet Take 650 mg by mouth every 4 hours as needed       aspirin EC 81 MG EC tablet Take 1 tablet (81 mg) by mouth daily        atorvastatin (LIPITOR) 10 MG tablet Take 1 tablet (10 mg) by mouth daily 30 tablet      lisinopril (PRINIVIL/ZESTRIL) 5 MG tablet Take 1 tablet (5 mg) by mouth daily 30 tablet      carvedilol (COREG) 3.125 MG tablet Take 1 tablet (3.125 mg) by mouth 2 times daily (with meals) 60 tablet      levothyroxine (SYNTHROID/LEVOTHROID) 50 MCG tablet Take 1 tablet (50 mcg) by mouth daily       Cyanocobalamin (B-12) 1000 MCG TBCR Take 1,000 mcg by mouth daily 100 tablet 1       ROS:  10 point ROS of systems including Constitutional, Eyes, Respiratory, Cardiovascular, Gastroenterology, Genitourinary, Integumentary, Muscularskeletal, Psychiatric were all negative except for pertinent positives noted in my HPI.    Exam:  /77 mmHg  Pulse 90  Temp(Src) 99  F (37.2  C)  Resp 16  SpO2 95%  GENERAL APPEARANCE:  Alert, in no distress  ENT:  Mouth and posterior oropharynx normal, moist mucous membranes, [place]  EYES:  EOM normal, conjunctiva and lids normal, PERRL  NECK:  No adenopathy,masses or thyromegaly  RESP:  respiratory effort and palpation of chest normal, lungs clear to auscultation , no respiratory distress  CV:  Palpation and auscultation of heart done , regular rate and rhythm, 2/6 murmur,  no edema, +2 pedal pulses  ABDOMEN:  normal bowel sounds, soft, nontender, no hepatosplenomegaly or other masses  M/S:   gait steady with walker and stand by assist. Deformity both knees, no acute inflammation. SANTOS with good strength  SKIN:  no rashes or open areas  PSYCH:  oriented to self, son, place, situation, month and year, insight and judgement impaired, memory impaired , affect and mood normal    Lab/Diagnostic data:     WBC   Date Value Ref Range Status   01/23/2017 7.9 4.0 - 11.0 10e9/L Final     HEMOGLOBIN   Date Value Ref Range Status   01/23/2017 12.0 11.7 - 15.7 g/dL Final   05/02/2016 13.7 11.7 - 15.7 g/dL Final     Last Basic Metabolic Panel:  NA      140   1/23/2017   POTASSIUM      4.1   1/23/2017  CHLORIDE       107   1/23/2017  SAMIR      8.6   1/23/2017  CO2       26   1/23/2017  BUN       20   1/23/2017  CR     0.73   1/23/2017  GLC      119   1/23/2017    WBC      7.9   1/23/2017  RBC     4.01   1/23/2017  HGB     12.0   1/23/2017  HCT     34.9   1/23/2017  MCV       87   1/23/2017  MCH     29.9   1/23/2017  MCHC     34.4   1/23/2017  RDW     12.9   1/23/2017  PLT      310   1/23/2017    TSH   Date Value Ref Range Status   01/23/2017 <0.01* 0.40 - 4.00 mU/L Final       ASSESSMENT / PLAN:  (I63.9) Cerebrovascular accident (CVA), unspecified mechanism (H)  (primary encounter diagnosis)  Comment: no focal neurological deficits. Deconditioned.   Plan: PHYSICAL THERAPY/OT/ST. Neurology follow up 2/10/2017. Continue current meds. Patient and son confirm DNR/DNI.     (I42.9) Cardiomyopathy, unspecified (H)  (I21.4) NSTEMI (non-ST elevated myocardial infarction) (H)  Comment: appears asymptomatic  Plan: continue ASA, statin, lisinopril and carvedilol. Monitor VS. Family declines Cardiology follow up.     (R41.0) Delirium  Comment: resolved. She is close to baseline cognition, per son report  Plan: supportive care. [facility] due to impulsivity and fall risk. Cognitive testing per therapies.     (I10) Essential hypertension with goal blood pressure less than 140/90  Comment: controlled. Not on antihypertensives prior to hospitalization.  Plan: continue lisinopril and carvedilol. Monitor VS. Avoid hypotension due to advanced age and fall risk. BMP    (E03.9) Acquired hypothyroidism  Comment: levothyroxine dose decreased inpatient  Plan: continue levothyroxine 50 mcg daily. Repeat TSH 6-8 weeks.     (M15.0) Primary osteoarthritis  Comment: chronic pain of various joints. No signs of acute process  Plan: tylenol prn. Therapies.         Information reviewed:  Medications, vital signs, orders, nursing notes, problem list, hospital information.    Electronically signed by:  DANIEL Aly  CNP

## 2017-01-30 NOTE — PROGRESS NOTES
[place] [facility]      PRIMARY CARE PROVIDER AND CLINIC:  Juan C Cage [facility] [address] / [facility] [address]    Chief Complaint   Patient presents with     Hospital F/U       HPI:    Enoc Taylor is a 89 year old  (1/3/1924),admitted to the [facility] from [facility].  Hospital stay 1/23/2017 through 1/28/2017.      HOSPITAL COURSE:  The following problems were addressed during the hospital stay:      1.  Acute ischemic CVA.  Initially the patient was admitted for delirium.  The first MRI did not show any infarct; however, on second day of hospital stay, the patient had some new neurological changes including slurring of speech and facial drooping.  So, MRI was done which did show acute infarct in the lateral inferior right frontal lobe and anterior right insula and the right middle cerebral artery territory.  Neurology was consulted.  The family did not want any TPA.  Initially she had problems with dysphagia but remarkably the patient did really well and improved.  Please see description below.  Initially because of her acute stroke, confusion and also possibility of a non-ST elevation MI (see description below) palliative care consultation was obtained.  The family was leaning toward hospice.  Hospice was also consulted and the plan was to discharge her to hospice facility; however, about 48 hours prior to discharge, the patient made a remarkable improvement in her cognition and ability to swallow.  At this time, even though the patient had already been decided for comfort measures, this was reversed after discussion with the daughter.  By the time of discharge the daughters wanted to hold off on hospice evaluation.  So the patient will be discharged to transitional care.    2.  New onset cardiomyopathy, likely due to recent non-ST elevation myocardial infarction.  The patient presented with a troponin of 0.3.  She did  not have any chest pain.  However, an echocardiogram showed EF of 28% with moderate to severe global hypokinesia of the left ventricle with septal akinesis and severe inferior wall hypokinesis. More than likely she had sustained a non-ST elevation within the last few days of presentation.  Discussion was had with the daughter and the patient about further treatment of this.  They made it very clear that they did not want anything invasive like coronary angiogram.  I offered cardiology consultation, which they did not want.  Like mentioned above, initially there was plan for comfort measures only.  However, with improvement, eventually the family requested that medical treatment be pursued.  She was placed on aspirin, low dose beta blocker and ACE inhibitor.  I also started on statin. I again discussed with them about getting Cardiology evaluation and further intervention and they were not interested.  They were happy with just medical treatment.    3.  Acute delirium.  This was the initial presentation.  This probably was multifactorial due to possible acute cardiac event, UTI, CVA and overcorrected hypothyroidism.  This had improved and she was much better by the time of discharge.    4.  Hypothyroidism, over corrected.  Patient does have a history of hyperthyroidism treated with thyroid ablation several years ago and subsequent development of acquired hypothyroidism, recent TSH was less than 0.01 at her primary care office on 01/17/2017 and her dose of her levothyroxine was decreased from 100 to 75 mcg and she had a TSH which was still less than 0.01 and due to acute delirium, I held her levothyroxine for the course of her hospital stay.  I have restarted this at the time of discharge today at a reduced dose of 50 mcg.  Recommend getting a TSH and free T4 in 4 weeks.    5.  Hypertension.  She is not on any prior medication prior to this.  Like mentioned above, she is on lisinopril and Coreg.  This can be up  titrated.    6.  Urinary tract infection.  She completed 3 days course of Rocephin.      Admitted to this facility for  rehab, medical management and nursing care. Current issues are:      Cardiomyopathy, unspecified (H)  No SOB, no CP    Delirium  Cerebrovascular accident (CVA), unspecified mechanism (H)  Cognitive impairment  Per discussion with family, pt has had memory problems for a while with impulse control.    Acquired hypothyroidism  On decreased dose of synthroid.        CODE STATUS/ADVANCE DIRECTIVES DISCUSSION:   DNR / DNI  Patient's living condition: lives alone    ALLERGIES:Review of patient's allergies indicates no known allergies.  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  PAST SURGICAL HISTORY:  has past surgical history that includes surgical history of -  (1930); APPENDECTOMY (1933); surgical history of -  (1950?); tonsillectomy (1958); rotator cuff repair rt/lt (1998); surgical history of -  (2007); surgical history of -  (1986); cataract iol, rt/lt (2007); and THYROID ABLATION (1998).  FAMILY HISTORY: family history includes Unknown/Adopted in her mother.  SOCIAL HISTORY:  reports that she has never smoked. She has never used smokeless tobacco. She reports that she drinks alcohol. She reports that she does not use illicit drugs.    Post Discharge Medication Reconciliation Status: medication reconcilation previously completed during another office visit.  Current Outpatient Prescriptions   Medication Sig Dispense Refill     acetaminophen (TYLENOL) 325 MG tablet Take 650 mg by mouth every 4 hours as needed       aspirin EC 81 MG EC tablet Take 1 tablet (81 mg) by mouth daily       atorvastatin (LIPITOR) 10 MG tablet Take 1 tablet (10 mg) by mouth daily 30 tablet      lisinopril (PRINIVIL/ZESTRIL) 5 MG tablet Take 1 tablet (5 mg)  by mouth daily 30 tablet      carvedilol (COREG) 3.125 MG tablet Take 1 tablet (3.125 mg) by mouth 2 times daily (with meals) 60 tablet      levothyroxine (SYNTHROID/LEVOTHROID) 50 MCG tablet Take 1 tablet (50 mcg) by mouth daily       Cyanocobalamin (B-12) 1000 MCG TBCR Take 1,000 mcg by mouth daily 100 tablet 1       ROS:  Unobtainable secondary to cognitive impairment or aphasia, but today pt reports denies pain.    Exam:  /71 mmHg  Pulse 67  Temp(Src) 97.9  F (36.6  C)  Resp 20  SpO2 97%     BP Readin/72, 136/77, 114/63  GENERAL APPEARANCE:  Alert, in no distress, sitting in WC  ENT:  Mouth and posterior oropharynx normal, moist mucous membranes, hearing acuity sl [place]  EYES:  Conjunctivae, lids, pupils and irises normal  NECK:  No adenopathy,masses or thyromegaly  RESP:  respiratory effort and palpation of chest normal, no respiratory distress, Lung sounds clear  CV:  Palpation and auscultation of heart done, rate and rhythm reg, gr 2/6 systolic murmur, no rub or gallop, Edema none, DP pulses 2+.  ABDOMEN:  normal bowel sounds, soft, nontender, no hepatosplenomegaly or other masses  M/S:   Bilateral knees with enlarged joints, ROM normal, Digits and nails normal  SKIN:  Inspection/Palpation of skin and subcutaneous tissue no rashes or wounds  NEURO: face symmetrical,  equal  PSYCH:  insight and judgement seem impaired in conversation, memory seems slightly impaired , affect and mood normal      Lab/Diagnostic data:     WBC   Date Value Ref Range Status   2017 7.9 4.0 - 11.0 10e9/L Final     HEMOGLOBIN   Date Value Ref Range Status   2017 12.0 11.7 - 15.7 g/dL Final   2016 13.7 11.7 - 15.7 g/dL Final     Last Basic Metabolic Panel:  NA      140   2017   POTASSIUM      4.1   2017  CHLORIDE      107   2017  SAMIR      8.6   2017  CO2       26   2017  BUN       20   2017  CR     0.73   2017  GLC      119   2017    WBC      7.9    1/23/2017  RBC     4.01   1/23/2017  HGB     12.0   1/23/2017  HCT     34.9   1/23/2017  MCV       87   1/23/2017  MCH     29.9   1/23/2017  MCHC     34.4   1/23/2017  RDW     12.9   1/23/2017  PLT      310   1/23/2017    TSH   Date Value Ref Range Status   01/23/2017 <0.01* 0.40 - 4.00 mU/L Final       ASSESSMENT/PLAN:  (I42.9) Cardiomyopathy, unspecified (H)  (primary encounter diagnosis)  Comment: Probably 2/2 NSTEMI, medical treatment inprogress.  No sx.  Plan: Continue current medicaitons.    (R41.0) Delirium  Comment: Probably 2/2 NSTEMI +/- R CVA.  May be close to plateau, though full recovery from CVA may be delayed x months.  Plan: Eval cognitive fn to assist with discharge planning.    (I63.9) Cerebrovascular accident (CVA), unspecified mechanism (H)  Comment: Possibly atherosclerotic givn en NSTEMI  Plan: no anticoagulation 2/2 general fragility.    (R41.89) Cognitive impairment  Comment: Probably chronic.    May need increased services or more supportive living environment.  Plan: Cognitive eval as above.    (E03.9) Acquired hypothyroidism  Comment: May have been taking erratically when at home, with taking more than prescribed.  Plan: Recheck TSH in 4 weeks and 12 weeks after meds taken consistently as prescribed.      Information reviewed:  Medications, vital signs, orders, nursing notes, problem list, hospital information.   Electronically signed by:  Giovanna Liao MD, MS

## 2017-02-03 PROBLEM — R53.81 PHYSICAL DECONDITIONING: Status: ACTIVE | Noted: 2017-01-01

## 2017-02-03 PROBLEM — M15.0 PRIMARY OSTEOARTHRITIS INVOLVING MULTIPLE JOINTS: Status: ACTIVE | Noted: 2017-01-01

## 2017-02-03 PROBLEM — I63.9 CEREBROVASCULAR ACCIDENT (H): Status: ACTIVE | Noted: 2017-01-01

## 2017-02-03 PROBLEM — I21.4 NSTEMI (NON-ST ELEVATED MYOCARDIAL INFARCTION) (H): Status: ACTIVE | Noted: 2017-01-01

## 2017-02-03 PROBLEM — I42.9 CARDIOMYOPATHY, UNSPECIFIED (H): Status: ACTIVE | Noted: 2017-01-01

## 2017-02-03 NOTE — PROGRESS NOTES
[facility]    Chief Complaint   Patient presents with     RECHECK       HPI:    Enoc Taylor is a 89 year old  (1/3/1924), who is being seen today for an episodic care visit at [facility].   She came to this facility 1/28/2017 following hospitalization for confusion, hallucinations and difficulty ambulating. She was found to have EF 28% with moderate to severe global hypokinesia of the left ventricle with septal akinesis and severe inferior wall hypokinesis. It was thought that she had likely sustained a NSTEMI within a few days prior to admission. The day following hospital admission, she had new slurring of her speech and left facial droop. MRI showed acute infarcts. Family declined aggressive treatment and wished for medical management only. Statin, ASA, lisinopril and carvedilol started.     Today's concern is:     Cerebrovascular accident (CVA), unspecified mechanism (H)-reports feeling well. Good appetite.   Cardiomyopathy, unspecified (H)  NSTEMI (non-ST elevated myocardial infarction) (H)-denies cough, shortness of breath or chest pain.   Acquired hypothyroidism-levothyroxine decreased inpatient for TSH 0.01 with free T4  1.52.   Essential hypertension with goal blood pressure less than 140/90-BPs: 132/60, 144/69, 143/71       HR:  60-68  Cognitive impairment-poor short term memory. Responds appropriately to questions.   Slow transit constipation-nurse reports constipation. Patient denies abdominal pain.   Physical deconditioning-ambulating with walker and stand by assist. Requires assist of 1 with cares.     ALLERGIES: Review of patient's allergies indicates no known allergies.  Past Medical, Surgical, Family and Social History reviewed and updated in [facility].    Current Outpatient Prescriptions   Medication Sig Dispense Refill     acetaminophen (TYLENOL) 325 MG tablet Take 650 mg by mouth every 4 hours as needed       aspirin EC 81 MG EC tablet Take 1 tablet (81 mg) by  mouth daily       atorvastatin (LIPITOR) 10 MG tablet Take 1 tablet (10 mg) by mouth daily 30 tablet      lisinopril (PRINIVIL/ZESTRIL) 5 MG tablet Take 1 tablet (5 mg) by mouth daily 30 tablet      carvedilol (COREG) 3.125 MG tablet Take 1 tablet (3.125 mg) by mouth 2 times daily (with meals) 60 tablet      levothyroxine (SYNTHROID/LEVOTHROID) 50 MCG tablet Take 1 tablet (50 mcg) by mouth daily       Cyanocobalamin (B-12) 1000 MCG TBCR Take 1,000 mcg by mouth daily 100 tablet 1     Medications reviewed:  Medications reconciled to facility chart and changes were made to reflect current medications as identified as above med list. Below are the changes that were made:   Medications stopped since last EPIC medication reconciliation:   There are no discontinued medications.    Medications started since last [facility] medication reconciliation:  No orders of the defined types were placed in this encounter.     REVIEW OF SYSTEMS:  10 point ROS of systems including Constitutional, Eyes, Respiratory, Cardiovascular, Gastroenterology, Genitourinary, Integumentary, Muscularskeletal, Psychiatric were all negative except for pertinent positives noted in my HPI.    Physical Exam:  /67 mmHg  Pulse 56  Temp(Src) 98.1  F (36.7  C)  Resp 19  Wt 127 lb 9.6 oz (57.879 kg)  SpO2 56%  GENERAL APPEARANCE:  Alert, in no distress  ENT:  Mouth and posterior oropharynx normal, moist mucous membranes, [place]  EYES:  EOM normal, conjunctiva and lids normal  NECK:  No adenopathy,masses or thyromegaly  RESP:  respiratory effort and palpation of chest normal, lungs clear to auscultation , no respiratory distress  CV:  Palpation and auscultation of heart done , regular rate and rhythm, 2/6 murmur,  no edema, +2 pedal pulses  ABDOMEN:  soft, nontender, no hepatosplenomegaly or other masses  M/S:   gait steady with walker. Deformity both knees, no acute inflammation. SANTOS with good strength  SKIN:  no rashes or open areas  PSYCH:  oriented to  self, place, situation, insight and judgement impaired, memory impaired , affect and mood normal    Recent Labs:      Last Basic Metabolic Panel:  NA      137   2/3/2017   POTASSIUM      4.3   2/3/2017  CHLORIDE      104   2/3/2017  SAMIR      9.3   2/3/2017  CO2       23   2/3/2017  BUN       18   2/3/2017  CR     0.73   2/3/2017  GLC      105   2/3/2017      WBC      7.9   1/23/2017  RBC     4.01   1/23/2017  HGB     12.0   1/23/2017  HCT     34.9   1/23/2017  MCV       87   1/23/2017  MCH     29.9   1/23/2017  MCHC     34.4   1/23/2017  RDW     12.9   1/23/2017  PLT      310   1/23/2017    ASSESSMENT / PLAN:  (I63.9) Cerebrovascular accident (CVA), unspecified mechanism (H)  (primary encounter diagnosis)  Comment: no focal deficits. She is probably close to her baseline status.   Plan: continue therapies, current meds. Neurology follow up 2/10/2017.     (I42.9) Cardiomyopathy, unspecified (H)  (I21.4) NSTEMI (non-ST elevated myocardial infarction) (H)  Comment: asymptomatic  Plan: continue ASA, statin, lisinopril and carvedilol. Monitor VS. Family declines Cardiology follow     (E03.9) Acquired hypothyroidism  Comment: levothyroxine dose decreased inpatient  Plan: continue levothyroxine 50 mcg daily. Repeat TSH 6-8 weeks.     (I10) Essential hypertension with goal blood pressure less than 140/90  Comment: controlled.   Plan: continue lisinopril and carvedilol. Monitor VS. Follow BMP     (R41.89) Cognitive impairment  Comment: appears to have moderate deficits at baseline. Delirium resolved.   Plan: cognitive testing in progress. Supportive care on [facility].     (K59.01) Slow transit constipation  Comment: chronic  Plan: senna at HS.     (R53.81) Physical deconditioning  Comment: progressing in therapies  Plan: continue PHYSICAL THERAPY/OT. Goal is to return to her [place] with increased services.         Electronically signed by  DANIEL Aly CNP

## 2017-02-08 PROBLEM — R41.89 COGNITIVE IMPAIRMENT: Status: ACTIVE | Noted: 2017-01-01

## 2017-02-08 PROBLEM — K59.01 SLOW TRANSIT CONSTIPATION: Status: ACTIVE | Noted: 2017-01-01

## 2017-02-14 NOTE — PROGRESS NOTES
[facility] DISCHARGE SUMMARY    PATIENT'S NAME: Enoc Taylor  YOB: 1924  MEDICAL RECORD NUMBER:  [id number]    PRIMARY CARE PROVIDER AND CLINIC RESPONSIBLE AFTER TRANSFER: onsite [facility] team at [facility]    CODE STATUS/ADVANCE DIRECTIVES DISCUSSION:   DNR / DNI     No Known Allergies    TRANSFERRING PROVIDERS: DANIEL Perea CNP, Giovanna Liao MD  DATE OF SNF ADMISSION:  January / 28 / 2017  DATE OF SNF (anticipated) DISCHARGE: February / 16 / 2017  DISCHARGE DISPOSITION: [facility]   Nursing Facility: [facility] stay 1/23/2017 to 1/28/2017.     Condition on Discharge:  Improving.  Cognitive Scores: SBT 13/28, CPT 3.9/5.6, MMSE 23/30    Equipment: walker    DISCHARGE DIAGNOSIS:   1. Cerebrovascular accident (CVA), unspecified mechanism (H)    2. NSTEMI (non-ST elevated myocardial infarction) (H)    3. Cardiomyopathy, unspecified (H)    4. Essential hypertension with goal blood pressure less than 140/90    5. Acquired hypothyroidism    6. Slow transit constipation    7. Cognitive impairment    8. Physical deconditioning        [facility] Course:  She came to this facility following hospitalization for confusion, hallucinations and difficulty ambulating. She was found to have EF 28% with moderate to severe global hypokinesia of the left ventricle with septal akinesis and severe inferior wall hypokinesis. It was thought that she had likely sustained a NSTEMI within a few days prior to admission. The day following hospital admission, she had new slurring of her speech and left facial droop. MRI showed acute infarcts. Family declined aggressive treatment and wished for medical management only. Statin, ASA, lisinopril and carvedilol started.     She has worked with PHYSICAL THERAPY, OT and SPEECH THERAPY with good progress. Up ad sherly with walker. Independent with dressing and  toileting. Requires stand by assist with bathing.     ASSESSMENT / PLAN:  (I63.9) Cerebrovascular accident (CVA), unspecified mechanism (H)  (primary encounter diagnosis)  Comment: no focal deficits  Plan: [place] back to her [place]. Home services as below. Family plans to change to the onsite [facility] team for primary care.     (I21.4) NSTEMI (non-ST elevated myocardial infarction) (H)  (I42.9) Cardiomyopathy, unspecified (H)  Comment: appears asymptomatic.   Plan: continue ASA, statin, lisinopril and carvedilol. Monitor VS. Family declines Cardiology follow     (I10) Essential hypertension with goal blood pressure less than 140/90  Comment: controlled with BPs:   147/68, 113/55, 126/58       HR:  62-74  Plan: continue lisinopril and carvedilol.     (E03.9) Acquired hypothyroidism  Comment: levothyroxine dose decreased inpatient  Plan: continue levothyroxine 50 mcg daily. Repeat TSH 6-8 weeks.     (K59.01) Slow transit constipation  Comment: chronic  Plan: continue bowel regimen    (R41.89) Cognitive impairment  Comment: moderate deficits. Probable underlying dementia.   Plan: supportive care. Home care RN for med management.     (R53.81) Physical deconditioning  Comment: progress in therapies as above. She is back to baseline status.   Plan: home therapies not indicated.     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    DISCHARGE MEDICATIONS:  Current Outpatient Prescriptions   Medication Sig Dispense Refill     oseltamivir (TAMIFLU) 30 MG capsule Take 30 mg by mouth daily for 10 days.       sennosides (SENOKOT) 8.6 MG tablet Take 2 tablets by mouth daily       acetaminophen (TYLENOL) 325 MG tablet Take 650 mg by mouth every 4 hours as needed       aspirin EC 81 MG EC tablet Take 1 tablet (81 mg) by mouth daily       atorvastatin (LIPITOR) 10 MG tablet  Take 1 tablet (10 mg) by mouth daily 30 tablet      lisinopril (PRINIVIL/ZESTRIL) 5 MG tablet Take 1 tablet (5 mg) by mouth daily 30 tablet      carvedilol (COREG) 3.125 MG tablet Take 1 tablet (3.125 mg) by mouth 2 times daily (with meals) 60 tablet      levothyroxine (SYNTHROID/LEVOTHROID) 50 MCG tablet Take 1 tablet (50 mcg) by mouth daily       Cyanocobalamin (B-12) 1000 MCG TBCR Take 1,000 mcg by mouth daily 100 tablet 1       MEDICATION CHANGES/RATIONALE:   Senna for chronic constipation.  Tamiflu started 2/8/2017 for prophylaxis due to widespread influenza at the facility.   Controlled medications sent with patient: not applicable/none     ROS:    4 point ROS including Respiratory, CV, GI and , other than that noted in the HPI,  is negative    Physical Exam:   Vitals: /67  Pulse 62  Temp 98.4  F (36.9  C)  Resp 20  Wt 125 lb (56.7 kg)  SpO2 94%  BMI 22.86 kg/m2  BMI= Body mass index is 22.86 kg/(m^2).     GENERAL APPEARANCE:  Alert, in no distress  RESP:  respiratory effort and palpation of chest normal, lungs clear to auscultation , no respiratory distress  CV:  Palpation and auscultation of heart done , regular rate and rhythm, 2/6 murmur,  no edema, +2 pedal pulses  ABDOMEN:  soft, nontender, no hepatosplenomegaly or other masses  M/S:   gait steady with walker. Deformity both knees, no acute inflammation. SANTOS with good strength  SKIN:  no rashes or open areas  PSYCH:  oriented to self, place, situation, insight and judgement impaired, memory impaired , affect and mood normal    DISCHARGE PLAN:  Registered Nurse, Home Health Aide and From:  Home Health Care [facility]  Patient instructed to follow-up with:  PCP in 7 days     Pending labs: None  SNF labs     Last Basic Metabolic Panel:  Lab Results   Component Value Date     02/03/2017      Lab Results   Component Value Date    POTASSIUM 4.3 02/03/2017     Lab Results   Component Value Date    CHLORIDE 104 02/03/2017     Lab Results   Component Value  Date    SAMIR 9.3 02/03/2017     Lab Results   Component Value Date    CO2 23 02/03/2017     Lab Results   Component Value Date    BUN 18 02/03/2017     Lab Results   Component Value Date    CR 0.73 02/03/2017     Lab Results   Component Value Date     02/03/2017       Lab Results   Component Value Date    WBC 7.9 01/23/2017     Lab Results   Component Value Date    RBC 4.01 01/23/2017     Lab Results   Component Value Date    HGB 12.0 01/23/2017     Lab Results   Component Value Date    HCT 34.9 01/23/2017     Lab Results   Component Value Date    MCV 87 01/23/2017     Lab Results   Component Value Date    MCH 29.9 01/23/2017     Lab Results   Component Value Date    MCHC 34.4 01/23/2017     Lab Results   Component Value Date    RDW 12.9 01/23/2017     Lab Results   Component Value Date     01/23/2017       TSH   Date Value Ref Range Status   01/23/2017 <0.01 (L) 0.40 - 4.00 mU/L Final       Discharge Treatments: None    TOTAL DISCHARGE TIME:   Greater than 30 minutes  Electronically signed by:  DANIEL Aly CNP        Documentation of Face-to-Face and Certification for Home Health Services     Patient: Enoc Taylor   YOB: 1924  MR Number: [id number]  Today's Date: 2/14/2017    I certify that patient: Enoc Taylor is under my care and that I, or a nurse practitioner or physician's assistant working with me, had a face-to-face encounter that meets the physician face-to-face encounter requirements with this patient on: 2/14/2017.    This encounter with the patient was in whole, or in part, for the following medical condition, which is the primary reason for home health care: CVA, NSTEMI.    I certify that, based on my findings, the following services are medically necessary home health services: Nursing.    My clinical findings support the need for the above services because: Nurse is needed: To assess VS, fluid and cardiac status after changes in medications or other medical  regimen. and To provide assessment and oversight required in the home to assure adherence to the medical plan due to: cognitive impairment, at risk for rehospitalization..    Further, I certify that my clinical findings support that this patient is homebound (i.e. absences from home require considerable and taxing effort and are for medical reasons or [employer] services or infrequently or of short duration when for other reasons) because: Requires assistance of another person or specialized equipment to access medical services because patient: Is prone to wander/get lost without assistance. and Is unable to exit home safely on own due to: cognitive deficits, limited endurance...    Based on the above findings. I certify that this patient is confined to the home and needs intermittent skilled nursing care, physical therapy and/or speech therapy.  The patient is under my care, and I have initiated the establishment of the plan of care.  This patient will be followed by a physician who will periodically review the plan of care.  Physician/Provider to provide follow up care: Ritesh PCP at [facility] [place]    Responsible Medicare certified PECOS Physician: Electronically signed by Dr. Lupe Liao MD, and only signing for initial order. Please send all follow up questions and concerns or needed follow up signatures to the PCP Ritesh onsite team at [facility]    Physician Signature: See electronic signature associated with these discharge orders.  Date: 2/14/2017  Electronically signed by Giovanna Liao MD, MS on February 16, 2017 12:48 PM

## 2017-02-17 NOTE — TELEPHONE ENCOUNTER
Reason for Call: Request for an order or referral:    Order or referral being requested: PT to eval and treat strength building/endurance and gait training OT eval and treat for memory cognition and stimulation    Date needed: as soon as possible    Has the patient been seen by the PCP for this problem? YES    Additional comments: ok to leave verbal order    Phone number Patient can be reached at:  Other phone number:  Home care nurse    Best Time:      Can we leave a detailed message on this number?  YES    Call taken on 2/17/2017 at 11:20 AM by GRISELDA WILSON

## 2017-02-23 NOTE — TELEPHONE ENCOUNTER
Reason for Call:  Form, our goal is to have forms completed with 72 hours, however, some forms may require a visit or additional information.    Type of letter, form or note:      Who is the form from?: [facility]- PT (if other please explain)    Where did the form come from: form was faxed in    What clinic location was the form placed at?: [facility]    Where the form was placed: Dr's Box: Montana Cage MD    What number is listed as a contact on the form?: [phone]       Additional comments:     Call taken on 2/23/2017 at 2:13 PM by Lori Arriaga

## 2017-02-23 NOTE — TELEPHONE ENCOUNTER
Reason for Call:  Form, our goal is to have forms completed with 72 hours, however, some forms may require a visit or additional information.    Type of letter, form or note:      Who is the form from?: [facility]- Face to Face (if other please explain)    Where did the form come from: form was faxed in    What clinic location was the form placed at?: [facility]    Where the form was placed: Dr's Box: Montana Cage MD    What number is listed as a contact on the form?: [phone]       Additional comments:     Call taken on 2/23/2017 at 9:15 AM by Lori Arriaga

## 2017-03-03 NOTE — TELEPHONE ENCOUNTER
Reason for Call:  Form, our goal is to have forms completed with 72 hours, however, some forms may require a visit or additional information.    Type of letter, form or note:      Who is the form from?: [facility] (if other please explain)    Where did the form come from: form was faxed in    What clinic location was the form placed at?: [facility]    Where the form was placed: Dr's Box: Montana Cage MD    What number is listed as a contact on the form?: [phone]       Additional comments:     Call taken on 3/3/2017 at 12:03 PM by Lori Arriaga

## 2017-04-03 NOTE — TELEPHONE ENCOUNTER
Reason for Call:  Form, our goal is to have forms completed with 72 hours, however, some forms may require a visit or additional information.    Type of letter, form or note:      Who is the form from?: [facility]- OT/PT eval (if other please explain)    Where did the form come from: form was faxed in    What clinic location was the form placed at?: [facility]    Where the form was placed: Dr's Box: Montana Cage MD    What number is listed as a contact on the form?: [phone]       Additional comments:     Call taken on 4/3/2017 at 12:28 PM by Lori Arriaga

## 2017-04-11 PROBLEM — Z71.89 ACP (ADVANCE CARE PLANNING): Chronic | Status: ACTIVE | Noted: 2017-01-01

## 2017-05-02 NOTE — TELEPHONE ENCOUNTER
Reason for Call:  Form, our goal is to have forms completed with 72 hours, however, some forms may require a visit or additional information.    Type of letter, form or note:      Who is the form from?: [facility] (if other please explain)    Where did the form come from: form was faxed in    What clinic location was the form placed at?: [facility]    Where the form was placed: Dr's Box: Montana Cage MD    What number is listed as a contact on the form?: [phone]       Additional comments:     Call taken on 5/2/2017 at 2:14 PM by Lori Arriaga

## 2017-06-19 NOTE — TELEPHONE ENCOUNTER
Our goal is to have forms completed within 72 hours, however some forms may require a visit or additional information.    What clinic location was the form placed at [facility] or [place]     Who is the form from? [facility]  Where did the form come from? Faxed to clinic   The form was placed in the inbox of Juan C Cage MD      Please fax to [phone]    Additional comments:     Call take on 6/19/2017 at 9:35 AM by Anita Mota

## 2017-07-13 PROBLEM — I63.9 STROKE (CEREBRUM) (H): Status: ACTIVE | Noted: 2017-01-01

## 2017-07-13 NOTE — ED NOTES
Pt lives in memory care facility. Witnessed going unresponsive at facility at 1715. EMS called. Pt arrived in ED at 1744. Transferred to CT at 1751.

## 2017-07-13 NOTE — ED NOTES
Bed: ST01  Expected date: 7/13/17  Expected time:   Means of arrival:   Comments:  [facility] 434-stroke

## 2017-07-13 NOTE — ED PROVIDER NOTES
History     Chief Complaint:  Cerebrovascular Accident      HPI   History is limited secondary to the patient's mental status.    Enoc Taylor is a 89 year old female who presents with evaluation for stroke. Approximately around 17:15, the patient was eating dinner at her memory care community home when staff noticed that she was suddenly unresponsive and drooling on her right side with some right sided facial droop. Upon arrival, EMS states that she would not follow any commands, arm was dropping to the side, distant gazing towards the left, and was unable to speak. Blood sugar was 130 on scene, they report her blood pressure was normal. Earlier in January 2017, the patient had an acute right side stroke however her family denied TPA, was going to make her hospice, but the patient was discharged after a full recovery. Her current code status per EMS is full, but our records show she is DNR/DNI. To note: The patient is currently taking cipro for a UTI.     Allergies:  No Known Drug Allergies    Medications:    oseltamivir   Sennosides   Aspirin   Atorvastatin   Lisinopril   Carvedilol   Levothyroxine   Cyanocobalamin    Past Medical History:    Cerebral infarction  Chronic rhinitis   HPV   Other chest pain   Hyperthyroidism    Thyrotoxic     Past Surgical History:    Appendectomy   Thyroid Ablation   Cataract IOL   Rotator Cuff repair   Cyst removal from lower lip   Cyst removal from right shoulder  Excess bone removed from left foot   Repair of food arch  Tonsillectomy     Family History:    No past pertinent family history.    Social History:  The patient was accompanied to the ED by EMS.  Smoking Status: No  Smokeless Tobacco: No  Alcohol Use: Yes  Marital Status:   [5]    Review of Systems   Unable to perform ROS: Mental status change     Physical Exam   Vitals:  Patient Vitals for the past 24 hrs:   BP Pulse Heart Rate Resp SpO2 Weight   07/13/17 2106 136/62 - 64 - 95 % -   07/13/17 2000 149/74 - 82 - -  -   07/13/17 1945 132/84 - 70 11 95 % -   07/13/17 1925 158/81 - 80 - 95 % -   07/13/17 1854 172/87 - 74 15 97 % -   07/13/17 1845 - - 67 13 98 % -   07/13/17 1840 176/78 - 64 16 - -   07/13/17 1830 - - 68 11 97 % -   07/13/17 1748 170/80 67 - 14 97 % 58.9 kg (129 lb 12.8 oz)       Physical Exam   National Institutes of Health Stroke Scale (Baseline)  Time Performed: 1744      Score    Level of consciousness: (1)   Not alert; arousable w/ minor stim to obey/answer/respond    LOC questions: (2)   Answers neither question correctly    LOC commands: (2)   Performs neither task correctly    Best gaze: (1)   Partial gaze palsy    Visual: (0)   No visual loss    Facial palsy: (1)   Minor paralysis (flat nasolabial fold, smile asymmetry)    Motor arm (left): (4)   No movement    Motor arm (right): (4)   No movement    Motor leg (left): (4)   No movement    Motor leg (right): (4)   No movement    Limb ataxia: (0)   Absent    Sensory: (0)   Normal- no sensory loss    Best language: (3)   Mute- global aphasia    Dysarthria: (2)   Severe dysaphria    Extinction and inattention: (0)   No abnormality        Total Score:  28      Emergency Department Course     ECG:  ECG taken at 1822, ECG read at 1825  Sinus rhythm with 1st degree AV block.   Left axis deviation/   Nonspecific intraventricular block  Cannot rule out Anteroseptal infarct, age undetermined  T wave abnormality, consider lateral ischemia   Abnormal ECG  Rate 65 bpm. WY interval 244. QRS duration 128. QT/QTc 450/468. P-R-T axes 71 -45 90.    Imaging:  Radiology findings were communicated with the patient who voiced understanding of the findings.  CTA Angiogram:   There appears to be occlusion of the P1 segment of the left posterior cerebral artery.  Reading per radiology.    CT Head w/ Contrast:  Normal CT perfusion of the brain.  Reading per radiology.    CT Head w/o Contrast:  1. No acute pathology, no bleed, mass, or acute infarcts are seen.  2. Age related  changes including diffuse brain atrophy. White matter changes consistent with small vessel ischemic disease.   3. Chronic areas of encephalomalacia in the right frontal and right parietal regions. These are in the right middle cerebral artery distribution.  Reading per radiology.    Laboratory:  Laboratory findings were communicated with the patient who voiced understanding of the findings.  BMP: Chloride: 110 (H), o/w WNL. (Creatinine 0.79)  CBC: RBC: 3.51 (L),  HGB 11.2 (L), o/w WNL. (WBC 6.9, )   INR: 1.00  Partial Thromboplastin Time: 27    Interventions:  1830 Normal Saline 1000 mL IV    Emergency Department Course:  Nursing notes and vitals reviewed.  I performed an exam of the patient as documented above.   The patient was sent for a CTA Angiogram, CT Head w/ Contrast, and CT Head w/o Contrast while in the emergency department, results above.   IV was inserted and blood was drawn for laboratory testing, results above.  EKG obtained in the ED, see results above.     1744 Patient arrived to the ED.  1746 Code stroke called   1748 Going to CT   1750 Spoke with Neurologist, Dr. Marshall   1754 Attempt to contact Marquis Taylor, primary contact, daughter, no answer   1755 Attempt to contact arias Paulino. Made aware of patient condition and verified code status is DNR/DNI   1815 CAT Scan reports that she is able to move and speak a little   1820 Spoke with Neurologist, Dr. Marshall   1830 The patient returned from CT. Neurological exam unchanged.   1834 Spoke with Neuro-radiology, reports that nothing is acute on initial pass, but contrast infiltrated, so she needs to go back for angiogram  1840 Spoke with daughter who arrived. Patient is very clear that she is DNR/DNI, in fact she's almost wanting to make her comfort if not hospice care. I advised her regarding the results of the CT thus far CT Scan without contrast is not to any acute bleeding, mass. She have  significant encephalomalacia  from her previous stroke. She's contacting a family member at this time.   1920 PM: I spoke with Dr. Carrasquillo who saw an occlusion of the left posterior cerebral artery at the T1 level.   1938 After consulting with other family members, they do not want any neuro interventions.   1955 Spoke with Dr. Chisholm for patient admission.    I discussed the treatment plan with the patient. They expressed understanding of this plan and consented to admission. I discussed the patient with admission, who will admit the patient to a monitored bed for further evaluation and treatment.    I personally reviewed the laboratory results with the Patient and answered all related questions prior to admission.    Impression & Plan      Medical Decision Making:  Enoc Taylor is a 89 year old female who is presented by EMS for acute stroke symptoms. This was a witnessed acute deficit occurring at 05:15pm noticed by her staff at her care facility. Ambulance was called immediately, they noted right sided hemiparesis, relatively unresponsiveness but this was due to her complete aphasia. On her physical exam here, she had right sided hemiparesis, appeared to have left sided gaze deviation, mild facial droop on the right side, was drooling out of the right side of her mouth as well, was not moving any extremities, but did move a little bit of her left upper and lower extremities to pain but not much.     Code stroke was called immediately upon arrival. I spoke with Dr. Vargas of neurology. EMS stated she's a full code however on quick review of her chart she was seen six months ago for acute stroke on the right side, TPA was deferred at the time due to family preference. She was actually going to go into hospice but did make a significant recovery so they sent her to TCU before setting her back to [facility] where her assisted living was.     CT Scan of her brain was initially read as no acute stroke. The IV did infiltrate the contrast so she  had to return to the emergency department to get another IV placed and sent back for Angiogram. What was readable from the initial Angiogram, although this was partial, was no acute findings  After she returned to the CT Scan for the remainder of their perfusion study, Dr. Bustamante of Neuro-radiology did call me and state there was a clot at the T1 segment of the left posterior cerebral artery.     Initially upon the daughters arrival soon after the patient arrived, the patient's daughter confirmed that she is DNR/DNI, in fact she would like to make her comfort care. However she would like to know what is going on so we proceeded with the CT/CTA imaging. After finding the clot on her imaging, I spoke with her about possible interventions including thrombectomy, intraarterial TPA, that could help with her symptoms. The patient's daughter did consult with family members and everybody is in agreement that no intervention is wanted at this time and they would like to make her comfort care.     At this time the patient's neurologic exam really has not improved, she did start uttering a few words but she really does not say anything to me on repeat neurologic exams. At this time the patient was unfortunately an acute stroke with significant neurologic impairment. Based on the family's wishes I will admit her to comfort care to the hospital to Dr. Chisholm.     Critical Care time was 60 minutes for this patient excluding procedures.     CMS Diagnoses: The patient has stroke symptoms:           ED Stroke specific documentation           NIHSS PDF          Protocol PDF     Patient last known well time: 1700  ED Provider first to bedside at: 1744  CT Results received at: 1925  Patient was not treated with TPA due to the following reason(s):  Patient (or their surrogate decision maker) refused TPA treatment after discussion of risks and benefits.    National Institutes of Health Stroke Scale (Baseline)  Time Performed: 1744       Score    Level of consciousness: (1)   Not alert; arousable w/ minor stim to obey/answer/respond    LOC questions: (2)   Answers neither question correctly    LOC commands: (2)   Performs neither task correctly    Best gaze: (1)   Partial gaze palsy    Visual: (0)   No visual loss    Facial palsy: (1)   Minor paralysis (flat nasolabial fold, smile asymmetry)    Motor arm (left): (4)   No movement    Motor arm (right): (4)   No movement    Motor leg (left): (4)   No movement    Motor leg (right): (4)   No movement    Limb ataxia: (0)   Absent    Sensory: (0)   Normal- no sensory loss    Best language: (3)   Mute- global aphasia    Dysarthria: (2)   Severe dysaphria    Extinction and inattention: (0)   No abnormality        Total Score:  28      Stroke Mimics were considered (including migraine headache, seizure disorder, hypoglycemia (or hyperglycemia), head or spinal trauma, CNS infection, Toxin ingestion and shock state (e.g. sepsis) .    Contrast CT was delayed due to IV infiltration.     Diagnosis:    ICD-10-CM    1. Acute embolic stroke (H) I63.9      Disposition:  Admission    Scribe Disclosure:  Ernestina THOMAS, am serving as a scribe at 5:56 PM on 7/13/2017 to document services personally performed by Char Thomas MD, based on my observations and the provider's statements to me.    7/13/2017    EMERGENCY DEPARTMENT       Char Thomas MD  07/13/17 4183

## 2017-07-14 NOTE — H&P
PRIMARY CARE PHYSICIAN:  None stated    CHIEF COMPLAINT:  Acute stroke.    HISTORY OF PRESENT ILLNESS:  Enoc Taylor is a 89-year-old female who was hospitalized in 01/2017 when she initially presented with delirium and during that hospitalization was noted to have an acute stroke.  She also had a new onset cardiomyopathy which was thought likely due to recent chest non ST-elevation MI.  The patient had initially been in the OBS unit with the delirium.  On the second hospital day, the patient had new neurological changes including slurring speech and facial drooping.  An MRI was done which showed acute infarct in the lateral inferior right frontal lobe and the anterior right inflow in the middle cerebral artery territory on the right.  Neurology was consulted.  The family did not want any tPA.  The patient, however, made a good recovery and actually was able to leave the [facility] and be in assisted living.  The patient was able to eat and was able to read and the family was quite pleased at her recovery.    The patient was in her assisted living in the community dining room when staff noted around 1755 that she slumped to the right and was unresponsive.  She had some drooling on her right side.  She did not move the right side of her body.  EMS was called and they noted that she did not follow commands.  Her arm was dropping to the side and she had gazing toward the left and was unable to speak.  The patient is DNR/DNI.  The patient was brought to the emergency room where initially her code status was not known.  Dr. Vargas was consulted by the ER physician.  The patient had a CT angiogram of her head which showed occlusion of the P1 segment of the left posterior cerebral artery.  However, when the patient's daughter was able to arrive, the patient's daughter noted that the patient was quite clear that she was DNR/DNI and would not want any kind of thrombectomy or any kind of treatment and so the patient was  made comfort care.  The patient, however, did have improvement of her symptoms.  She was able to follow some commands and was moving her right leg, able to slightly grasp on her right hand.  The patient's daughter, although she still wants her comfort care, would like to have the patient have intravenous fluids and aspirin suppository as given her history of doing fairly good recovery from a prior stroke in January of this year.  Has some concern that the patient might be able to recover; however, the patient's daughter does not want to do anything heroic and would like the patient reassessed in the morning to see how the patient has done overnight.  The patient's daughter would like the patient to be continued on Cipro thinking that this might make her more comfortable.  The patient has 1 other child who is a son who lives in [place] and the daughter did discuss this with the patient's son who is in agreement with the plan.    PAST MEDICAL HISTORY:    1.  History of cerebral infarction 2017 as in History of Present Illness.  2.  History of hyperthyroidism.  3.  History of cardiomyopathy noted in 2017 thought to be due to a recent non-ST elevation MI.  4.  History of hypertension.  5.  Recent urinary tract infection for which the patient is on Cipro.  6.  History of HPV infection.  7.  History of chronic rhinitis.  8.  History of acquired hypothyroidism following ablation for hyperthyroidism.  9.  History of acute toxic encephalopathy in 2016 due to over treatment with levothyroxine as well as alcohol intoxication.    PAST SURGICAL HISTORY:    1.  History of appendectomy.  2.  Bilateral rotator cuff repair.  3.  Repair of left foot arch.  4.  Right cataract surgery.  5.  Removal of cyst from the lower lip.  6.  Removal of cyst from right shoulder.  7.  History of removal of excess bone from the left foot.    FAMILY HISTORY:  Negative for premature coronary artery disease or stroke.  Mother  at age  100.  Health history of biological father is unknown.    SOCIAL HISTORY:  The patient is .  She currently resides in assisted living.  She was not a smoker.    ALLERGIES:  No known drug allergies.    MEDICATIONS ON ADMISSION:    1.  Cipro.  2.  Acetaminophen.  3.  Aspirin 81 mg daily.    4.  Lipitor 10 mg daily.    5.  Carvedilol 3.125 mg twice a day.    6.  Cyanocobalamin 1000 mcg daily.    7.  Levothyroxine 75 mcg every morning.    8.  Lisinopril 5 mg daily.    9.  Senokot.    REVIEW OF SYSTEMS:  Unable to perform due to the patient's nonverbal status.    PHYSICAL EXAM:    GENERAL:  Elderly female who looks younger than her stated age.    HEENT:  Pupils appeared equal.  The buccal mucosa appears moist.   CHEST:  Clear to auscultation.   CARDIOVASCULAR:   Regular rate and rhythm.  Normal S1, S2.    ABDOMEN:   Positive bowel sounds.  Soft, nontender.    NEUROLOGIC:  Cranial nerves 2 through 12 appear grossly intact.  The patient, however, does have some coughing episodes.  She does not have any facial droop.  She is able to follow commands such as opening her mouth.  The patient does not move her right arm except she does have some faint grasp of her right fingers.  The patient is able to move her right leg and her left side moves well.      EKG shows sinus rhythm, first degree AV block.  CT angiogram showed occlusion of P1 segment left posterior cerebral artery.  CT of her head was normal and showed no acute pathology.  No bleed, mass or acute infarct seen.  Age-related changes including diffuse brain atrophy.  White matter changes consistent with small vessel ischemic disease.  Chronic areas of encephalomalacia in right frontal and right parietal region.  These are in the right middle cerebral artery territory.    LABS:   Sodium 140, potassium 4.2, chloride 110, bicarb 23, BUN 22, creatinine 0.79 glucose 95.  White count 6.9, hemoglobin 11.2, platelet count 213.  INR 1.00.  Urinalysis showed a trace of  blood, small leukocyte esterase, 0-2 WBCs, 5-10 RBCs and 2 casts.     ASSESSMENT AND PLAN:  Enoc Taylor is a 89-year-old female with a history of a stroke in 01/2017 for which the patient had a fairly good recovery.  The patient also had some cardiomyopathy diagnosed at that time.  The patient now presented with new symptoms of a stroke with a CT angiogram that showed that included P1 segment of the left posterior cerebral artery.  The patient initially was unresponsive and not following commands and could not move the right side.  She is, however, now moving her right leg and was able to follow commands such as opening her mouth on demand.  The patient has been quite clear, however, that she did not want anything heroic done and the daughter is aware of her advance directives.  Given her stroke, the patient's daughter wants the patient comfort care, however, would like to have intravenous fluids overnight to see if she does have some recovery.  The patient's daughter also agrees to do aspirin suppository and would like to have her Cipro continued given as the daughter thinks that this may help her comfort level.  Otherwise the patient will be comfort care.      1.  Comfort care with IV fluids and rectal aspirin as above.    2.  Recent urinary tract infection.  The patient's daughter would like to have her Cipro continued and will do this IV.  3.  Nutrition.  The patient will be on IV fluids, but will have her be NPO as she appears to be a risk for aspiration.    4.  History of cardiomyopathy.  Will hold her cardiac meds including carvedilol and ACE inhibitor, given her NPO status.  5.  History of hypothyroidism.  Will need to hold her levothyroxine.     6.  Deep venous thrombosis prophylaxis.  The patient will be on SCDs.      CODE STATUS:  DNR/DNI.    DISPOSITION:  The patient will be admitted under inpatient status for now as I anticipate that she may be here more than 2 midnights.  The patient will be on the 8th  floor, however.          Chrissie Chisholm MD    D:  07/14/2017 01:18 T:  07/14/2017 01:50  Document:  [id number] EL\

## 2017-07-14 NOTE — PROGRESS NOTES
H&P note    89 year old who had sx of stroke at 5:15 pm.  CT showed occlusion of left posterior cerebral artery.  Dr. Vargas consulted by ER.  Daughter wants pt comfort care due to the pt's very clear advance directives, but given the pt had a good recovery from a stroke in Jan 2017, daughter would like the pt to have iv fluids overnight.  Daughter also at least for tonight would like asa.  Daughter would like reassessment in the am as pt is following some directions tonight.    Stroke  -recent history of stroke with recovery  -comfort care, but with iv fluids in case the pt recovers function  -continue asa suppository for now  -reassess in am  -DNR/DNI  -npo due to high risk for aspiration    Recent UTI  -was on cipro on admit  -daughter would like this continued for now for comfort    YEE Chisholm MD

## 2017-07-14 NOTE — PLAN OF CARE
Problem: Goal Outcome Summary  Goal: Goal Outcome Summary  Outcome: No Change  Pt A/Ox1, oriented to self. Assist x1 with walker and gait belt, fall risk. VSS on RA. Pt agreed to start IV fluids and antibiotics; however then she pulled IV out and both were stopped. Comfort cares. NPO. Pt refused neuro assessment. D/C pending progress.

## 2017-07-14 NOTE — ED NOTES
[facility]  ED Nurse Handoff Report    ED Chief complaint: Cerebrovascular Accident      ED Diagnosis:   Final diagnoses:   Acute embolic stroke (H)       Code Status: DNR / DNI    Allergies: No Known Allergies    Activity level - Baseline/Home:  Total Care    Activity Level - Current:   Total Care     Needed?: No    Isolation: No  Infection: Not Applicable    Bariatric?: No    Vital Signs:   Vitals:    07/13/17 1854 07/13/17 1925 07/13/17 1945 07/13/17 2000   BP: 172/87 158/81 132/84 149/74   Pulse:       Resp: 15  11    SpO2: 97% 95% 95%    Weight:           Cardiac Rhythm: ,        Pain level:      Is this patient confused?: Patient is confused, unable to follow command at this time and unable to speak.   Patient Report: Initial Complaint: Stroke symptoms.  Focused Assessment: Patient was eating dinner at the [facility] where she resides and staff noticed that patient was unresponsive and drooling on the right side of her face. Patient is aphasic, unable to follow commands, has right sided weakness, slight facial droop on the right side and left sided gaze. Patient has a Hx of Stroke earlier this year. Patient is currently on Cipro for a UTI. Patient has extravasation on left upper arm. Cold compresses applied.  Tests Performed: Labs; EKG; CT Head w/ &w/o contrast.  Abnormal Results: Per CT : Subtle perfusion defect in the left occipital region.  Treatments provided: 1000 mL NS.    Family Comments: Daughter (Kit) at bedside. Very supportive.  OBS brochure/video discussed/provided to patient: N/A    ED Medications:   Medications   iopamidol (ISOVUE-370) solution 120 mL (120 mLs Intravenous Given 7/13/17 1829)   sodium chloride 0.9 % for CT scan flush dose 100 mL (100 mLs Intravenous Given 7/13/17 1830)       Drips infusing?:  No      ED NURSE PHONE NUMBER: [phone]

## 2017-07-14 NOTE — PLAN OF CARE
Problem: Goal Outcome Summary  Goal: Goal Outcome Summary  Outcome: No Change  ED transfer @ 2130. A&Ox3, disoriented to situation. Able to make needs known, restless at times. UW1-2 to the BSC. Comfort cares, with IVF, IV abx and ASA for tonight per family's request, but pt declined all the scheduled meds and pt's daughter agreed. Pt also declined BP check, O2 96% RA, Resp 16 and HR in 70s. NPO. RN will cont to monitor.

## 2017-07-14 NOTE — PLAN OF CARE
Problem: Goal Outcome Summary  Goal: Goal Outcome Summary  Outcome: Improving  Pt remains alert to self, coop. Up with assist of 1 and walker,fall risk, uo to BSC x2, chair x2. VSS. Denies pain. Gil diet, fair appetite, swallowed meds whole without difficulty. Left gaze x1 which corrected after a nap, neuro's DC'd. Palliative consult placed. Dtr helpful with pt.

## 2017-07-14 NOTE — PHARMACY-ADMISSION MEDICATION HISTORY
Admission medication history interview status for the 7/13/2017 admission is complete. See Epic admission navigator for allergy information, pharmacy, prior to admission medications and immunization status.     Medication history interview sources:  MAR from [facility]        Prior to Admission medications    Medication Sig Last Dose Taking? Auth Provider   senna-docusate (SENOKOT-S;PERICOLACE) 8.6-50 MG per tablet Take 1 tablet by mouth daily as needed for constipation  Yes Unknown, Entered By History   ACETAMINOPHEN PO Take 1,000 mg by mouth 3 times daily 7/13/2017 at x2 Yes Unknown, Entered By History   CIPROFLOXACIN PO Take 250 mg by mouth 2 times daily 5 day course started on 7/11/17 7/13/2017 at am Yes Unknown, Entered By History   ASPIRIN PO Take 81 mg by mouth daily 7/13/2017 at 0800 Yes Unknown, Entered By History   LEVOTHYROXINE SODIUM PO Take 75 mcg by mouth every morning 7/13/2017 at 0800 Yes Unknown, Entered By History   atorvastatin (LIPITOR) 10 MG tablet Take 1 tablet (10 mg) by mouth daily 7/13/2017 at 0800 Yes Tomy Aguilar MD   lisinopril (PRINIVIL/ZESTRIL) 5 MG tablet Take 1 tablet (5 mg) by mouth daily 7/13/2017 at 0800 Yes Tomy Aguilar MD   carvedilol (COREG) 3.125 MG tablet Take 1 tablet (3.125 mg) by mouth 2 times daily (with meals) 7/13/2017 at 0800 Yes Tomy Aguilar MD   Cyanocobalamin (B-12) 1000 MCG TBCR Take 1,000 mcg by mouth daily 7/13/2017 at 0800 Yes Juan C Cage MD         Medication history completed by: Salima Nelson, PbD

## 2017-07-14 NOTE — CONSULTS
[facility]    Palliative Care Consultation     Enoc Taylor  MRN# [id number]  Date of Admission:  7/13/2017  Date of Service (when I saw the patient): 07/14/17  Reason for consult: Consulted by Dr. Cook for Goals of care    Assessment & Plan   Enoc Taylor is a 89 year old female with PMH significant for CVA 1/2017, hypothyroidism, cardiomyopathy, NSTEMI, HTN, and chronic rhinitis, who presents with right sided weakness and unresponsiveness. Imaging demonstrates left posterior cerebral artery CVA. Initially family elected comfort measures, but then decided to do reasonable attempts to restore health. We are consulted for goals of care.      Symptoms/Recommendations   -Reasonable to trial PT/OT to regain what function can be regained, in order to be more successful at [facility]  -Dtr prefers she discharge to her custodial with therapy as opposed to a different rehab center   -Pending pt's progress in rehab, dtr Kit is well versed in options moving forward. They can visit in our palliative clinic (provided pamphlet on how to set up an appointment) vs have a hospice consultation to learn more about a comfort care approach     Support/Coping  -Dtr Kit present and supportive today  -Son lives out of state, he is a neuropsychologist and involved in pt's care via phone     Decisional Support, Goals of Care, Counseling & Coordination  Decisional Capacity Intact?  -No  Health Care Directive on File?  -Yes, reviewed   Code Status/Resuscitation Preferences?  -DNR/DNI     Discussion  Visited with pt and daughter Kit this afternoon. Introduced the scope of our practice to pt and family. Discussed our potential roles for symptom management, support/coping, and decisional support (aka goals of care).     Pt was generally somnolent, but joined the conversation at times with notable tangential speech and difficulty with word finding. Her conversation was difficult to track, generally referencing care from her facility, not this  present admission. Marquis tried to empower and redirect her as much as possible.     Marquis was able to clearly explain to me her mother's health issues and wishes moving forward. We both referenced Enoc's health care directive, which is very clearly focused on quality of life and comfort. Marquis recognizes there is a role to try to regain some function with therapy, as long as Enoc is willing to participate. This is in reference to pt's optimistic recovery from her CVA in January. Therefore, Marquis believes it reasonable to try to regain function, if possible. Marquis notes that being independent, being able to read, and being able to converse is very important to the patient. Ideally Marquis wants Enoc to return to [facility] for therapy, as opposed to going to a TCU. If things do not go well with rehabilitation, it appears that Marquis is very well versed in deciding where to go from there. If her aphasia and reading capacity do not improve, that is not a good sign in the context of pt's quality of life, per Marquis.  I offer for her to come see a palliative provider in the clinic to talk about options at that point. Or, she can call hospice for a consultation and decide on the right timing of hospice. Determination of whether to rehospitalize the next time pt becomes ill will depend on the pt's condition at that time. Marquis appreciates the discussion and affirmation that she is on the right track.     Case reviewed with Dr. Rosario, unit CARMELO Alvarado, and Isela CERRATO.     Thank you for involving us in the care of this patient and family. We will sign off at this time. Please do not hesitate to contact me with questions or concerns or the on-call provider for our team if evening or weekend.    Lisa SANCHEZ, [facility]  Palliative Medicine   Pager [phone]    Attestation:  Total time on the floor involved in the patient's care: 60 minutes  Total time spent in counseling/care coordination: >50%    Chief Complaint   Right sided weakness,  unresponsiveness     History is obtained from the patient, staff, family and extensive chart review.     Past Medical History    I have reviewed this patient's medical history and updated it with pertinent information if needed.   Past Medical History:   Diagnosis Date     Cerebral infarction (H)     01/17 07/17     Chronic rhinitis     prn benadryl or claritin     HPV (human papillomavirus)     saw Dr. 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  Past Surgical History:   Procedure Laterality Date     C APPENDECTOMY  1933     C THYROID ABLATION  1998     CATARACT IOL, RT/LT  2007    Rt eye     ROTATOR CUFF REPAIR RT/LT  1998     SURGICAL HISTORY OF -   1930    cyst removal from lower lip     SURGICAL HISTORY OF -   1950?    cyst removal, rt shoulder     SURGICAL HISTORY OF -   2007    excess bone removed from L foot     SURGICAL HISTORY OF -   1986    repair of foot arch, Lt     TONSILLECTOMY  1958       Social History   Living situation: [facility]    Family system: Dtr Kit present, son lives out of state but connected by phone     Self-identified support system: Family     Employment/education: [place]    Activities/interests: ND    Use of community resources: ND    [employer] affiliation: ND    Involvement in atilio community: ND    Impact of illness on patient: It is very important to pt that she be able to be fairly independent with her cares and be able to read     Family History   I have reviewed this patient's family history and updated it with pertinent information if needed.   Family  History   Problem Relation Age of Onset     Unknown/Adopted Mother      mother  at 100        Allergies   No Known Allergies    Medications   Current Facility-Administered Medications Ordered in Epic   Medication Dose Route Frequency Last Rate Last Dose     ciprofloxacin (CIPRO) tablet 250 mg  250 mg Oral Q12H JACOB   250 mg at 17 0853     aspirin tablet 325 mg  325 mg Oral Daily   325 mg at 17 0853     levothyroxine (SYNTHROID/LEVOTHROID) tablet 75 mcg  75 mcg Oral QAM   75 mcg at 17 1024     senna-docusate (SENOKOT-S;PERICOLACE) 8.6-50 MG per tablet 1 tablet  1 tablet Oral Daily PRN         labetalol (NORMODYNE/TRANDATE) injection 10 mg  10 mg Intravenous Q4H PRN         naloxone (NARCAN) injection 0.1-0.4 mg  0.1-0.4 mg Intravenous Q2 Min PRN         0.9% sodium chloride infusion   Intravenous Continuous   Stopped at 17 0230     acetaminophen (TYLENOL) Suppository 650 mg  650 mg Rectal Q4H PRN         bisacodyl (DULCOLAX) Suppository 10 mg  10 mg Rectal Daily PRN         morphine (PF) injection 1-2 mg  1-2 mg Intravenous Q1H PRN         ondansetron (ZOFRAN-ODT) ODT tab 4 mg  4 mg Oral Q6H PRN        Or     ondansetron (ZOFRAN) injection 4 mg  4 mg Intravenous Q6H PRN         LORazepam (ATIVAN) injection 0.5-1 mg  0.5-1 mg Intravenous Q3H PRN        Or     LORazepam (ATIVAN) tablet 0.5-1 mg  0.5-1 mg Oral Q3H PRN        Or     LORazepam (ATIVAN) tablet 0.5-1 mg  0.5-1 mg Sublingual Q3H PRN         No current [facility]-ordered outpatient prescriptions on file.       Review of Systems   The comprehensive review of systems is negative other than noted here and in the assessment/plan.    Palliative Symptom Review (0=no symptom/no concern, 1=mild, 2=moderate, 3=severe):  Pain: 0  Constipation: 0  Diarrhea: 0  Shortness of Breath: 0    Physical Exam   Temp: 98  F (36.7  C) Temp src: Oral BP: 145/70 Pulse: 67 Heart Rate: 55 Resp: 16 SpO2: 96 % O2 Device: None (Room air)    Vitals:    17 1749    Weight: 58.9 kg (129 lb 12.8 oz)     CONSTITUTIONAL: Chronically ill elderly woman seen sitting up in bed in NAD, sleepy yet does arouse during conversation, noted to have mild aphasia and tangential speech. She is generally calm. Dtr present.   HEENT: NCAT  RESPIRATORY: NL respiratory effort on RA  NEUROLOGIC: Tangential speech, difficulty with word finding, difficulty tracking conversation   PSYCH: Affect flat, frustrated     Data   Results for orders placed or performed during the hospital encounter of 07/13/17 (from the past 24 hour(s))   Basic metabolic panel   Result Value Ref Range    Sodium 140 133 - 144 mmol/L    Potassium 4.2 3.4 - 5.3 mmol/L    Chloride 110 (H) 94 - 109 mmol/L    Carbon Dioxide 23 20 - 32 mmol/L    Anion Gap 7 3 - 14 mmol/L    Glucose 95 70 - 99 mg/dL    Urea Nitrogen 22 7 - 30 mg/dL    Creatinine 0.79 0.52 - 1.04 mg/dL    GFR Estimate 68 >60 mL/min/1.7m2    GFR Estimate If Black 82 >60 mL/min/1.7m2    Calcium 8.9 8.5 - 10.1 mg/dL   CBC with platelets differential   Result Value Ref Range    WBC 6.9 4.0 - 11.0 10e9/L    RBC Count 3.51 (L) 3.8 - 5.2 10e12/L    Hemoglobin 11.2 (L) 11.7 - 15.7 g/dL    Hematocrit 32.1 (L) 35.0 - 47.0 %    MCV 92 78 - 100 fl    MCH 31.9 26.5 - 33.0 pg    MCHC 34.9 31.5 - 36.5 g/dL    RDW 13.9 10.0 - 15.0 %    Platelet Count 213 150 - 450 10e9/L    Diff Method Automated Method     % Neutrophils 48.8 %    % Lymphocytes 35.5 %    % Monocytes 8.8 %    % Eosinophils 6.1 %    % Basophils 0.7 %    % Immature Granulocytes 0.1 %    Nucleated RBCs 0 0 /100    Absolute Neutrophil 3.3 1.6 - 8.3 10e9/L    Absolute Lymphocytes 2.4 0.8 - 5.3 10e9/L    Absolute Monocytes 0.6 0.0 - 1.3 10e9/L    Absolute Eosinophils 0.4 0.0 - 0.7 10e9/L    Absolute Basophils 0.1 0.0 - 0.2 10e9/L    Abs Immature Granulocytes 0.0 0 - 0.4 10e9/L    Absolute Nucleated RBC 0.0    INR   Result Value Ref Range    INR 1.00 0.86 - 1.14   Partial thromboplastin time   Result Value Ref Range    PTT 27  22 - 37 sec   CT Head w/o Contrast    Narrative    CT HEAD W/O CONTRAST   7/13/2017 6:01 PM     HISTORY: CODE STROKE, right-sided weakness.    TECHNIQUE: Axial images of the head without IV contrast material.  Radiation dose for this scan was reduced using automated exposure  control, adjustment of the mA and/or kV according to patient size, or  iterative reconstruction technique.    COMPARISON: None.    FINDINGS:  There is generalized atrophy of the brain.  Areas of low  attenuation are present in the white matter of the cerebral  hemispheres that are consistent with small vessel ischemic disease in  this age patient. There are chronic areas of encephalomalacia in the  right frontal and right parietal regions. There is no evidence of  intracranial hemorrhage, mass, acute infarct or anomaly. The  visualized portions of the sinuses and mastoids appear normal. There  is no evidence of trauma.      Impression    IMPRESSION:   1. No acute pathology, no bleed, mass, or acute infarcts are seen.  2. Age related changes including diffuse brain atrophy.  White matter  changes consistent with small vessel ischemic disease.   3. Chronic areas of encephalomalacia in the right frontal and right  parietal regions. These are in the right middle cerebral artery  distribution.    CAROLE CHO MD   EKG 12 lead   Result Value Ref Range    Interpretation ECG Click View Image link to view waveform and result    CT Head w Contrast    Narrative    CT BRAIN PERFUSION 7/13/2017 6:30 PM    HISTORY: Code Stroke    TECHNIQUE: Time sequential axial CT images of the head were acquired  during the administration of intravenous contrast 50 mL Isovue-370 .  CTA images of the [place] as well as color perfusion maps of  the brain were created from this time sequential axial source data.   Radiation dose for this scan was reduced using automated exposure  control, adjustment of the mA and/or kV according to patient size, or  iterative  reconstruction technique.    COMPARISON: None.    FINDINGS: On the time to peak images, there is a subtle perfusion  defect in the left occipital region. The CBV images appear normal in  this region. The flow images also appear normal in this region.      Impression    IMPRESSION: Subtle perfusion defect in the left occipital region. This  would be consistent with ischemic brain injury. No definite core  infarct is identified.    CAROLE CHO MD   *UA reflex to Microscopic (ED Lab POCT Only 3-11)   Result Value Ref Range    Color Urine Yellow     Appearance Urine Clear     Glucose Urine Negative NEG mg/dL    Bilirubin Urine Negative NEG    Ketones Urine Negative NEG mg/dL    Specific Gravity Urine 1.015 1.003 - 1.035    Blood Urine Trace (A) NEG    pH Urine 5.5 5.0 - 7.0 pH    Protein Albumin Urine Negative NEG mg/dL    Urobilinogen Urine 0.2 0.2 - 1.0 EU/dL    Nitrite Urine Negative NEG    Leukocyte Esterase Urine Small (A) NEG    Source Catheterized Urine    Urine Microscopic   Result Value Ref Range    WBC Urine O - 2 0 - 2 /HPF    RBC Urine 5-10 (A) 0 - 2 /HPF    Cast Urine 0-2  HYALINE   0 - 2 /LPF    Squamous Epithelial /LPF Urine Few FEW /LPF    Mucous Urine Present (A) NEG /LPF   CTA Angiogram Head Neck    Narrative    CT ANGIOGRAM OF THE HEAD AND NECK  7/13/2017 7:21 PM     HISTORY: Right-sided weakness, code stroke    TECHNIQUE:  Precontrast localizing scans were followed by CT  angiography with an injection of 70 mL IV contrast with scans through  the head and neck.  Images were transferred to a separate 3-D  workstation where multiplanar reformations and 3-D images were  created.  Estimates of carotid stenoses are made relative to the  distal internal carotid artery diameters except as noted. Radiation  dose for this scan was reduced using automated exposure control,  adjustment of the mA and/or kV according to patient size, or iterative  reconstruction technique.    COMPARISON: None.    FINDINGS:  Estimates of stenosis at the carotid bifurcations are  relative to the distal internal carotids.    Arch: Calcified atherosclerotic plaque is seen in the arch of the  aorta.    Neck:  Right Carotid:  The right common carotid artery is normal.  There is a  small amount of calcified plaque at the right carotid bifurcation. No  stenosis..  The right internal carotid artery is negative.     Left Carotid:  The left common carotid artery is unremarkable.   The  left carotid bifurcation appears normal.  The left internal carotid is  negative.      Vertebrals:  The vertebral arteries are normal.    Head:  Right Carotid:No occluded vessels are seen. .    Left Carotid:  No occluded vessels are identified. .    Basilar: Basilar artery appears normal. There appears to be occlusion  of the P1 segment of the left posterior cerebral artery. This is seen  on axial source images [id number]. Is also seen on coronal reformatted  image 28 of series 6 and 44 of series 8..     Miscellaneous: Venous sinuses appear patent.      Impression    IMPRESSION: There appears to be occlusion of the P1 segment of the  left posterior cerebral artery.    Findings discussed with Dr. Martha CHO MD

## 2017-07-14 NOTE — PROGRESS NOTES
[facility]    Hospitalist Progress Note    Date of Service (when I saw the patient): 07/14/2017    Assessment & Plan   Enoc Taylor is a 89 year old female who was admitted on 7/13/2017    1. CVA: left posterior cerebral artery distribution.   --patient was placed on comfort care on 7/13. However, after further discussion with patient's daughter and patient, they would prefer a palliative approach but not comfort care. Specifically they are open to PT and OT, IVF and routine post CVA care (permissive hypertension, attempt to return to previous ADL status), but do not wish to aggressively pursue etiology, or aggressive intervention, nor do they wish to perform speech eval as the patient would prefer to advance diet for comfort reasons and they understand that there is risk of aspiration with this approach.    ---we discussed involving the palliative care service to better discern goals of care and help with ongoing palliative needs. The daughter also clarified that they want to continue treating her chronic and acute medical conditions      2. Hypothyroidism: resume synthroid    3. ? UTI?  Resume cipro.    4. HTN: holding lisinopril and coreg---favor permissive hypertension in immediate post CVA period.  Will make low dose labetalol available for breakthrough pressure.      DVT Prophylaxis: Pneumatic Compression Devices  Code Status: DNR/DNI    Disposition: Expected discharge likely July 17 or 18    Time 45 minutes    Garry Cook MD  [phone] (P)  Text Page (7 am to 6 pm)    Interval History   Met with patient, daughter and RN.    While the patient's daughter had expressed wishes to pursue comfort care last night, she now notes that she would like to pursue a more mixed palliative/restorative approach in that she would like to optimize her mother's recovery without putting her through an aggressive workup. She is open to involving PT and OT, but not speech, noting that her mother wishes eat  for comfort and that that is a quality of life indicator for her.  She is open to IVF, as is her mother, but does not wish to have an aggressive workup.     -Data reviewed today: I reviewed all new labs and imaging results over the last 24 hours. I personally reviewed no images or EKG's today.    Physical Exam   Temp: 98  F (36.7  C) Temp src: Oral BP: 145/70 Pulse: 67 Heart Rate: 55 Resp: 16 SpO2: 96 % O2 Device: None (Room air)    Vitals:    07/13/17 1748   Weight: 58.9 kg (129 lb 12.8 oz)     Vital Signs with Ranges  Temp:  [98  F (36.7  C)] 98  F (36.7  C)  Pulse:  [67] 67  Heart Rate:  [55-82] 55  Resp:  [11-16] 16  BP: (132-176)/(62-87) 145/70  SpO2:  [95 %-98 %] 96 %  I/O last 3 completed shifts:  In: -   Out: 400 [Urine:400]    Constitutional: Pleasant, NAD  EYES, EOMI, nonicteric  HEENT:  Tongue midline, mild asymmetry of smile  Respiratory: Clear to auscultation bilaterally, no crackles  Cardiovascular: Regular rate and rhythm, S1, S2, no murmurs  GI: Abdomen soft, nontender, nondistended, bowel sounds are heard  Skin/Integumen: No jaundice, no suspicious rash  NEURO: mild smile asymmetry present. Speech is mildly dysarthric.     Medications     NaCl Stopped (07/14/17 0230)       ciprofloxacin  250 mg Oral Q12H JACOB     aspirin  325 mg Oral Daily     levothyroxine (SYNTHROID/LEVOTHROID) tablet 75 mcg  75 mcg Oral QAM       Data     Recent Labs  Lab 07/13/17  1750   WBC 6.9   HGB 11.2*   MCV 92      INR 1.00      POTASSIUM 4.2   CHLORIDE 110*   CO2 23   BUN 22   CR 0.79   ANIONGAP 7   SAMIR 8.9   GLC 95       Recent Results (from the past 24 hour(s))   CT Head w/o Contrast    Narrative    CT HEAD W/O CONTRAST   7/13/2017 6:01 PM     HISTORY: CODE STROKE, right-sided weakness.    TECHNIQUE: Axial images of the head without IV contrast material.  Radiation dose for this scan was reduced using automated exposure  control, adjustment of the mA and/or kV according to patient size, or  iterative  reconstruction technique.    COMPARISON: None.    FINDINGS:  There is generalized atrophy of the brain.  Areas of low  attenuation are present in the white matter of the cerebral  hemispheres that are consistent with small vessel ischemic disease in  this age patient. There are chronic areas of encephalomalacia in the  right frontal and right parietal regions. There is no evidence of  intracranial hemorrhage, mass, acute infarct or anomaly. The  visualized portions of the sinuses and mastoids appear normal. There  is no evidence of trauma.      Impression    IMPRESSION:   1. No acute pathology, no bleed, mass, or acute infarcts are seen.  2. Age related changes including diffuse brain atrophy.  White matter  changes consistent with small vessel ischemic disease.   3. Chronic areas of encephalomalacia in the right frontal and right  parietal regions. These are in the right middle cerebral artery  distribution.    CAROLE CHO MD   CT Head w Contrast    Narrative    CT BRAIN PERFUSION 7/13/2017 6:30 PM    HISTORY: Code Stroke    TECHNIQUE: Time sequential axial CT images of the head were acquired  during the administration of intravenous contrast 50 mL Isovue-370 .  CTA images of the [place] as well as color perfusion maps of  the brain were created from this time sequential axial source data.   Radiation dose for this scan was reduced using automated exposure  control, adjustment of the mA and/or kV according to patient size, or  iterative reconstruction technique.    COMPARISON: None.    FINDINGS: On the time to peak images, there is a subtle perfusion  defect in the left occipital region. The CBV images appear normal in  this region. The flow images also appear normal in this region.      Impression    IMPRESSION: Subtle perfusion defect in the left occipital region. This  would be consistent with ischemic brain injury. No definite core  infarct is identified.    CAROLE CHO MD   CTA Angiogram Head Neck     Narrative    CT ANGIOGRAM OF THE HEAD AND NECK  7/13/2017 7:21 PM     HISTORY: Right-sided weakness, code stroke    TECHNIQUE:  Precontrast localizing scans were followed by CT  angiography with an injection of 70 mL IV contrast with scans through  the head and neck.  Images were transferred to a separate 3-D  workstation where multiplanar reformations and 3-D images were  created.  Estimates of carotid stenoses are made relative to the  distal internal carotid artery diameters except as noted. Radiation  dose for this scan was reduced using automated exposure control,  adjustment of the mA and/or kV according to patient size, or iterative  reconstruction technique.    COMPARISON: None.    FINDINGS: Estimates of stenosis at the carotid bifurcations are  relative to the distal internal carotids.    Arch: Calcified atherosclerotic plaque is seen in the arch of the  aorta.    Neck:  Right Carotid:  The right common carotid artery is normal.  There is a  small amount of calcified plaque at the right carotid bifurcation. No  stenosis..  The right internal carotid artery is negative.     Left Carotid:  The left common carotid artery is unremarkable.   The  left carotid bifurcation appears normal.  The left internal carotid is  negative.      Vertebrals:  The vertebral arteries are normal.    Head:  Right Carotid:No occluded vessels are seen. .    Left Carotid:  No occluded vessels are identified. .    Basilar: Basilar artery appears normal. There appears to be occlusion  of the P1 segment of the left posterior cerebral artery. This is seen  on axial source images 493-495. Is also seen on coronal reformatted  image 28 of series 6 and 44 of series 8..     Miscellaneous: Venous sinuses appear patent.      Impression    IMPRESSION: There appears to be occlusion of the P1 segment of the  left posterior cerebral artery.    Findings discussed with Dr. Martha CHO MD

## 2017-07-15 NOTE — PLAN OF CARE
Problem: Goal Outcome Summary  Goal: Goal Outcome Summary  Outcome: Declining  Patient is alert to self only, alternates between sleep and aggression/impulsive behavior, refusing cares, refusing medications, refusing assessment, refusing to have pad checked for incontinence, VSS. Up with 1 and walker per night shift, refusing to get up with assistance today. Has not voided, refusing pad check and striking staff when they attempt to remove blanket so cannot bladder scan.  Regular Diet, refusing all oral intake after 0800. Denies Pain. Pagejose EID to request oral zyprexa changed to IM, will await return call.  Will continue to monitor.

## 2017-07-15 NOTE — PROVIDER NOTIFICATION
MD Notification    Notified Person:  MD    Notified Persons Name: Dr. Cook    Notification Date/Time: 7/15/2017 11:37 AM     Notification Interaction:  Talked with Physician    Purpose of Notification: patient very combative, does not follow commands, keeps trying to get out of bed. Can we have something less than Ativan but for aggression/confusion?    Orders Received:    Comments:

## 2017-07-15 NOTE — PLAN OF CARE
Problem: Goal Outcome Summary  Goal: Goal Outcome Summary  Outcome: No Change  Alert and oriented to self only. VSS on RA. Denies any pain/discomfort; no non-verbal s/sx of pain noted. Cooperative. Follows one step directions. Incontinent of bladder. Up with assist of 1, gait belt and walker.

## 2017-07-15 NOTE — PROGRESS NOTES
[facility]    Hospitalist Progress Note    Date of Service (when I saw the patient): 07/15/2017    Assessment & Plan   Enoc Taylor is a 89 year old female who was admitted on 7/13/2017    1. CVA: left posterior cerebral artery distribution.   Appreciate palliative care team input.     Patient with worsening agitaion today. Has been combative with staff. Discussed with Jessica Cho who is aware of this and unsderstands that we may need to use antipsychotics such as zydis or haldol if the patient starts to show signs of harm herself or others.     She verbalize an understanding of this. Zydis has been ordered. Will hold off on haldol pending clinical progress. Will also have bedside sitter assigned to patient.     2. Hypothyroidism: synthroid    3. ? UTI?  cipro ordered     4. HTN: holding lisinopril and coreg---favor permissive hypertension in immediate post CVA period.  Will make low dose labetalol available for breakthrough pressure.      DVT Prophylaxis: Pneumatic Compression Devices  Code Status: DNR/DNI    Disposition: Expected discharge likely July 17 or 18    Time 40 minutes    Garry Cook MD  [phone] (P)  Text Page (7 am to 6 pm)    Interval History   Seen with Lakeisha CERRATO    Patient increasing combative this AM. Has been swinging out at staff memebers.     Presently she is more calm, but noncomummicative. Encouraged patient to try taking in fluids and to work with RN regarding the CVA.     Also discussed care with jessica Cho by telephone, who is agreeable to the use of antispychotics if needed to help with her mother's agitation.     -Data reviewed today: I reviewed all new labs and imaging results over the last 24 hours. I personally reviewed no images or EKG's today.    Physical Exam   Temp: 97.5  F (36.4  C) Temp src: Axillary BP: 131/64 Pulse: 68 Heart Rate: 62 Resp: 18 SpO2: 99 % O2 Device: None (Room air)    Vitals:    07/13/17 1748   Weight: 58.9 kg (129 lb 12.8 oz)     Vital  Signs with Ranges  Temp:  [97.5  F (36.4  C)-98  F (36.7  C)] 97.5  F (36.4  C)  Pulse:  [68] 68  Heart Rate:  [62-91] 62  Resp:  [16-18] 18  BP: (121-138)/(50-70) 131/64  SpO2:  [98 %-99 %] 99 %  I/O last 3 completed shifts:  In: 360 [P.O.:360]  Out: 650 [Urine:650]    Constitutional: Avoids eye contact. Appears frustrated.   EYES, EOMI, nonicteric  HEENT:  Tongue midline, mild asymmetry of smile  Respiratory: Clear to auscultation bilaterally, no crackles  Cardiovascular: Regular rate and rhythm, S1, S2, no murmurs  GI: Abdomen soft, nontender, nondistended, bowel sounds are present  Skin/Integumen: No jaundice, no suspicious rash  NEURO:  Avoids eye contact. Moves all limbs. Facial drop present on right. Presently non-verbal.     Medications     NaCl Stopped (07/14/17 0230)       OLANZapine zydis  5 mg Oral Once     ciprofloxacin  250 mg Oral Q12H JACOB     aspirin  325 mg Oral Daily     levothyroxine (SYNTHROID/LEVOTHROID) tablet 75 mcg  75 mcg Oral QAM       Data     Recent Labs  Lab 07/13/17  1750   WBC 6.9   HGB 11.2*   MCV 92      INR 1.00      POTASSIUM 4.2   CHLORIDE 110*   CO2 23   BUN 22   CR 0.79   ANIONGAP 7   SAMIR 8.9   GLC 95       No results found for this or any previous visit (from the past 24 hour(s)).

## 2017-07-15 NOTE — PLAN OF CARE
Problem: Goal Outcome Summary  Goal: Goal Outcome Summary  Outcome: No Change  Oriented to self only. Pt combative, confused, refusing cares. Pt moved into room 6619 2. Sitter in room. Given IM Zyprexa 5mg around 1530. Pt has calmed down, still resistant to cares. Pt did allow VS to be taken this allison, did not allow writer to complete full assessment. VSS. Assist 1-2 walker per night RN, but refusing to get out of bed today. Incontinent at times. Reg diet, did not eat dinner. Pallative following.

## 2017-07-15 NOTE — PLAN OF CARE
Problem: Goal Outcome Summary  Goal: Goal Outcome Summary  Outcome: No Change  Pt is A&Ox1, self only. VSS on RA. Denies pain. Uncooperative most of shift. Pt agitation has increased throughout shift. Gotten OOB multiple times. Found once searching through garbage. Would push away staff's hands when trying to help. Resting calmly now. Will continue to monitor.

## 2017-07-16 NOTE — PLAN OF CARE
Problem: Goal Outcome Summary  Goal: Goal Outcome Summary  Outcome: No Change  Oriented to self only. Responds to voice command. Confused. Refused vital signs, and all assessments. Resistive with cares: pushes staff away and pulls blanket around herself. Appears comfortable; no s/sx pain noted. No attempt to get out of bed. Sitter at bedside.

## 2017-07-16 NOTE — PROGRESS NOTES
[facility]    Hospitalist Progress Note    Date of Service (when I saw the patient): 07/16/2017    Assessment & Plan   Enoc Taylor is a 89 year old female who was admitted on 7/13/2017    1. CVA: left posterior cerebral artery distribution.   Appreciate palliative care team input.     Patient with worsening agitaion today.     Exhibits paranoia. Apparently attempted to hit RN earlier.    Would continue xydis at present dose. May need titration if agitation escalates.     2. Hypothyroidism: synthroid    3. ? UTI?  cipro ordered --patient refusing.     4. HTN: holding lisinopril and coreg---favor permissive hypertension in immediate post CVA period.  Will make low dose labetalol available for breakthrough pressure.      DVT Prophylaxis: Pneumatic Compression Devices  Code Status: DNR/DNI    Disposition: Expected discharge likely 7/18 or 19 pending improvement in agitation and safe discharge plan./       Garry Cook MD  [phone] (P)  Text Page (7 am to 6 pm)    Interval History   Seen with BLOSSOM Beauchamp    Is perseverating on 'Dusty who has the keys'     Continually asks when she will see Dusty.     Denies thirst. Reminded her that she had a stroke.  Encouraged her to drink     -Data reviewed today: I reviewed all new labs and imaging results over the last 24 hours. I personally reviewed no images or EKG's today.    Physical Exam   Temp: 96  F (35.6  C) Temp src: Oral BP: 132/57 Pulse: 65 Heart Rate: 62 Resp: 18 SpO2: 92 % O2 Device: None (Room air)    Vitals:    07/13/17 1748   Weight: 58.9 kg (129 lb 12.8 oz)     Vital Signs with Ranges  Temp:  [96  F (35.6  C)-98.6  F (37  C)] 96  F (35.6  C)  Pulse:  [65] 65  Heart Rate:  [62-65] 62  Resp:  [18] 18  BP: (132-140)/(57-74) 132/57  SpO2:  [91 %-98 %] 92 %       Constitutional: Talkative   EYES, EOMI, nonicteric  HEENT:  Tongue midline, mild asymmetry of smile  Respiratory: Clear to auscultation bilaterally, no crackles  Cardiovascular: Regular rate  "and rhythm, S1, S2, no murmurs  GI: Abdomen soft, nontender, nondistended, bowel sounds are present  Skin/Integumen: No jaundice, no suspicious rash  NEURO:  Right facial droop.   PSYCH: persererates about \"Dusty who has the keys\" speaks in accusatory tones. Appears to be hallucinating.     Medications     NaCl Stopped (07/14/17 0230)       OLANZapine zydis  5 mg Oral Once     ciprofloxacin  250 mg Oral Q12H JACOB     aspirin  325 mg Oral Daily     levothyroxine (SYNTHROID/LEVOTHROID) tablet 75 mcg  75 mcg Oral QAM       Data     Recent Labs  Lab 07/13/17  1750   WBC 6.9   HGB 11.2*   MCV 92      INR 1.00      POTASSIUM 4.2   CHLORIDE 110*   CO2 23   BUN 22   CR 0.79   ANIONGAP 7   SAMIR 8.9   GLC 95       No results found for this or any previous visit (from the past 24 hour(s)).    "

## 2017-07-17 NOTE — PLAN OF CARE
Problem: Goal Outcome Summary  Goal: Goal Outcome Summary  Outcome: No Change  Pt is A/O to self with confusion.  Up with A2.  Reg diet, but very little PO taken.  Declined assessment and refusing cares.  Restless and agitated at times, difficult to redirect.  PRN zyprexa given x1, which was effective.  Sitter at bedside.  No IV access.  No incontinence this shift.  Continue to monitor.

## 2017-07-17 NOTE — PROGRESS NOTES
Care Transition Initial Assessment -   Reason For Consult: discharge planning  Met with: Marquis Lopez   Active Problems:    Stroke (cerebrum) (H)         DATA  Lives With: facility resident  Living Arrangements: assisted living  Description of Support System: Supportive, Involved     Identified issues/concerns regarding health management: Bedside RN reported jessica Cho asked about hospice enrollment at patient's assisted living facility, which is [facility]. Called [facility] ([phone], fax [phone]) and spoke with RN, Lynnette (cell  [phone]) who did a nursing assessment to determine if patient's care needs can be met there with hospice. Lynnette agreed that patient can return. She noted they typically use [facility]. Discussed with Marquis lopez and offered choice of hospice agencies. As Marquis wants a hospital bed in place before patient returns she chose [facility] for coordination of the discharge needs, including equipment. Spoke with Deanna at [facility] and an appointment was set for this afternoon at 1530 with Amarjit. The plan will then be for patient to discharge on Tuesday, via stretcher, after the hospital bed has arrived. Updated Lynnette at [facility] , who notes the RN there Tuesday will be Lety at [phone]. She asks that orders be faxed in advance. Will call Lety with the discharge time on Tuesday.     Quality Of Family Relationships: supportive, involved       ASSESSMENT  Cognitive Status: Patient is confused. Marquis is making decisions on patient's behalf. Marquis noted she is confident she is following patient's wishes with this decision, as written in patient's health care directive.  Concerns to be addressed: D/C planning, including hospice     PLAN  Financial costs for the patient includes: Did not discuss cost of continuing care at the [facility].  Patient/family given options and choices for discharge: Yes  Patient/family is agreeable to the plan? Yes  Patient/family Goals and  Preferences: Back to [facility] with [facility]

## 2017-07-17 NOTE — PROGRESS NOTES
[facility]    Hospitalist Progress Note    Date of Service (when I saw the patient): 07/17/2017    Assessment & Plan   Enoc Taylor is a 89 year old female who was admitted on 7/13/2017    1. CVA: left posterior cerebral artery distribution.   ---Palliative care consulted and following   -- Hospice conslut    2. Hypothyroidism: synthroid    3. UTI?  cipro ordered --patient refusing.     4. HTN: holding lisinopril and coreg  ---permissive htn    5. Agitation: on prn Zyprexa  -- Will try scheduled with prn Zyprexa and monitor response    DVT Prophylaxis: Pneumatic Compression Devices    Code Status: DNR/DNI    Disposition: Likely tomorrow if agitation well controlled    Abiola Chavez MD  Internal medicine Hospitalist:   Pager [phone]    7/17/2017    Interval History   Patient seen and assessed. Trying to get out of the bed.       -Data reviewed today: I reviewed all new labs and imaging results over the last 24 hours. I personally reviewed no images or EKG's today.    Physical Exam   Temp: 97.8  F (36.6  C) Temp src: Axillary BP: 146/60 Pulse: 65 Heart Rate: 71 Resp: 18 SpO2: 96 % O2 Device: None (Room air)    Vitals:    07/13/17 1748   Weight: 58.9 kg (129 lb 12.8 oz)     Vital Signs with Ranges  Temp:  [96  F (35.6  C)-97.8  F (36.6  C)] 97.8  F (36.6  C)  Pulse:  [65] 65  Heart Rate:  [70-81] 71  Resp:  [18] 18  BP: (132-146)/(49-60) 146/60  SpO2:  [92 %-96 %] 96 %  I/O last 3 completed shifts:  In: 120 [P.O.:120]  Out: -     Constitutional: Not in distress  EYES, EOMI, nonicteric  Respiratory: Clear to auscultation bilaterally, no crackles  Cardiovascular: Regular rate and rhythm, S1, S2, no murmurs  GI: Abdomen soft, nontender, nondistended, bowel sounds are present  Skin/Integumen: No jaundice, no suspicious rash  NEURO:  Right facial droop.   .     Medications     NaCl Stopped (07/14/17 0230)       OLANZapine zydis  5 mg Oral Once     ciprofloxacin  250 mg Oral Q12H JACOB     aspirin  325  mg Oral Daily     levothyroxine (SYNTHROID/LEVOTHROID) tablet 75 mcg  75 mcg Oral QAM       Data     Recent Labs  Lab 07/13/17  1750   WBC 6.9   HGB 11.2*   MCV 92      INR 1.00      POTASSIUM 4.2   CHLORIDE 110*   CO2 23   BUN 22   CR 0.79   ANIONGAP 7   SAMIR 8.9   GLC 95       No results found for this or any previous visit (from the past 24 hour(s)).

## 2017-07-17 NOTE — PROGRESS NOTES
Hospice SW met with ptdauyanannemarie Cho and granddaughter Dhara. Pt is on a comfort care plan and will be discharging to [facility] tomorrow. Writer reviewed with pt family the hospice philosophy of care and hospice program. Marquis and Dhara verbalized understanding. Provided them with written information about hospice and a contact number. Writer ordered bed with rails and otb table for delivery tomorrow before 11am. Coordinated with BLOSSOM Mohan at [facility] via speaker phone with family present. She requested bed not come this evening. Coordinated with Unit CARMELO Arreola prior to meeting with family. Plan is for pt to discharge from [facility] tomorrow via medical transport arranged by unit CARMELO and  Hospice will meet with Marquis at [facility] at 1pm to complete consents and election.     Psychosocial Information/ Pt has always been very much in favor of hospice and no aggressive interventions at EOL according to family. She has two adult children, Marquis lives locally and makes medical decisions. A son is a neuropsychologist and lives in [facility]. She had been living at [facility] in the [place] but after discussing care needs today, Marquis has requested p transfer to memory care instead.

## 2017-07-17 NOTE — PLAN OF CARE
Problem: Goal Outcome Summary  Goal: Goal Outcome Summary  Outcome: No Change  Pt. Alert x1, refused all cares & meds. Calm, illogical speech. Up x1 to the bathroom; incontinent. Daughter visited and has decided to transition to comfort cares. Daughter requests we hold all meals and meds. Planned hospice meeting today d/c back to [facility] tomorrow.

## 2017-07-17 NOTE — PLAN OF CARE
Problem: Goal Outcome Summary  Goal: Goal Outcome Summary  Outcome: No Change  Care Plan Summary Note: A and O x1 only, very confused, agitated at times, restless.  Pt. Stating the cookies are baking and points at a towel.  MD notified of increased agitation, Zyprexa dosing increased from twice daily PRN to every 6 hrs PRN.  Bedside attendant remains in place for safety.   Activity Level: Up with one, walker, refusing gait belt.   Fall Risk:Yes  Tests/Procedures for next shift:  Anticipated DC date:? Back to assisted living ?

## 2017-07-17 NOTE — PROVIDER NOTIFICATION
MD Notification    Notified Person:  MD    Notified Persons Name: Dr. Chisholm  Notification Date/Time:7-16-17    Notification Interaction:  Talked with Physician    Purpose of Notification:increasing agitation, unable to redirect pt    Orders Received: Zyprexa every 6 hrs PRN    Comments:

## 2017-07-17 NOTE — PLAN OF CARE
Problem: Goal Outcome Summary  Goal: Goal Outcome Summary  Outcome: No Change  Pt confused; oriented to self only. Uncooperative with meds, nursing assessment, and cares. Pt also refuses to eat/drink (only had 1/2 cup of ice cream today). Slept most of morning/early afternoon. However, this afternoon, pt appeared to be paranoid, restless, climbing out of bed/ taking off gown. Pt redirected/emotional support given, with some response. However, shortly after,pt became restless/agitated again. PRN PO Zyprexa given/ effective. Currently resting quietly. Sitter at bedside. Will continue to monitor.

## 2017-07-17 NOTE — PROGRESS NOTES
Hospitalist X-cover    Called about aggitation.  Will inc zyprexa to q 6 hours prn.    YEE Chisholm MD

## 2017-07-18 NOTE — PLAN OF CARE
Problem: Goal Outcome Summary  Goal: Goal Outcome Summary  Outcome: No Change  Pt on comfort cares, slept all night, EMERSON A&O due to that. Repo q2h and incontinence care as needed; slept through repo but combative with perineal cares. VSS on RA. Assist of 2. Dc to memory care today.

## 2017-07-18 NOTE — PROGRESS NOTES
CARMELO  D) Patient is set to discharge today back to [facility] today with hospice.  I) Received message from Kaci with [facility] noting the hospital bed will be delivered by 1100 today. Hospice enrollment is scheduled at 1300. [facility] transport at 1200. Stretcher is needed due to patient's confusion and restlessness, The PCS form was completed and faxed.  Updated RNLety ([phone]) at [facility] and noted daughter Marquis prefers placement in the memory care area. Lety will discuss with their charge RN and follow up with Marquis about this move. Paged MD for orders and called the discharge pharmacy regarding billing the meds to  Hospice. Will fax orders to [facility] when completed ( f. [phone])  Updated Kit on the transport time.  A) Kit agrees to this plan.  P) D/C to [facility] with hospice enrollment today.

## 2017-07-18 NOTE — DISCHARGE SUMMARY
[facility]  Discharge Summary        Enoc Taylor MRN# [id number]   YOB: 1924 Age: 89 year old     Date of Admission:  7/13/2017  Date of Discharge:  7/18/2017  Admitting Physician:  Chrissie Chisholm MD  Discharge Physician: Precious Chavez MD  Discharging Service: Hospitalist     Primary Provider: Juan C Kent  Primary Care Physician Phone Number: [phone]         Discharge Diagnoses:         Acute embolic stroke (H)  Hospice care patient  Delirium        Problem Oriented Hospital Course (Providers):    Enoc Taylor was admitted on 7/13/2017 by Chrissie Chisholm MD and I would refer you to their history and physical.  The following problems were addressed during her hospitalization:  Enoc Taylor is a 89 year old female who was admitted on 7/13/2017     1. CVA: left posterior cerebral artery distribution.   ---Palliative care consulted and followed  -- Hospice for comfort care     2. Hypothyroidism: synthroid     3. UTI?  placed on cipro     4. HTN: on lisinopril and coreg was on-hold for permissive htn     5. Agitation: on prn Zyprexa  --  scheduled with prn Zyprexa     DVT Prophylaxis: Pneumatic Compression Devices     Code Status: DNR/DNI    Patient was on comfort care and stable. Will discharge with hospice care          Code Status:      Comfort Care         Important Results:      See below.         Pending Results:        Unresulted Labs Ordered in the Past 30 Days of this Admission     No orders found from 5/14/2017 to 7/14/2017.               Discharge Instructions and Follow-Up:               Discharge Disposition:      Admited to hospice care.    Discharged to home         Discharge Medications:        Current Discharge Medication List      START taking these medications    Details   MEDICATION INSTRUCTION If care facility cannot accept or use ranges, facility is instructed to use lower end of dosing range    Associated Diagnoses: Hospice care  patient      morphine sulfate HIGH CONCENTRATE (ROXANOL *CONCENTRATED*) 100 MG/5ML (HIGH CONC) solution Take 0.125 mLs (2.5 mg) by mouth or place under tongue every 2 hours as needed for moderate to severe pain (and/or shortness of breath.)  Qty: 30 mL, Refills: 0    Associated Diagnoses: Hospice care patient      atropine 1 % ophthalmic solution Take 2 drops by mouth, place under tongue or place inside cheek every 2 hours as needed for other (terminal respiratory secretions) Not for ophthalmic use.  Qty: 5 mL, Refills: 1    Associated Diagnoses: Hospice care patient      glycopyrrolate (ROBINUL) 1 MG tablet Take 2 tablets (2 mg) by mouth every 4 hours as needed (secretions) May also be give via G-tube or J-tube, if needed.  Qty: 30 tablet, Refills: 1    Associated Diagnoses: Hospice care patient      LORazepam (ATIVAN) 2 MG/ML (HIGH CONC) solution Take 0.25 mLs (0.5 mg) by mouth, place under tongue or insert rectally every 4 hours as needed for anxiety (restlessness)  Qty: 30 mL, Refills: 1    Associated Diagnoses: Hospice care patient      bisacodyl (DULCOLAX) 10 MG Suppository Unwrap and insert 1 suppository rectally twice daily as needed for constipation.  Qty: 12 suppository, Refills: 1    Associated Diagnoses: Hospice care patient      !! OLANZapine zydis (ZYPREXA) 5 MG ODT tab Take 1 tablet (5 mg) by mouth 2 times daily  Qty: 30 tablet, Refills: 0    Associated Diagnoses: Hospice care patient      !! OLANZapine zydis (ZYPREXA) 5 MG ODT tab Take 1 tablet (5 mg) by mouth every 8 hours as needed for agitation  Qty: 30 tablet, Refills: 0    Associated Diagnoses: Hospice care patient       !! - Potential duplicate medications found. Please discuss with provider.      CONTINUE these medications which have NOT CHANGED    Details   senna-docusate (SENOKOT-S;PERICOLACE) 8.6-50 MG per tablet Take 1 tablet by mouth daily as needed for constipation      ACETAMINOPHEN PO Take 1,000 mg by mouth 3 times daily      ASPIRIN  PO Take 81 mg by mouth daily      LEVOTHYROXINE SODIUM PO Take 75 mcg by mouth every morning      atorvastatin (LIPITOR) 10 MG tablet Take 1 tablet (10 mg) by mouth daily  Qty: 30 tablet    Associated Diagnoses: NSTEMI (non-ST elevated myocardial infarction) (H)      lisinopril (PRINIVIL/ZESTRIL) 5 MG tablet Take 1 tablet (5 mg) by mouth daily  Qty: 30 tablet    Associated Diagnoses: NSTEMI (non-ST elevated myocardial infarction) (H)      carvedilol (COREG) 3.125 MG tablet Take 1 tablet (3.125 mg) by mouth 2 times daily (with meals)  Qty: 60 tablet    Associated Diagnoses: NSTEMI (non-ST elevated myocardial infarction) (H)      Cyanocobalamin (B-12) 1000 MCG TBCR Take 1,000 mcg by mouth daily  Qty: 100 tablet, Refills: 1    Associated Diagnoses: Vitamin B 12 deficiency         STOP taking these medications       CIPROFLOXACIN PO Comments:   Reason for Stopping:                    Allergies:       No Known Allergies         Consultations This Hospital Stay:      Consultation during this admission received from palliative medicine         Condition and Physical Exam on Discharge:      Discharge condition: Stable   Discharge vitals: Heart Rate: 79, Blood pressure 121/54, pulse 69, temperature 97.8  F (36.6  C), temperature source Axillary, resp. rate 16, weight 58.9 kg (129 lb 12.8 oz), SpO2 97 %, not currently breastfeeding.       Constitutional:  No apparent distress   Lungs: Clear to auscultation bilaterally, no crackles or wheezing   Cardiovascular: Regular HR, normal S1 and S2, and no murmur noted   Abdomen: Normal bowel sounds, soft, non-distended, non-tender   Skin: No rashes, no cyanosis.   Other:                 Discharge Orders for Skilled Facility (from Discharge Orders):               Rehab orders for Skilled Facility (from Discharge Orders):               Discharge Time:      Greater than 30 minutes.        Image Results From This Hospital Stay (For Non-EPIC Providers):        Results for orders placed or  performed during the hospital encounter of 07/13/17   CTA Angiogram Head Neck    Narrative    CT ANGIOGRAM OF THE HEAD AND NECK  7/13/2017 7:21 PM     HISTORY: Right-sided weakness, code stroke    TECHNIQUE:  Precontrast localizing scans were followed by CT  angiography with an injection of 70 mL IV contrast with scans through  the head and neck.  Images were transferred to a separate 3-D  workstation where multiplanar reformations and 3-D images were  created.  Estimates of carotid stenoses are made relative to the  distal internal carotid artery diameters except as noted. Radiation  dose for this scan was reduced using automated exposure control,  adjustment of the mA and/or kV according to patient size, or iterative  reconstruction technique.    COMPARISON: None.    FINDINGS: Estimates of stenosis at the carotid bifurcations are  relative to the distal internal carotids.    Arch: Calcified atherosclerotic plaque is seen in the arch of the  aorta.    Neck:  Right Carotid:  The right common carotid artery is normal.  There is a  small amount of calcified plaque at the right carotid bifurcation. No  stenosis..  The right internal carotid artery is negative.     Left Carotid:  The left common carotid artery is unremarkable.   The  left carotid bifurcation appears normal.  The left internal carotid is  negative.      Vertebrals:  The vertebral arteries are normal.    Head:  Right Carotid:No occluded vessels are seen. .    Left Carotid:  No occluded vessels are identified. .    Basilar: Basilar artery appears normal. There appears to be occlusion  of the P1 segment of the left posterior cerebral artery. This is seen  on axial source images 493-495. Is also seen on coronal reformatted  image 28 of series 6 and 44 of series 8..     Miscellaneous: Venous sinuses appear patent.      Impression    IMPRESSION: There appears to be occlusion of the P1 segment of the  left posterior cerebral artery.    Findings discussed with  Dr. Martha CHO MD   CT Head w Contrast    Narrative    CT BRAIN PERFUSION 7/13/2017 6:30 PM    HISTORY: Code Stroke    TECHNIQUE: Time sequential axial CT images of the head were acquired  during the administration of intravenous contrast 50 mL [id number]-370 .  CTA images of the [place] as well as color perfusion maps of  the brain were created from this time sequential axial source data.   Radiation dose for this scan was reduced using automated exposure  control, adjustment of the mA and/or kV according to patient size, or  iterative reconstruction technique.    COMPARISON: None.    FINDINGS: On the time to peak images, there is a subtle perfusion  defect in the left occipital region. The CBV images appear normal in  this region. The flow images also appear normal in this region.      Impression    IMPRESSION: Subtle perfusion defect in the left occipital region. This  would be consistent with ischemic brain injury. No definite core  infarct is identified.    CAROLE CHO MD   CT Head w/o Contrast    Narrative    CT HEAD W/O CONTRAST   7/13/2017 6:01 PM     HISTORY: CODE STROKE, right-sided weakness.    TECHNIQUE: Axial images of the head without IV contrast material.  Radiation dose for this scan was reduced using automated exposure  control, adjustment of the mA and/or kV according to patient size, or  iterative reconstruction technique.    COMPARISON: None.    FINDINGS:  There is generalized atrophy of the brain.  Areas of low  attenuation are present in the white matter of the cerebral  hemispheres that are consistent with small vessel ischemic disease in  this age patient. There are chronic areas of encephalomalacia in the  right frontal and right parietal regions. There is no evidence of  intracranial hemorrhage, mass, acute infarct or anomaly. The  visualized portions of the sinuses and mastoids appear normal. There  is no evidence of trauma.      Impression    IMPRESSION:   1. No acute  pathology, no bleed, mass, or acute infarcts are seen.  2. Age related changes including diffuse brain atrophy.  White matter  changes consistent with small vessel ischemic disease.   3. Chronic areas of encephalomalacia in the right frontal and right  parietal regions. These are in the right middle cerebral artery  distribution.    CAROLE CHO MD           Most Recent Lab Results In EPIC (For Non-EPIC Providers):    Most Recent 3 CBC's:  Recent Labs   Lab Test  07/13/17   1750  01/23/17   1406  05/02/16   1118   WBC  6.9  7.9  6.0   HGB  11.2*  12.0  13.7   MCV  92  87  89   PLT  213  310  317      Most Recent 3 BMP's:  Recent Labs   Lab Test  07/13/17   1750 02/03/17 01/31/17   NA  140  137  141   POTASSIUM  4.2  4.3  5.1*   CHLORIDE  110*  104  110*   CO2  23  23  24   BUN  22  18  23   CR  0.79  0.73  0.74   ANIONGAP  7  10  7   SAMIR  8.9  9.3  8.9   GLC  95  105  80     Most Recent 3 Troponin's:  Recent Labs   Lab Test  01/24/17   0805  01/23/17   2155  01/23/17   1755   TROPI  0.190*  0.340*  0.308*     Most Recent 3 INR's:  Recent Labs   Lab Test  07/13/17   1750   INR  1.00     Most Recent 2 LFT's:  Recent Labs   Lab Test  01/23/17   1406  05/02/16   1118   AST  18  21   ALT  15  18   ALKPHOS  93  94   BILITOTAL  0.4  0.3     Most Recent Cholesterol Panel:  Recent Labs   Lab Test  01/25/17   0754   CHOL  174   LDL  115*   HDL  44*   TRIG  75     Most Recent 6 Bacteria Isolates From Any Culture (See EPIC Reports for Culture Details):  Recent Labs   Lab Test  05/02/16   1154  12/18/14   1617   CULT  >100,000 colonies/mL mixed urogenital natacha  Susceptibility testing not routinely done    10,000 to 50,000 colonies/mL mixed urogenital natacha     Most Recent TSH, T4 and HgbA1c:  Recent Labs   Lab Test  01/23/17   1406   TSH  <0.01*   T4  1.52*

## 2017-07-18 NOTE — PLAN OF CARE
Problem: Goal Outcome Summary  Goal: Goal Outcome Summary  Outcome: No Change  Pt. Alert, oriented to self only. Restless at pm, difficult to redirect, difficulty following commands, illogical speech and visual hallucination. Prn [id number], with fair results. No IV site. Up x2 to the bathroom; incontinent. Daughter was here for hospice consult. Daughter requests we hold all meals and meds. Pt did request dinner, and given ate 25% only. Will be discharging to [facility] possibly tomorrow.

## 2017-07-18 NOTE — PLAN OF CARE
Problem: Goal Outcome Summary  Goal: Goal Outcome Summary  Outcome: Adequate for Discharge Date Met:  07/18/17  No IV's in place, confused, combative. Transported in stretcher by [facility]. Discharge medications given to [facility], Belongings clothing shoes and glasses given to [facility]. PRN Zyprexa given for agitation.

## 2017-07-18 NOTE — DISCHARGE INSTRUCTIONS
Discharge back to [facility] assisted living, phone [phone]  [facility] phone, [phone]

## 2017-07-19 NOTE — TELEPHONE ENCOUNTER
Our goal is to have forms completed within 72 hours, however some forms may require a visit or additional information.    What clinic location was the form placed at [facility] or [facility]     Who is the form from? Sandra [facility]  Where did the form come from? Faxed to clinic   The form was placed in the inbox of Jaun C Cage MD      Please fax to [phone]    Additional comments: 7/18/2017 - 7/17/2018    Call take on 7/19/2017 at 11:45 AM by Anita Mota